# Patient Record
Sex: FEMALE | Race: WHITE | NOT HISPANIC OR LATINO | Employment: OTHER | URBAN - METROPOLITAN AREA
[De-identification: names, ages, dates, MRNs, and addresses within clinical notes are randomized per-mention and may not be internally consistent; named-entity substitution may affect disease eponyms.]

---

## 2018-01-29 LAB
INFLUENZA A ANTIGEN, POC: NEGATIVE
INFLUENZA B ANTIGEN, POC: NEGATIVE
RAPID GROUP A STREP (OHS): NEGATIVE

## 2020-12-02 ENCOUNTER — HISTORICAL (OUTPATIENT)
Dept: ADMINISTRATIVE | Facility: HOSPITAL | Age: 65
End: 2020-12-02

## 2020-12-02 LAB
ALBUMIN SERPL-MCNC: 4.2 GM/DL (ref 3.4–4.8)
ALBUMIN/GLOB SERPL: 1.2 RATIO (ref 1.1–2)
ALP SERPL-CCNC: 79 UNIT/L (ref 40–150)
ALT SERPL-CCNC: 16 UNIT/L (ref 0–55)
APPEARANCE, UA: CLEAR
AST SERPL-CCNC: 18 UNIT/L (ref 5–34)
BACTERIA SPEC CULT: ABNORMAL /HPF
BILIRUB SERPL-MCNC: 0.6 MG/DL
BILIRUB UR QL STRIP: NEGATIVE
BILIRUBIN DIRECT+TOT PNL SERPL-MCNC: 0.3 MG/DL (ref 0–0.5)
BILIRUBIN DIRECT+TOT PNL SERPL-MCNC: 0.3 MG/DL (ref 0–0.8)
BUN SERPL-MCNC: 12.5 MG/DL (ref 9.8–20.1)
CALCIUM SERPL-MCNC: 9.7 MG/DL (ref 8.4–10.2)
CHLORIDE SERPL-SCNC: 98 MMOL/L (ref 98–107)
CHOLEST SERPL-MCNC: 212 MG/DL
CHOLEST/HDLC SERPL: 4 {RATIO} (ref 0–5)
CO2 SERPL-SCNC: 28 MMOL/L (ref 23–31)
COLOR UR: YELLOW
CREAT SERPL-MCNC: 0.79 MG/DL (ref 0.55–1.02)
CREAT UR-MCNC: 111.9 MG/DL (ref 45–106)
EST. AVERAGE GLUCOSE BLD GHB EST-MCNC: >355.1 MG/DL
GLOBULIN SER-MCNC: 3.5 GM/DL (ref 2.4–3.5)
GLUCOSE (UA): NEGATIVE
GLUCOSE SERPL-MCNC: 104 MG/DL (ref 82–115)
HBA1C MFR BLD: >14 %
HDLC SERPL-MCNC: 56 MG/DL (ref 35–60)
HGB UR QL STRIP: NEGATIVE
KETONES UR QL STRIP: NEGATIVE
LDLC SERPL CALC-MCNC: 129 MG/DL (ref 50–140)
LEUKOCYTE ESTERASE UR QL STRIP: ABNORMAL
MICROALBUMIN UR-MCNC: 12 UG/ML
MICROALBUMIN/CREAT RATIO PNL UR: 10.7 MG/GM CR (ref 0–30)
NITRITE UR QL STRIP: NEGATIVE
PH UR STRIP: 7.5 [PH] (ref 5–9)
POTASSIUM SERPL-SCNC: 4.2 MMOL/L (ref 3.5–5.1)
PROT SERPL-MCNC: 7.7 GM/DL (ref 5.8–7.6)
PROT UR QL STRIP: NEGATIVE
RBC #/AREA URNS HPF: ABNORMAL /[HPF]
SODIUM SERPL-SCNC: 140 MMOL/L (ref 136–145)
SP GR UR STRIP: 1.02 (ref 1–1.03)
SQUAMOUS EPITHELIAL, UA: ABNORMAL
TRIGL SERPL-MCNC: 137 MG/DL (ref 37–140)
TSH SERPL-ACNC: 1.64 UIU/ML (ref 0.35–4.94)
UROBILINOGEN UR STRIP-ACNC: 0.2
VLDLC SERPL CALC-MCNC: 27 MG/DL
WBC #/AREA URNS HPF: ABNORMAL /[HPF]

## 2020-12-08 ENCOUNTER — HISTORICAL (OUTPATIENT)
Dept: ADMINISTRATIVE | Facility: HOSPITAL | Age: 65
End: 2020-12-08

## 2020-12-08 LAB
EST. AVERAGE GLUCOSE BLD GHB EST-MCNC: 137 MG/DL
HBA1C MFR BLD: 6.4 %

## 2021-01-04 ENCOUNTER — HISTORICAL (OUTPATIENT)
Dept: ADMINISTRATIVE | Facility: HOSPITAL | Age: 66
End: 2021-01-04

## 2021-01-14 ENCOUNTER — HISTORICAL (OUTPATIENT)
Dept: ADMINISTRATIVE | Facility: HOSPITAL | Age: 66
End: 2021-01-14

## 2021-03-03 ENCOUNTER — HISTORICAL (OUTPATIENT)
Dept: ADMINISTRATIVE | Facility: HOSPITAL | Age: 66
End: 2021-03-03

## 2021-04-29 LAB — CRC RECOMMENDATION EXT: NORMAL

## 2021-05-10 ENCOUNTER — HISTORICAL (OUTPATIENT)
Dept: ADMINISTRATIVE | Facility: HOSPITAL | Age: 66
End: 2021-05-10

## 2021-06-04 ENCOUNTER — HISTORICAL (OUTPATIENT)
Dept: ADMINISTRATIVE | Facility: HOSPITAL | Age: 66
End: 2021-06-04

## 2021-07-08 ENCOUNTER — HISTORICAL (OUTPATIENT)
Dept: ADMINISTRATIVE | Facility: HOSPITAL | Age: 66
End: 2021-07-08

## 2021-10-06 LAB
LEFT EYE DM RETINOPATHY: NEGATIVE
RIGHT EYE DM RETINOPATHY: NEGATIVE

## 2022-02-09 LAB
BCS RECOMMENDATION EXT: NORMAL
BMD RECOMMENDATION EXT: NORMAL

## 2022-05-02 NOTE — HISTORICAL [facility]ER
This is a historical note converted from [place]. Formatting and pictures may have been removed.  Please reference [place] for original formatting and attached multimedia. Chief Complaint  L total hip, global 2/2/21-5/3/21 pt states that her hip is perfectly fine. she is having trouble with her keft knee.  History of Present Illness  65-year-old female presents office today for 1 month follow-up on her left hip.? She is 1 month status post left total hip arthroplasty doing great. ?She is working with therapy and ambulating with a cane.  Review of Systems  Systemic: No fever, no chills, and no recent weight change.  Head: No headache - frequent.  Eyes: No vision problems.  Otolarnygeal: No hearing loss, no earache, no epistaxis, no hoarseness, and no tooth pain. Gums normal.  Cardiovascular: No chest pain or discomfort and no palpitations.  Pulmonary: No pulmonary symptoms - no dyspnea, no shortness of breath  Gastrointestinal: Appetite not decreased. No dysphagia and no constant eructation. No nausea, no vomiting, no abdominal pain, no hematochezia.  Genitourinary: No genitourinary symptoms - No urinary hesitancy. No urinary loss of control - no burning sensation during urination.  Musculoskeletal: No calf muscle cramps and no localized soft tissue swelling  Neurological: No fainting and no convulsions.  Psychological: no depression.  Skin: No rash.  Physical Exam  Vitals & Measurements  T:?97.7? ?F (Oral)? HR:?119(Peripheral)? BP:?135/79?  HT:?167.00?cm? WT:?107.100?kg? BMI:?38.4?  Musculoskeletal System:  left Hips:  General/bilateral: ? No swelling of the hips. ? No induration of the hips. ? No tenderness on palpation of the hips. ? No instability of the hips was noted.  left?Hip: ? Motion was normal.  Neurological:  Motor (Strength): ? Weakness of the?left hip was observed.  Skin:  ? No cellulitis.  Wound healing normal. Surgical incision C/D/I  Tests  Imaging:  X-Ray Of The Pelvis:  Pelvic x-ray was  performed.  X-Ray Hip:  Complete?left hip x-ray with two or more views of the AP and lateral aspects were performed.  Impressions Radiology Test  X-ray of hip was performed showed intact?left hip prosthesis.  Assessment/Plan  Status post total hip replacement, left?Z96.642  ?Full weightbearing left lower extremity continue use of a cane and physical therapy. ?Follow-up in 3 months for repeat evaluation.  Ordered:  Clinic Follow up, *Est. 06/03/21 3:00:00 CDT, Order for future visit, Status post total hip replacement, left, [facility]  Post-Op follow-up visit [id number] PC, Status post total hip replacement, left, [facility], 03/03/21 14:52:00 CST  PT/OT External Referral, 03/03/21 14:53:00 CST, Status post total hip replacement, left, Anit Grav Hip Abductor & Extensor Exc.  Aquatics for ROM & Strength  Isotonic hamstring & Closed Chain Quad  No Dry Needling  Therapeutic Excercise  Stretch and Strengthen, 3 X Week,...  XR Hip Left 2 Views, Routine, 03/03/21 14:41:00 CST, None, Stretcher, Patient Has IV?, Rad Type, Status post total hip replacement, left, Not Scheduled, 03/03/21 14:41:00 CST  ?  Referrals  Clinic Follow up, *Est. 06/03/21 3:00:00 CDT, Order for future visit, Status post total hip replacement, left, [facility]  PT/OT External Referral, 03/03/21 14:53:00 CST, Status post total hip replacement, left, Anit Grav Hip Abductor & Extensor Exc.  Aquatics for ROM & Strength  Isotonic hamstring & Closed Chain Quad  No Dry Needling  Therapeutic Excercise  Stretch and Strengthen, 3 X Week,...   Problem List/Past Medical History  Ongoing  Acetabular protrusion  Anxiety  Arthritis  BMI 36.0-36.9,adult  Diabetes mellitus  Hyperlipemia  Hypertension  Left hip pain  Need for influenza vaccination  Need for pneumococcal vaccine  Primary osteoarthritis of left hip  Primary osteoarthritis of left knee  Reflux  Well adult exam  Historical  Bruises easily  chronic sinus infections  left knee  pain  Uterine fibroids  Wears glasses  Procedure/Surgical History  [facility] Total Hip Arthroplasty (Left) (02/02/2021)  Replacement of Left Hip Joint with Synthetic Substitute, Uncemented, Open Approach (02/02/2021)  Arthroscopy, knee, surgical; with meniscectomy (medial AND lateral, including any meniscal shaving) including debridement/shaving of articular cartilage (chondroplasty), same or separate compartment(s), when performed. (11/15/2012)  Excision of semilunar cartilage of knee (11/15/2012)  Cholecystectomy  Hysterectomy  Tonsillectomy   Medications  ALPRAZolam 0.25 mg oral tablet, disintegrating, 0.25 mg= 1 tab(s), Oral, BID, PRN,? ?Not taking: PRN  aspirin 81 mg oral Delayed Release (EC) tablet, 81 mg= 1 tab(s), Oral, BID  escitalopram 20 mg oral tablet, See Instructions  Glucometer, See Instructions  hydrochlorothiazide-lisinopril 12.5 mg-20 mg oral tablet, 1 tab(s), Oral, Daily, 3 refills  melatonin 10 mg oral tablet, 10 mg= 1 tab(s), Oral, Once a day (at bedtime), PRN  metFORMIN 500 mg oral tablet, extended release, 1000 mg= 2 tab(s), Oral, Daily, 3 refills  MVI with Minerals (Adult Tab), 1 tab(s), Oral, Daily  potassium acid phosphate, 1 tab = 1000mg, Oral, Daily  Pravastatin 20 mg Oral Tab, 20 mg= 1 tab(s), Oral, Daily, 3 refills  Vitamin D3, 1 tab, Oral, Daily  Allergies  No Known Allergies  Social History  Abuse/Neglect  No, No, Yes, 03/03/2021  No, 02/02/2021  No, 02/02/2021  Alcohol  Current, Wine, Liquor, 1-2 times per month, 11/14/2012  Employment/School  Retired, Work/School description: Retired from [employer]. Highest education level: High school., 12/02/2020  Financial/Legal Situation  None, 01/20/2021  Home/Environment  Lives with Spouse. Living situation: Home/Independent. Glucose monitoring, 12/02/2020    Never in , 12/02/2020  Nutrition/Health  Diabetic, Good, 12/02/2020  Sexual  Sexually active: Yes., 12/02/2020  Spiritual/Cultural  Muslim, 12/02/2020  Substance  Use  Never, 12/02/2020  Tobacco - Denies Tobacco Use, 11/14/2012  Never (less than 100 in lifetime), N/A, Household tobacco concerns: No. Smokeless Tobacco Use: Never., 03/03/2021  Family History  Cancer: Mother and Father.  Diabetes mellitus type 1.: Father and Brother.  Stroke: Brother.  Immunizations  Vaccine Date Status   influenza virus vaccine, inactivated 01/20/2021 Given   pneumococcal 23-polyvalent vaccine 12/02/2020 Given   influenza virus vaccine, inactivated 10/06/2011 Recorded   Health Maintenance  Health Maintenance  ???Pending?(in the next year)  ??? ??Due?  ??? ? ? ?Bone Density Screening due??03/03/21??Variable frequency  ??? ? ? ?Breast Cancer Screening due??03/03/21??Unknown Frequency  ??? ? ? ?Colorectal Screening due??03/03/21??Unknown Frequency  ??? ? ? ?Diabetes Maintenance-Eye Exam due??03/03/21??Unknown Frequency  ??? ? ? ?Hypertension Maintenance-Medication Prescribed due??03/03/21??and every 1??year(s)  ??? ? ? ?Tetanus Vaccine due??03/03/21??and every 10??year(s)  ??? ? ? ?Zoster Vaccine due??03/03/21??Unknown Frequency  ??? ??Due In Future?  ??? ? ? ?Influenza Vaccine not due until??10/01/21??and every 1??day(s)  ??? ? ? ?Diabetes Maintenance-Foot Exam not due until??12/02/21??and every 1??year(s)  ??? ? ? ?Hypertension Management-Education not due until??12/02/21??and every 1??year(s)  ??? ? ? ?Diabetes Maintenance-Fasting Lipid Profile not due until??12/02/21??and every 1??year(s)  ??? ? ? ?ADL Screening not due until??12/02/21??and every 1??year(s)  ??? ? ? ?Obesity Screening not due until??01/01/22??and every 1??year(s)  ??? ? ? ?Advance Directive not due until??01/02/22??and every 1??year(s)  ??? ? ? ?Alcohol Misuse Screening not due until??01/02/22??and every 1??year(s)  ??? ? ? ?Cognitive Screening not due until??01/02/22??and every 1??year(s)  ??? ? ? ?Fall Risk Assessment not due until??01/02/22??and every 1??year(s)  ??? ? ? ?Functional Assessment not due until??01/02/22??and  every 1??year(s)  ??? ? ? ?Diabetes Maintenance-HgbA1c not due until??01/14/22??and every 1??year(s)  ??? ? ? ?Blood Pressure Screening not due until??01/20/22??and every 1??year(s)  ??? ? ? ?Body Mass Index Check not due until??01/20/22??and every 1??year(s)  ??? ? ? ?Depression Screening not due until??01/20/22??and every 1??year(s)  ??? ? ? ?Hypertension Management-Blood Pressure not due until??01/20/22??and every 1??year(s)  ??? ? ? ?Medicare Annual Wellness Exam not due until??01/20/22??and every 1??year(s)  ??? ? ? ?Hypertension Management-BMP not due until??02/04/22??and every 1??year(s)  ??? ? ? ?Diabetes Maintenance-Serum Creatinine not due until??02/04/22??and every 1??year(s)  ??? ? ? ?Aspirin Therapy for CVD Prevention not due until??02/04/22??and every 1??year(s)  ???Satisfied?(in the past 1 year)  ??? ??Satisfied?  ??? ? ? ?ADL Screening on??12/02/20.??Satisfied by Christelle Huggins LPN  ??? ? ? ?Advance Directive on??02/02/21.??Satisfied by Kourtney Simon RN.  ??? ? ? ?Alcohol Misuse Screening on??01/20/21.??Satisfied by Christelle Huggins LPN  ??? ? ? ?Aspirin Therapy for CVD Prevention on??02/04/21.??Satisfied by Anita Jean-Baptiste NP  ??? ? ? ?Blood Pressure Screening on??03/03/21.??Satisfied by Sailaja Antoine  ??? ? ? ?Body Mass Index Check on??03/03/21.??Satisfied by Sailaja Antoine  ??? ? ? ?Breast Cancer Screening on??01/27/21.??Satisfied by Nish Calhoun MD  ??? ? ? ?Cognitive Screening on??01/20/21.??Satisfied by Chirstelle Huggins LPN  ??? ? ? ?Depression Screening on??03/03/21.??Satisfied by Sailaja Antoine  ??? ? ? ?Diabetes Maintenance-Serum Creatinine on??02/04/21.??Satisfied by Myrtle Romero  ??? ? ? ?Diabetes Maintenance-HgbA1c on??01/14/21.??Satisfied by Andressa Turcios  ??? ? ? ?Diabetes Maintenance-Foot Exam on??12/02/20.??Satisfied by Nish Calhoun MD  ??? ? ? ?Diabetes Maintenance-Fasting Lipid Profile on??12/02/20.??Satisfied by Clementina Zapien  ??? ? ? ?Diabetes  Maintenance-Microalbumin on??12/02/20.??Satisfied by John Luna  ??? ? ? ?Diabetes Screening on??02/04/21.??Satisfied by Myrtle Romero  ??? ? ? ?Fall Risk Assessment on??03/03/21.??Satisfied by Sailaja Antoine  ??? ? ? ?Functional Assessment on??03/03/21.??Satisfied by Sailaja Antoine  ??? ? ? ?Hypertension Management-Blood Pressure on??03/03/21.??Satisfied by Sailaja Antoine  ??? ? ? ?Hypertension Management-BMP on??02/04/21.??Satisfied by Myrtle Romero  ??? ? ? ?Hypertension Management-Education on??12/02/20.??Satisfied by Christelle Huggins LPN  ??? ? ? ?Influenza Vaccine on??02/02/21.??Satisfied by Alfred KELLOGG, Kourtney JACQUES  ??? ? ? ?Lipid Screening on??12/02/20.??Satisfied by Clementina Zapien  ??? ? ? ?Medicare Annual Wellness Exam on??01/20/21.??Satisfied by Nish Calhoun MD  ??? ? ? ?Obesity Screening on??03/03/21.??Satisfied by Sailaja Antoine  ??? ? ? ?Pneumococcal Vaccine on??12/02/20.??Satisfied by Shanice Lopez  ?

## 2022-05-02 NOTE — HISTORICAL [facility]
This is a historical note converted from [place]. Formatting and pictures may have been removed.  Please reference [facility] for original formatting and attached multimedia. Chief Complaint  pt here for 2W POSTOP L TKA 6/24/21- GL 9/22/2021. she states the narcotic pain medication has been bothering her stomach, she cannot get much pain relief and has been in constant pain.  History of Present Illness  Patient here today for follow-up from left TKA  Having less pain  No wound drainage  Tolerating PT well  Still with some swelling and stiffness  Patient has not been using the prescription pain medicines due to upsetting her stomach.  Has been using over-the-counter Tylenol and she states that she is having quite a?bit of pain especially she goes to therapy  Review of Systems  Constitutional: No fever, No chills.  Respiratory: No shortness of breath, No cough.  Cardiovascular: No chest pain.  Gastrointestinal: No nausea, No vomiting, No diarrhea, No constipation, No heartburn.  Genitourinary: No dysuria, No hematuria.  Hematology/Lymphatics: No bleeding tendency.  Endocrine: No polyuria.  Neurologic: Alert and oriented X4, No numbness, No tingling.  Psychiatric: No anxiety, No depression.  Integumentary: ?negative except as documented in history of present illness  Physical Exam  Vitals & Measurements  T:?37? ?C (Oral)? HR:?93(Peripheral)? BP:?104/74?  HT:?167.00?cm? WT:?104.400?kg? BMI:?37.43?  Left knee exam  Wound healing well without s/s infection  ROM?2 to 90  Walking with altered gait  Mild swelling to the knee  CMS intact to the left lower extremity  ? ?  x-rays show intact prosthesis in good position  ? ?  staples removed in office today  Steri strips applied  wound care instructions discussed with patient  ? ?  Plan:  Continue with therapy and home exercises  Follow-up in 3 months with Dr. Olvera  ?  Assessment/Plan  1.?S/P total knee replacement?Z96.659  Ordered:  acetaminophen-HYDROcodone, 1 tab(s), Oral, q6hr,  PRN PRN as needed for pain, 0.5 tab q6 hours prn pain, # 10 tab(s), 0 Refill(s), Pharmacy: [facility] #[id number], 167, cm, Height/Length Dosing, 07/08/21 13:52:00 CDT, 104.4, kg, Weight Dosing, 07/08/21 13:52:00 CDT  XR Knee Left 3 Views, Routine, 07/08/21 13:42:00 CDT, None, Stretcher, Patient Has IV?, Rad Type, S/P total knee replacement, Not Scheduled, 07/08/21 13:42:00 CDT  ?   Problem List/Past Medical History  Ongoing  Acetabular protrusion  Anxiety  Arthritis  BMI 36.0-36.9,adult  Diabetes mellitus  History of total left hip arthroplasty  Hyperlipemia  Hypertension  Impaired gait and mobility  Left hip pain  Need for influenza vaccination  Need for pneumococcal vaccine  Obesity  Pre-op exam  Primary osteoarthritis of left hip  Primary osteoarthritis of left knee  Reflux  Well adult exam  Historical  Bruises easily  chronic sinus infections  left knee pain  Uterine fibroids  Wears glasses  Procedure/Surgical History  [facility] Total Knee Arthroplasty (Left) (06/24/2021)  Replacement of Left Knee Joint with Synthetic Substitute, Uncemented, Open Approach (06/24/2021)  Robotic Assisted Procedure of Lower Extremity, Open Approach (06/24/2021)  Colonoscopy (04/29/2021)  [facility] Total Hip Arthroplasty (Left) (02/02/2021)  Replacement of Left Hip Joint with Synthetic Substitute, Uncemented, Open Approach (02/02/2021)  Arthroscopy, knee, surgical; with meniscectomy (medial AND lateral, including any meniscal shaving) including debridement/shaving of articular cartilage (chondroplasty), same or separate compartment(s), when performed. (11/15/2012)  Excision of semilunar cartilage of knee (11/15/2012)  Cholecystectomy  Hysterectomy  Tonsillectomy   Medications  ALPRAZolam 0.25 mg oral tablet, disintegrating, 0.25 mg= 1 tab(s), Oral, BID, PRN  aspirin 81 mg oral Delayed Release (EC) tablet, 81 mg= 1 tab(s), Oral, BID  escitalopram 20 mg oral tablet, See Instructions  hydrochlorothiazide-lisinopril 12.5 mg-20 mg oral  tablet, 1 tab(s), Oral, Daily  melatonin 10 mg oral tablet, 10 mg= 1 tab(s), Oral, Once a day (at bedtime), PRN  MetFORMIN (Eqv-Glucophage XR) 500 mg oral tablet, extended release, See Instructions  MVI with Minerals (Adult Tab), 1 tab(s), Oral, Daily  Norco 7.5 mg-325 mg oral tablet, 1 tab(s), Oral, q6hr, PRN  polyethylene glycol 3350 oral powder for reconstitution, Oral, Daily  Pravastatin 20 mg Oral Tab, 20 mg= 1 tab(s), Oral, Daily, 3 refills  Robaxin 750 mg oral tablet, 750 mg= 1 tab(s), Oral, q8hr, PRN  Vitamin D3, 1 tab, Oral, Daily  Allergies  No Known Allergies  Social History  Abuse/Neglect  No, No, Yes, 07/08/2021  No, 06/04/2021  No, 02/02/2021  Alcohol  Current, Wine, Liquor, 1-2 times per month, 11/14/2012  Employment/School  Retired, Work/School description: Retired from [employer]. Highest education level: High school., 12/02/2020  Financial/Legal Situation  None, 01/20/2021  Home/Environment  Lives with Spouse. Living situation: Home/Independent. Glucose monitoring, 12/02/2020    Never in , 12/02/2020  Nutrition/Health  Diabetic, Good, 12/02/2020  Sexual  Sexually active: Yes., 12/02/2020  Spiritual/Cultural  Faith, 12/02/2020  Substance Use  Never, 12/02/2020  Tobacco - Denies Tobacco Use, 11/14/2012  Never (less than 100 in lifetime), N/A, Household tobacco concerns: No. Smokeless Tobacco Use: Never., 07/08/2021  Family History  Cancer: Mother and Father.  Diabetes mellitus type 1.: Father and Brother.  Stroke: Brother.  Immunizations  Vaccine Date Status   COVID-19 MRNA, LNP-S, PF- Pfizer 04/13/2021 Recorded   COVID-19 MRNA, LNP-S, PF- Pfizer 03/16/2021 Recorded   influenza virus vaccine, inactivated 01/20/2021 Given   pneumococcal 23-polyvalent vaccine 12/02/2020 Given   influenza virus vaccine, inactivated 10/06/2011 Recorded   Health Maintenance  Health Maintenance  ???Pending?(in the next year)  ??? ??Due?  ??? ? ? ?Bone Density Screening due??07/08/21??Variable frequency  ???  ? ? ?Diabetes Maintenance-Eye Exam due??07/08/21??Unknown Frequency  ??? ? ? ?Hypertension Maintenance-Medication Prescribed due??07/08/21??and every 1??year(s)  ??? ? ? ?Tetanus Vaccine due??07/08/21??and every 10??year(s)  ??? ? ? ?Zoster Vaccine due??07/08/21??Unknown Frequency  ??? ??Due In Future?  ??? ? ? ?Influenza Vaccine not due until??10/01/21??and every 1??day(s)  ??? ? ? ?Diabetes Maintenance-Foot Exam not due until??12/02/21??and every 1??year(s)  ??? ? ? ?Hypertension Management-Education not due until??12/02/21??and every 1??year(s)  ??? ? ? ?Diabetes Maintenance-Fasting Lipid Profile not due until??12/02/21??and every 1??year(s)  ??? ? ? ?ADL Screening not due until??12/02/21??and every 1??year(s)  ??? ? ? ?Obesity Screening not due until??01/01/22??and every 1??year(s)  ??? ? ? ?Advance Directive not due until??01/02/22??and every 1??year(s)  ??? ? ? ?Alcohol Misuse Screening not due until??01/02/22??and every 1??year(s)  ??? ? ? ?Cognitive Screening not due until??01/02/22??and every 1??year(s)  ??? ? ? ?Fall Risk Assessment not due until??01/02/22??and every 1??year(s)  ??? ? ? ?Functional Assessment not due until??01/02/22??and every 1??year(s)  ??? ? ? ?Diabetes Maintenance-HgbA1c not due until??01/14/22??and every 1??year(s)  ??? ? ? ?Medicare Annual Wellness Exam not due until??01/20/22??and every 1??year(s)  ??? ? ? ?Hypertension Management-BMP not due until??06/25/22??and every 1??year(s)  ??? ? ? ?Diabetes Maintenance-Serum Creatinine not due until??06/25/22??and every 1??year(s)  ??? ? ? ?Aspirin Therapy for CVD Prevention not due until??06/25/22??and every 1??year(s)  ???Satisfied?(in the past 1 year)  ??? ??Satisfied?  ??? ? ? ?ADL Screening on??12/02/20.??Satisfied by Christelle Huggins LPN  ??? ? ? ?Advance Directive on??06/24/21.??Satisfied by Lo KELLOGG, Magaly CHARLES.  ??? ? ? ?Alcohol Misuse Screening on??01/20/21.??Satisfied by Christelle Huggins LPN  ??? ? ? ?Aspirin Therapy for CVD  Prevention on??06/25/21.??Satisfied by Anita Jean-Baptiste NP  ??? ? ? ?Blood Pressure Screening on??07/08/21.??Satisfied by Lily Vicente  ??? ? ? ?Body Mass Index Check on??07/08/21.??Satisfied by Lily Vicente  ??? ? ? ?Breast Cancer Screening on??01/27/21.??Satisfied by Nish Calhoun MD  ??? ? ? ?Cognitive Screening on??01/20/21.??Satisfied by Christelle Huggins LPN  ??? ? ? ?Colorectal Screening on??04/29/21.??Satisfied by Nish Calhoun MD  ??? ? ? ?Depression Screening on??07/08/21.??Satisfied by Lily Vicente  ??? ? ? ?Diabetes Maintenance-Serum Creatinine on??06/25/21.??Satisfied by Faustino Traore  ??? ? ? ?Diabetes Maintenance-HgbA1c on??06/04/21.??Satisfied by Benson Gar  ??? ? ? ?Diabetes Maintenance-Foot Exam on??12/02/20.??Satisfied by Nish Calhoun MD  ??? ? ? ?Diabetes Maintenance-Fasting Lipid Profile on??12/02/20.??Satisfied by Clementina Zapien  ??? ? ? ?Diabetes Maintenance-Microalbumin on??12/02/20.??Satisfied by John Luna  ??? ? ? ?Diabetes Screening on??06/25/21.??Satisfied by Faustino Traore G.  ??? ? ? ?Fall Risk Assessment on??07/08/21.??Satisfied by Lily Vicente  ??? ? ? ?Functional Assessment on??06/25/21.??Satisfied by Magaly Gonzalez RN  ??? ? ? ?Hypertension Management-Blood Pressure on??07/08/21.??Satisfied by Lily Vicente  ??? ? ? ?Hypertension Management-BMP on??06/25/21.??Satisfied by Faustino Traore  ??? ? ? ?Hypertension Management-Education on??12/02/20.??Satisfied by Christelle Huggins LPN  ??? ? ? ?Influenza Vaccine on??02/02/21.??Satisfied by Alfred KELLOGG, Kourtney JACQUES  ??? ? ? ?Lipid Screening on??12/02/20.??Satisfied by Clementina Zapien  ??? ? ? ?Medicare Annual Wellness Exam on??01/20/21.??Satisfied by Nish Calhoun MD  ??? ? ? ?Obesity Screening on??07/08/21.??Satisfied by Lily Vicente  ??? ? ? ?Pneumococcal Vaccine on??12/02/20.??Satisfied by Shanice Lopez  ?

## 2022-05-02 NOTE — HISTORICAL [facility]
This is a historical note converted from [place]. Formatting and pictures may have been removed.  Please reference [place] for original formatting and attached multimedia. Chief Complaint  LEFT KNEE PAIN. ? NO INJURY OR PRIOR SURGERY. ?SHE BOUGHT A KNEE SLEEVE AND THINKS IT HURTS MORE THAN HELPS. ?SHE HAD A KNEE INJECTION ABOUT A YEAR AGO THAT DID NOT HELP.  History of Present Illness  65-year-old female presents office today for evaluation of her left knee pain. ?This is lateral sided and been present for many years. ?No associated injury.? She does have a history of?knee arthroscopy in 2012 for?torn meniscus.? She has trouble with weightbearing and activity. ?She has tried prior treatments consisting of?cortisone injections with no relief.? She denies any locking, catching, giving way episodes.? Denies groin pain, numbness or tingling down the leg.  Review of Systems  Systemic: No fever, no chills, and no recent weight change.  Head: No headache - frequent.  Eyes: No vision problems.  Otolarnygeal: No hearing loss, no earache, no epistaxis, no hoarseness, and no tooth pain. Gums normal.  Cardiovascular: No chest pain or discomfort and no palpitations.  Pulmonary: No pulmonary symptoms - no dyspnea, no shortness of breath  Gastrointestinal: Appetite not decreased. No dysphagia and no constant eructation. No nausea, no vomiting, no abdominal pain, no hematochezia.  Genitourinary: No genitourinary symptoms - No urinary hesitancy. No urinary loss of control - no burning sensation during urination.  Musculoskeletal: No calf muscle cramps and no localized soft tissue swelling  Neurological: No fainting and no convulsions.  Psychological: no depression.  Skin: No rash.  Physical Exam  Vitals & Measurements  T:?99.3? ?F (Oral)? BP:?138/82? WT:?106.500?kg? BMI:?36.85?  General exam:  Well-groomed,?well-nourished, no acute distress  Alert and oriented ?3  HEENT: PERRLA,?normocephalic, atraumatic  Cardiovascular: S1 and S2  heard,?no murmurs  Pulmonary: Lungs clear to auscultation?bilaterally  Gastrointestinal: Positive bowel sounds,?soft, nontender  ?   PHYSICAL FINDINGS  Cardiovascular:  Arterial Pulses: Posterior tibialis pulses were normal. Dorsalis pedis pulses were normal left.  Musculoskeletal System:  Thigh:  ?Thigh showed quadriceps atrophy.  Left?knee:  Grade 1 effusion  ?Patella demonstrated crepitus. Anterolateral aspect was tender on palpation. Lateral aspect was tender on palpation. Iliotibial Band was tender on palpation. Active motion.  Knee Motion: Value  Active flexion?120 degrees  Active extension?5 degrees  Pain was elicited by flexion. No erythema. No warmth. No medial instability. No lateral instability. No one plane medial (straight) instability. No one plane lateral (straight) instability. A Lachman test did not demonstrate one plane anterior instability.  Neurological:  Gait And Stance: A left-sided antalgic gait was observed.  ???  ???  TESTS  Imaging:  X-Ray Knee:  A complete knee x-ray with standing views was performed -of?left knee.  AP and lateral view x-rays of the?left knee with sunrise view of the patella were performed.  ???  ???  IMPRESSIONS RADIOLOGY TEST  Narrowing of the joint space bone on bone?in the lateral and patellofemoral compartments, and osteophytes arising from the?left knee.  Kellgren Jose?grade 4 changes  ?  ?   Left hip exam  No swelling?or tenderness over the trochanteric bursa  Mild discomfort with internal rotation but no discomfort with external rotation  Passive hip abduction?20 degrees  Passive hip flexion 90 degrees  Passive internal rotation 5 degrees  Passive external rotation 25degrees  Neurological:  Motor (Motor Strength): No weakness of the left hip was observed.  Gait And Stance: Abnormal. An antalgic gait was observed.  ?   Tests  Imaging:  Left hip x-ray with two or more views of the AP and lateral aspects were performed.  Impressions  Advanced?joint space narrowing  with osteophytes arising from the hip joint and subchondral cysts seen. acetabular protrusio seen  Kellgren Jose?grade 4 changes  Assessment/Plan  1.?Primary osteoarthritis of left knee?M17.12  ?Discussed robotic assisted left total knee arthroplasty. ?Recommend doing?total hip arthroplasty first. ?We will get her hip addressed first and then?discuss future knee replacement surgery.  Ordered:  CBC w/ Auto Diff, Routine collect, 01/04/21 13:47:00 CST, Blood, Order for future visit, Stop date 01/04/21 13:47:00 CST, Lab Collect, ORTHO-If Hg<11, refer to hematology, Primary osteoarthritis of left knee  Primary osteoarthritis of left hip  Acetabular protrusion...  Clinic Follow-up PRN, 01/04/21 13:38:00 CST, Future Order, [facility]  Comprehensive Metabolic Panel, Routine collect, 01/04/21 13:47:00 CST, Blood, Order for future visit, Stop date 01/04/21 13:47:00 CST, Lab Collect, ORTHO-If albumin <3.5, refer to internal medicine, Primary osteoarthritis of left knee  Primary osteoarthritis of left hip  Acetabul...  Electrocardiogram Adult 12 Lead, *Est. 01/11/21 3:00:00 CST, Other, *Est. 01/11/21 3:00:00 CST, Stretcher, Patient Has IV, Orders for Future Visit, -1, -1, 01/04/21 13:47:00 CST, Primary osteoarthritis of left knee  Primary osteoarthritis of left hip  Acetabular protrusion  Diabet...  MRSA Pcr, Routine collect, Nasopharyngeal Swab, Order for future visit, 01/04/21 13:48:00 CST, Stop date 01/04/21 13:48:00 CST, Nurse collect, Primary osteoarthritis of left knee  Primary osteoarthritis of left hip  Acetabular protrusion  Diabetes mellitus ...  Urinalysis with Microscopic if Indicated, Routine collect, Urine, Order for future visit, 01/04/21 13:48:00 CST, Stop date 01/04/21 13:48:00 CST, Nurse collect, Primary osteoarthritis of left knee  Primary osteoarthritis of left hip  Acetabular protrusion  Diabetes mellitus  Hypertension  XR Chest 2 Views, Routine, 01/04/21 13:48:00 CST, Other (please  specify), None, Stretcher, Patient Has IV?, Rad Type, Order for future visit, Primary osteoarthritis of left knee  Primary osteoarthritis of left hip  Acetabular protrusion  Diabetes mellitus  Hyperten...  ?  2.?Primary osteoarthritis of left hip?M16.12  ?Robotic assisted left total hip arthroplasty  We will use Ecotrin aspirin postoperatively for DVT prophylaxis  The risks, benefits, and alternatives to surgery were discussed with the patient and family and they wish to proceed with the operation.  Dr. Fontaine has examined the patient, x-rays and agrees with the plan  Ordered:  CBC w/ Auto Diff, Routine collect, 01/04/21 13:47:00 CST, Blood, Order for future visit, Stop date 01/04/21 13:47:00 CST, Lab Collect, ORTHO-If Hg<11, refer to hematology, Primary osteoarthritis of left knee  Primary osteoarthritis of left hip  Acetabular protrusion...  Clinic Follow-up PRN, 01/04/21 13:38:00 CST, Future Order, [facility]  Comprehensive Metabolic Panel, Routine collect, 01/04/21 13:47:00 CST, Blood, Order for future visit, Stop date 01/04/21 13:47:00 CST, Lab Collect, ORTHO-If albumin <3.5, refer to internal medicine, Primary osteoarthritis of left knee  Primary osteoarthritis of left hip  Acetabul...  CT Ext Lower Robotic Left, Routine, *Est. 01/04/21 3:00:00 CST, Other (please specify), None, Stretcher, Patient Has IV?, left ESTRELLA, Rad Type, Order for future visit, Primary osteoarthritis of left hip, Schedule this test, [facility], *Est. 01/04/21 3...  Electrocardiogram Adult 12 Lead, *Est. 01/11/21 3:00:00 CST, Other, *Est. 01/11/21 3:00:00 CST, Stretcher, Patient Has IV, Orders for Future Visit, -1, -1, 01/04/21 13:47:00 CST, Primary osteoarthritis of left knee  Primary osteoarthritis of left hip  Acetabular protrusion  Diabet...  MRSA Pcr, Routine collect, Nasopharyngeal Swab, Order for future visit, 01/04/21 13:48:00 CST, Stop date 01/04/21 13:48:00 CST, Nurse collect, Primary  osteoarthritis of left knee  Primary osteoarthritis of left hip  Acetabular protrusion  Diabetes mellitus ...  Urinalysis with Microscopic if Indicated, Routine collect, Urine, Order for future visit, 01/04/21 13:48:00 CST, Stop date 01/04/21 13:48:00 CST, Nurse collect, Primary osteoarthritis of left knee  Primary osteoarthritis of left hip  Acetabular protrusion  Diabetes mellitus  Hypertension  XR Chest 2 Views, Routine, 01/04/21 13:48:00 CST, Other (please specify), None, Stretcher, Patient Has IV?, Rad Type, Order for future visit, Primary osteoarthritis of left knee  Primary osteoarthritis of left hip  Acetabular protrusion  Diabetes mellitus  Hyperten...  ?  3.?Acetabular protrusion?M24.7  Ordered:  CBC w/ Auto Diff, Routine collect, 01/04/21 13:47:00 CST, Blood, Order for future visit, Stop date 01/04/21 13:47:00 CST, Lab Collect, ORTHO-If Hg<11, refer to hematology, Primary osteoarthritis of left knee  Primary osteoarthritis of left hip  Acetabular protrusion...  Clinic Follow-up PRN, 01/04/21 13:38:00 CST, Future Order, [facility]  Comprehensive Metabolic Panel, Routine collect, 01/04/21 13:47:00 CST, Blood, Order for future visit, Stop date 01/04/21 13:47:00 CST, Lab Collect, ORTHO-If albumin <3.5, refer to internal medicine, Primary osteoarthritis of left knee  Primary osteoarthritis of left hip  Acetabul...  Electrocardiogram Adult 12 Lead, *Est. 01/11/21 3:00:00 CST, Other, *Est. 01/11/21 3:00:00 CST, Stretcher, Patient Has IV, Orders for Future Visit, -1, -1, 01/04/21 13:47:00 CST, Primary osteoarthritis of left knee  Primary osteoarthritis of left hip  Acetabular protrusion  Diabet...  MRSA Pcr, Routine collect, Nasopharyngeal Swab, Order for future visit, 01/04/21 13:48:00 CST, Stop date 01/04/21 13:48:00 CST, Nurse collect, Primary osteoarthritis of left knee  Primary osteoarthritis of left hip  Acetabular protrusion  Diabetes mellitus ...  Urinalysis with Microscopic if  Indicated, Routine collect, Urine, Order for future visit, 01/04/21 13:48:00 CST, Stop date 01/04/21 13:48:00 CST, Nurse collect, Primary osteoarthritis of left knee  Primary osteoarthritis of left hip  Acetabular protrusion  Diabetes mellitus  Hypertension  XR Chest 2 Views, Routine, 01/04/21 13:48:00 CST, Other (please specify), None, Stretcher, Patient Has IV?, Rad Type, Order for future visit, Primary osteoarthritis of left knee  Primary osteoarthritis of left hip  Acetabular protrusion  Diabetes mellitus  Hyperten...  ?  4.?Diabetes mellitus?E11.9  Ordered:  CBC w/ Auto Diff, Routine collect, 01/04/21 13:47:00 CST, Blood, Order for future visit, Stop date 01/04/21 13:47:00 CST, Lab Collect, ORTHO-If Hg<11, refer to hematology, Primary osteoarthritis of left knee  Primary osteoarthritis of left hip  Acetabular protrusion...  Comprehensive Metabolic Panel, Routine collect, 01/04/21 13:47:00 CST, Blood, Order for future visit, Stop date 01/04/21 13:47:00 CST, Lab Collect, ORTHO-If albumin <3.5, refer to internal medicine, Primary osteoarthritis of left knee  Primary osteoarthritis of left hip  Acetabul...  Electrocardiogram Adult 12 Lead, *Est. 01/11/21 3:00:00 CST, Other, *Est. 01/11/21 3:00:00 CST, Stretcher, Patient Has IV, Orders for Future Visit, -1, -1, 01/04/21 13:47:00 CST, Primary osteoarthritis of left knee  Primary osteoarthritis of left hip  Acetabular protrusion  Diabet...  MRSA Pcr, Routine collect, Nasopharyngeal Swab, Order for future visit, 01/04/21 13:48:00 CST, Stop date 01/04/21 13:48:00 CST, Nurse collect, Primary osteoarthritis of left knee  Primary osteoarthritis of left hip  Acetabular protrusion  Diabetes mellitus ...  Urinalysis with Microscopic if Indicated, Routine collect, Urine, Order for future visit, 01/04/21 13:48:00 CST, Stop date 01/04/21 13:48:00 CST, Nurse collect, Primary osteoarthritis of left knee  Primary osteoarthritis of left hip  Acetabular  protrusion  Diabetes mellitus  Hypertension  XR Chest 2 Views, Routine, 01/04/21 13:48:00 CST, Other (please specify), None, Stretcher, Patient Has IV?, Rad Type, Order for future visit, Primary osteoarthritis of left knee  Primary osteoarthritis of left hip  Acetabular protrusion  Diabetes mellitus  Hyperten...  ?  5.?Hypertension?I10  Ordered:  CBC w/ Auto Diff, Routine collect, 01/04/21 13:47:00 CST, Blood, Order for future visit, Stop date 01/04/21 13:47:00 CST, Lab Collect, ORTHO-If Hg<11, refer to hematology, Primary osteoarthritis of left knee  Primary osteoarthritis of left hip  Acetabular protrusion...  Comprehensive Metabolic Panel, Routine collect, 01/04/21 13:47:00 CST, Blood, Order for future visit, Stop date 01/04/21 13:47:00 CST, Lab Collect, ORTHO-If albumin <3.5, refer to internal medicine, Primary osteoarthritis of left knee  Primary osteoarthritis of left hip  Acetabul...  Electrocardiogram Adult 12 Lead, *Est. 01/11/21 3:00:00 CST, Other, *Est. 01/11/21 3:00:00 CST, Stretcher, Patient Has IV, Orders for Future Visit, -1, -1, 01/04/21 13:47:00 CST, Primary osteoarthritis of left knee  Primary osteoarthritis of left hip  Acetabular protrusion  Diabet...  MRSA Pcr, Routine collect, Nasopharyngeal Swab, Order for future visit, 01/04/21 13:48:00 CST, Stop date 01/04/21 13:48:00 CST, Nurse collect, Primary osteoarthritis of left knee  Primary osteoarthritis of left hip  Acetabular protrusion  Diabetes mellitus ...  Urinalysis with Microscopic if Indicated, Routine collect, Urine, Order for future visit, 01/04/21 13:48:00 CST, Stop date 01/04/21 13:48:00 CST, Nurse collect, Primary osteoarthritis of left knee  Primary osteoarthritis of left hip  Acetabular protrusion  Diabetes mellitus  Hypertension  XR Chest 2 Views, Routine, 01/04/21 13:48:00 CST, Other (please specify), None, Stretcher, Patient Has IV?, Rad Type, Order for future visit, Primary osteoarthritis of left knee   Primary osteoarthritis of left hip  Acetabular protrusion  Diabetes mellitus  Hyperten...  ?  Orders:  XR Hip Left 2 Views, Routine, 01/04/21 13:13:00 CST, None, Stretcher, Patient Has IV?, Rad Type, Left hip pain, Not Scheduled, 01/04/21 13:13:00 CST   Problem List/Past Medical History  Ongoing  Acetabular protrusion  Anxiety  Arthritis  BMI 36.0-36.9,adult  Diabetes mellitus  Hyperlipemia  Hypertension  Left hip pain  Need for pneumococcal vaccine  Primary osteoarthritis of left hip  Primary osteoarthritis of left knee  Reflux  Historical  Bruises easily  chronic sinus infections  left knee pain  Uterine fibroids  Wears glasses  Procedure/Surgical History  Arthroscopy, knee, surgical; with meniscectomy (medial AND lateral, including any meniscal shaving) including debridement/shaving of articular cartilage (chondroplasty), same or separate compartment(s), when performed. (11/15/2012)  Excision of semilunar cartilage of knee (11/15/2012)  Cholecystectomy  Cholecystectomy  Hysterectomy  Hysterectomy  Tonsillectomy  Tonsillectomy   Medications  Inpatient  No active inpatient medications  Home  ALPRAZolam 0.25 mg oral tablet, disintegrating, 0.25 mg= 1 tab(s), Oral, BID, PRN  gabapentin 300 mg oral capsule, 300 mg= 1 cap(s), Oral, TID,? ?Not Taking, Completed Rx  Glucometer, See Instructions  hydrochlorothiazide-lisinopril 12.5 mg-20 mg oral tablet, Oral, Daily  melatonin 10 mg oral tablet, 10 mg= 1 tab(s), Oral, Once a day (at bedtime), PRN  metformin 500 mg oral tablet, Oral, BID  MVI with Minerals (Adult Tab), 1 tab(s), Oral, Daily  Pravastatin 20 mg Oral Tab, 20 mg= 1 tab(s), Oral, Daily  sertraline 100 mg oral tablet, 150 mg= 1.5 tab(s), Oral, Daily, 1 refills  Allergies  No Known Allergies  Social History  Abuse/Neglect  No, 01/04/2021  Alcohol  Never, 08/04/2016  Current, Wine, Liquor, 1-2 times per month, 11/14/2012  Employment/School  Retired, Work/School description: Retired from [employer]. [employer]  education level: High school., 12/02/2020  Home/Environment  Lives with Spouse. Living situation: Home/Independent. Glucose monitoring, 12/02/2020    Never in , 12/02/2020  Nutrition/Health  Diabetic, Good, 12/02/2020  Sexual  Sexually active: Yes., 12/02/2020  Spiritual/Cultural  Church, 12/02/2020  Substance Use  Never, 12/02/2020  Tobacco - Denies Tobacco Use, 11/14/2012  Never (less than 100 in lifetime), N/A, Household tobacco concerns: No. Smokeless Tobacco Use: Never., 01/04/2021  Family History  Cancer: Mother and Father.  Diabetes mellitus type 1.: Father and Brother.  Stroke: Brother.  Immunizations  Vaccine Date Status   pneumococcal 23-polyvalent vaccine 12/02/2020 Given   influenza virus vaccine, inactivated 10/06/2011 Recorded

## 2022-05-02 NOTE — HISTORICAL [facility]
This is a historical note converted from [employer]. Formatting and pictures may have been removed.  Please reference Duane for original formatting and attached multimedia. Chief Complaint  3 MONTH F/U LEFT ESTRELLA, HERE TO DISCUSS LEFT TKA.  History of Present Illness  _Left KNEE  ?  Knee joint pain, Worse with weightbearing  Slowly worsens with extended activity  Pain is increased by bending it and by twisting?  complains of knee joint swelling and stiffness?  Catching and grating during movement  4 months postop from left total hip arthroplasty. ?No pain in the left hip.? No groin pain.  Review of Systems  Systemic: No fever, no chills, and no recent weight change.  Head: No headache - frequent.  Eyes: No vision problems.  Otolarnygeal: No hearing loss, no earache, no epistaxis, no hoarseness, and no tooth pain. Gums normal.  Cardiovascular: No chest pain or discomfort and no palpitations.  Pulmonary: No pulmonary symptoms - difficulty sleeping, no dyspnea, and cough not worse in the morning.  Gastrointestinal: Appetite not decreased. No dysphagia and no constant eructation. No nausea, no vomiting, no abdominal pain, no hematochezia, and no loose/mushy stools - frequent. No constipation - frequent.  Genitourinary: No genitourinary symptoms - Getting up every night to urinate and no increase in urinary frequency. No urinary hesitancy. No urinary loss of control - difficulty stopping urination and no burning sensation during urination.  Musculoskeletal: No calf muscle cramps and no localized soft tissue swelling of the ankle.  Neurological: No fainting and no convulsions.  Psychological: Not feeling nervous tension, not feeling nervous from exhaustion, and no depression.  Skin: No rash. Previous history of no ulcers.  ?  Physical Exam  Vitals & Measurements  T:?36.9? ?C (Oral)? HR:?96(Peripheral)? RR:?20? BP:?133/75?  HT:?167.00?cm? WT:?107.100?kg? BMI:?38.4?  Left hip exam:  Musculoskeletal System:  Left  Hip:  General/bilateral: ? No swelling of the hips. ? No induration of the hips. ? No tenderness on palpation of the hips. ? No instability of the hips was noted.  Left Hip: ? Motion was normal.  Neurological:  Motor (Strength): ? No weakness of the left hip was observed.  Skin:  ? No cellulitis. Surgical incision well healed  ?  Left knee exam:  PHYSICAL FINDINGS  Cardiovascular:  Arterial Pulses: Posterior tibialis pulses were normal left. Dorsalis pedis pulses were normal left.  Musculoskeletal System:  Thigh:  Left Thigh: Thigh showed quadriceps atrophy.  Knee:  Left Knee: Examined.  Knee  Grade 1 effusion  Genu valgum. Patella demonstrated crepitus. Anterolateral aspect was tender on palpation. Lateral aspect was tender on palpation. Iliotibial Band was tender on palpation. Active motion.  Left Knee:  Left Knee Motion: Value?  Active flexion 120_ degrees  Active extension 0_ degrees  Pain was elicited by flexion. No erythema. No warmth. No medial instability. No lateral instability. No one plane medial (straight) instability. No one plane lateral (straight) instability. A Lachman test did not demonstrate one plane anterior instability.  Neurological:  Gait And Stance: A left-sided antalgic gait was observed.  ?  ?  TESTS  Imaging:  X-Ray Knee:  A complete knee x-ray with standing views was performed -of left knee.  AP and lateral view x-rays of the left knee with sunrise view of the patella were performed -of left knee.  ?  ?  IMPRESSIONS RADIOLOGY TEST  Narrowing of the left knee joint space bone on bone, of the left knee joint space, and osteophytes arising from the left knee.? Grade 4 changes of sclerosis in the lateral compartment with tricompartmental osteophytes. ?Valgus deformity. ?Bone-on-bone in the lateral and patellofemoral compartments.  ?  ?  ?  Assessment/Plan  1.?Primary osteoarthritis of left knee?M17.12  ?Robotic assisted left total knee arthroplasty  ?  Risk,benefits, alternatives, and  complications of operative and nonoperative treatments were discussed with patient and family. They understand, agree, and want to proceed with operation/procedure.  ?  Ordered:  Clinic Follow-up PRN, 05/10/21 14:06:00 CDT[facility]  [facility] Ext Lower Robotic Left, Routine, 05/10/21 14:08:00 CDT, Other (please specify), None, Stretcher, Patient Has IV?, Pre op 6/4/21 Sx 6/24/21, Rad Type, Order for future visit, Primary osteoarthritis of left knee, Schedule this test, [facility], 0...  Office/Outpatient Visit Level 3 Established 98162 PC, Primary osteoarthritis of left knee  Anxiety  Hyperlipemia  Hypertension  Impaired gait and mobility  [facility], 05/10/21 14:06:00 CDT  ?  2.?Anxiety?F41.1  Ordered:  Clinic Follow-up PRN, 05/10/21 14:06:00 CDT, Future Order, Orthopaedics  Office/Outpatient Visit Level 3 Established 48412 PC, Primary osteoarthritis of left knee  Anxiety  Hyperlipemia  Hypertension  Impaired gait and mobility  History of total left hip arthroplasty, [facility], 05/10/21 14:06:00 CDT  ?  3.?Hyperlipemia?E78.2  Ordered:  Clinic Follow-up PRN, 05/10/21 14:06:00 CDT, Future Order, Orthopaedics  Office/Outpatient Visit Level 3 Established 63263 PC, Primary osteoarthritis of left knee  Anxiety  Hyperlipemia  Hypertension  Impaired gait and mobility  [facility] 05/[facility]21 14:06:00 CDT  ?  4.?Hypertension?I10  Ordered:  Clinic Follow-up PRN, 05/10/21 14:06:00 CDT, Future Order, Orthopaedics  Office/Outpatient Visit Level 3 Established 80949 PC, Primary osteoarthritis of left knee  Anxiety  Hyperlipemia  Hypertension  Impaired gait and mobility  History of total left hip arthroplasty, [facility], 05/10/21 14:06:00 CDT  ?  5.?Impaired gait and mobility?R26.89  Ordered:  Clinic Follow-up PRN, 05/10/21 14:06:00 CDT,  Future Order, Orthopaedics  Office/Outpatient Visit Level 3 Established 97915 PC, Primary osteoarthritis of left knee  Anxiety  Hyperlipemia  Hypertension  Impaired gait and mobility  [facility] 14:06:00 CDT  ?  6.?History of total left hip arthroplasty?Z96.642  Ordered:  Clinic Follow-up PRN, 05/10/21 14:06:00 CDT, [facility]  Office/Outpatient Visit Level 3 Established 92557 PC, Primary osteoarthritis of left knee  Anxiety  Hyperlipemia  Hypertension  Impaired gait and mobility  [facility] 05/10[facility]21 14:06:00 CDT  ?  7.?Diabetes mellitus?E11.9  ?  8.?Obesity?E66.9  ?  Orders:  XR Knee Left 4 or More Views, Routine, 05/10/21 13:45:00 CDT, None, Stretcher, Patient Has IV?, Rad Type, Knee pain, Not Scheduled, 05/10/21 13:45:00 CDT  Referrals  Clinic Follow-up PRN, 05/10/21 14:06:00 CDT, Future Order, [facility]   Problem List/Past Medical History  Ongoing  Acetabular protrusion  Anxiety  Arthritis  BMI 36.0-36.9,adult  Diabetes mellitus  History of total left hip arthroplasty  Hyperlipemia  Hypertension  Impaired gait and mobility  Left hip pain  Need for influenza vaccination  Need for pneumococcal vaccine  Obesity  Primary osteoarthritis of left hip  Primary osteoarthritis of left knee  Reflux  Well adult exam  Historical  Bruises easily  chronic sinus infections  left knee pain  Uterine fibroids  Wears glasses  Procedure/Surgical History  Colonoscopy (04/29/2021)  TALIB EDGE Total Hip Arthroplasty (Left) (02/02/2021)  Replacement of Left Hip Joint with Synthetic Substitute, Uncemented, Open Approach (02/02/2021)  Arthroscopy, knee, surgical; with meniscectomy (medial AND lateral, including any meniscal shaving) including debridement/shaving of articular cartilage (chondroplasty), same or separate compartment(s), when performed. (11/15/2012)  Excision of semilunar cartilage  of knee (11/15/2012)  Cholecystectomy  Hysterectomy  Tonsillectomy   Medications  acetaminophen-oxycodone 325 mg-5 mg oral tablet, 1 tab(s), Oral, q4hr,? ?Not Taking, Completed Rx  ALPRAZolam 0.25 mg oral tablet, disintegrating, 0.25 mg= 1 tab(s), Oral, BID, PRN,? ?Not taking: PRN  aspirin 81 mg oral Delayed Release (EC) tablet, 81 mg= 1 tab(s), Oral, BID  escitalopram 20 mg oral tablet, See Instructions  Glucometer, See Instructions  hydrochlorothiazide-lisinopril 12.5 mg-20 mg oral tablet, 1 tab(s), Oral, Daily, 3 refills  melatonin 10 mg oral tablet, 10 mg= 1 tab(s), Oral, Once a day (at bedtime), PRN  metFORMIN 500 mg oral tablet, extended release, 1000 mg= 2 tab(s), Oral, Daily, 3 refills  methocarbamol 750 mg oral tablet, 750 mg= 1 tab(s), Oral, q8hr  mupirocin 2% topical ointment, Both Nostrils, BID  MVI with Minerals (Adult Tab), 1 tab(s), Oral, Daily  potassium acid phosphate, 1 tab = 1000mg, Oral, Daily  Pravastatin 20 mg Oral Tab, 20 mg= 1 tab(s), Oral, Daily, 3 refills  prednisONE 20 mg oral tablet, 40 mg= 2 tab(s), Oral, Daily,? ?Not Taking, Completed Rx  Suprep Bowel Prep Kit oral liquid  Vitamin D3, 1 tab, Oral, Daily  Allergies  No Known Allergies  Social History  Abuse/Neglect  No, 05/10/2021  No, No, Yes, 03/03/2021  No, 02/02/2021  Alcohol  Current, Wine, Liquor, 1-2 times per month, 11/14/2012  Employment/School  Retired, Work/School description: Retired from [employer]. Highest education level: High school., 12/02/2020  Financial/Legal Situation  None, 01/20/2021  Home/Environment  Lives with Spouse. Living situation: Home/Independent. Glucose monitoring, 12/02/2020    Never in , 12/02/2020  Nutrition/Health  Diabetic, Good, 12/02/2020  Sexual  Sexually active: Yes., 12/02/2020  Spiritual/Cultural  Jew, 12/02/2020  Substance Use  Never, 12/02/2020  Tobacco - Denies Tobacco Use, 11/14/2012  Never (less than 100 in lifetime), N/A, Household tobacco concerns: No. Smokeless Tobacco  Use: Never., 05/10/2021  Family History  Cancer: Mother and Father.  Diabetes mellitus type 1.: Father and Brother.  Stroke: Brother.  Immunizations  Vaccine Date Status   influenza virus vaccine, inactivated 01/20/2021 Given   pneumococcal 23-polyvalent vaccine 12/02/2020 Given   influenza virus vaccine, inactivated 10/06/2011 Recorded   Health Maintenance  Health Maintenance  ???Pending?(in the next year)  ??? ??Due?  ??? ? ? ?Bone Density Screening due??05/10/21??Variable frequency  ??? ? ? ?Diabetes Maintenance-Eye Exam due??05/10/21??Unknown Frequency  ??? ? ? ?Hypertension Maintenance-Medication Prescribed due??05/10/21??and every 1??year(s)  ??? ? ? ?Tetanus Vaccine due??05/10/21??and every 10??year(s)  ??? ? ? ?Zoster Vaccine due??05/10/21??Unknown Frequency  ??? ??Due In Future?  ??? ? ? ?Influenza Vaccine not due until??10/01/21??and every 1??day(s)  ??? ? ? ?Diabetes Maintenance-Foot Exam not due until??12/02/21??and every 1??year(s)  ??? ? ? ?Hypertension Management-Education not due until??12/02/21??and every 1??year(s)  ??? ? ? ?Diabetes Maintenance-Fasting Lipid Profile not due until??12/02/21??and every 1??year(s)  ??? ? ? ?ADL Screening not due until??12/02/21??and every 1??year(s)  ??? ? ? ?Obesity Screening not due until??01/01/22??and every 1??year(s)  ??? ? ? ?Advance Directive not due until??01/02/22??and every 1??year(s)  ??? ? ? ?Alcohol Misuse Screening not due until??01/02/22??and every 1??year(s)  ??? ? ? ?Cognitive Screening not due until??01/02/22??and every 1??year(s)  ??? ? ? ?Fall Risk Assessment not due until??01/02/22??and every 1??year(s)  ??? ? ? ?Functional Assessment not due until??01/02/22??and every 1??year(s)  ??? ? ? ?Diabetes Maintenance-HgbA1c not due until??01/14/22??and every 1??year(s)  ??? ? ? ?Medicare Annual Wellness Exam not due until??01/20/22??and every 1??year(s)  ??? ? ? ?Hypertension Management-BMP not due until??02/04/22??and every 1??year(s)  ??? ? ?  ?Diabetes Maintenance-Serum Creatinine not due until??02/04/22??and every 1??year(s)  ??? ? ? ?Aspirin Therapy for CVD Prevention not due until??02/04/22??and every 1??year(s)  ???Satisfied?(in the past 1 year)  ??? ??Satisfied?  ??? ? ? ?ADL Screening on??12/02/20.??Satisfied by Christelle Huggins LPN  ??? ? ? ?Advance Directive on??02/02/21.??Satisfied by Kourtney Simon RN.  ??? ? ? ?Alcohol Misuse Screening on??01/20/21.??Satisfied by Christelle Huggins LPN  ??? ? ? ?Aspirin Therapy for CVD Prevention on??02/04/21.??Satisfied by Anita Jean-Baptiste NP  ??? ? ? ?Blood Pressure Screening on??05/10/21.??Satisfied by Denise Reich.  ??? ? ? ?Body Mass Index Check on??05/10/21.??Satisfied by Denise Reich.  ??? ? ? ?Breast Cancer Screening on??01/27/21.??Satisfied by Nish Calhoun MD  ??? ? ? ?Cognitive Screening on??01/20/21.??Satisfied by Christelle Huggins LPN  ??? ? ? ?Colorectal Screening on??04/29/21.??Satisfied by Nish Calhoun MD  ??? ? ? ?Depression Screening on??05/10/21.??Satisfied by Denise Reich  ??? ? ? ?Diabetes Maintenance-Serum Creatinine on??02/04/21.??Satisfied by Myrtle Romero  ??? ? ? ?Diabetes Maintenance-HgbA1c on??01/14/21.??Satisfied by Andressa Turcios  ??? ? ? ?Diabetes Maintenance-Foot Exam on??12/02/20.??Satisfied by Nish Calhoun MD  ??? ? ? ?Diabetes Maintenance-Fasting Lipid Profile on??12/02/20.??Satisfied by Clementina Zapien  ??? ? ? ?Diabetes Maintenance-Microalbumin on??12/02/20.??Satisfied by John Luna  ??? ? ? ?Diabetes Screening on??02/04/21.??Satisfied by Myrtle Romero  ??? ? ? ?Fall Risk Assessment on??05/10/21.??Satisfied by Denise Reich.  ??? ? ? ?Functional Assessment on??03/03/21.??Satisfied by Sailaja Antoine  ??? ? ? ?Hypertension Management-Blood Pressure on??05/10/21.??Satisfied by Denise Reich.  ??? ? ? ?Hypertension Management-BMP on??02/04/21.??Satisfied by Myrtle Romero  ??? ? ? ?Hypertension Management-Education  on??12/02/20.??Satisfied by Christelle Huggins LPN  ??? ? ? ?Influenza Vaccine on??02/02/21.??Satisfied by Alfred KELLOGG, Kourtney JACQUES  ??? ? ? ?Lipid Screening on??12/02/20.??Satisfied by Clementina Zapien  ??? ? ? ?Medicare Annual Wellness Exam on??01/20/21.??Satisfied by Nish Calhoun MD  ??? ? ? ?Obesity Screening on??05/10/21.??Satisfied by Denise Reich  ??? ? ? ?Pneumococcal Vaccine on??12/02/20.??Satisfied by Shanice Lopez  ?

## 2022-06-02 DIAGNOSIS — F41.9 ANXIETY: Primary | ICD-10-CM

## 2022-06-02 RX ORDER — BUSPIRONE HYDROCHLORIDE 5 MG/1
5 TABLET ORAL 3 TIMES DAILY
COMMUNITY
Start: 2022-02-22 | End: 2022-06-02 | Stop reason: SDUPTHER

## 2022-06-03 RX ORDER — BUSPIRONE HYDROCHLORIDE 5 MG/1
5 TABLET ORAL 3 TIMES DAILY
Qty: 90 TABLET | Refills: 5 | Status: SHIPPED | OUTPATIENT
Start: 2022-06-03 | End: 2022-06-06 | Stop reason: SDUPTHER

## 2022-06-06 ENCOUNTER — TELEPHONE (OUTPATIENT)
Dept: FAMILY MEDICINE | Facility: CLINIC | Age: 67
End: 2022-06-06
Payer: MEDICARE

## 2022-06-06 DIAGNOSIS — F41.9 ANXIETY: ICD-10-CM

## 2022-06-06 RX ORDER — BUSPIRONE HYDROCHLORIDE 5 MG/1
5 TABLET ORAL 3 TIMES DAILY
Qty: 90 TABLET | Refills: 5 | Status: SHIPPED | OUTPATIENT
Start: 2022-06-06 | End: 2022-08-15 | Stop reason: SDUPTHER

## 2022-06-06 NOTE — TELEPHONE ENCOUNTER
----- Message from Skyler Turcios sent at 6/3/2022 10:19 AM CDT -----  .Type:  Needs Medical Advice    Who Called: Rosetta  Symptoms (please be specific):    How long has patient had these symptoms:    Pharmacy name and phone #:  [facility] has not received anything from the doctor.   Would the patient rather a call back or a response via [facility]?   Best Call Back Number: [phone] she told me but she never calls back her words.   Additional Information:

## 2022-06-06 NOTE — TELEPHONE ENCOUNTER
Type:  Patient Returning Call    Who Called:self  Who Left Message for Patient:lolita  Does the patient know what this is regarding?:no  Would the patient rather a call back or a response via [facility]? Call back  Best Call Back Number:[phone]  Additional Information: pt request that a detail message be left please advise

## 2022-08-15 DIAGNOSIS — F41.9 ANXIETY: ICD-10-CM

## 2022-08-15 RX ORDER — BUSPIRONE HYDROCHLORIDE 5 MG/1
5 TABLET ORAL 3 TIMES DAILY
Qty: 90 TABLET | Refills: 5 | Status: SHIPPED | OUTPATIENT
Start: 2022-08-15 | End: 2022-11-16 | Stop reason: SDUPTHER

## 2022-09-16 ENCOUNTER — HOSPITAL ENCOUNTER (OUTPATIENT)
Dept: RADIOLOGY | Facility: CLINIC | Age: 67
Discharge: HOME | End: 2022-09-16
Attending: PHYSICIAN ASSISTANT
Payer: MEDICARE

## 2022-09-16 ENCOUNTER — OFFICE VISIT (OUTPATIENT)
Dept: ORTHOPEDICS | Facility: CLINIC | Age: 67
End: 2022-09-16
Payer: MEDICARE

## 2022-09-16 VITALS — HEIGHT: 66 IN | BODY MASS INDEX: 36.96 KG/M2 | WEIGHT: 230 LBS

## 2022-09-16 DIAGNOSIS — M48.02 FORAMINAL STENOSIS OF CERVICAL REGION: ICD-10-CM

## 2022-09-16 DIAGNOSIS — G89.29 CHRONIC PAIN OF BOTH KNEES: ICD-10-CM

## 2022-09-16 DIAGNOSIS — M25.561 CHRONIC PAIN OF BOTH KNEES: ICD-10-CM

## 2022-09-16 DIAGNOSIS — M25.561 CHRONIC PAIN OF BOTH KNEES: Primary | ICD-10-CM

## 2022-09-16 DIAGNOSIS — Z96.652 HISTORY OF TOTAL KNEE REPLACEMENT, LEFT: ICD-10-CM

## 2022-09-16 DIAGNOSIS — M25.562 CHRONIC PAIN OF BOTH KNEES: ICD-10-CM

## 2022-09-16 DIAGNOSIS — M25.562 CHRONIC PAIN OF BOTH KNEES: Primary | ICD-10-CM

## 2022-09-16 DIAGNOSIS — G89.29 CHRONIC PAIN OF BOTH KNEES: Primary | ICD-10-CM

## 2022-09-16 PROCEDURE — 73562 XR KNEE 3 VIEW LEFT: ICD-10-PCS | Mod: LT,,, | Performed by: PHYSICIAN ASSISTANT

## 2022-09-16 PROCEDURE — 73562 X-RAY EXAM OF KNEE 3: CPT | Mod: LT,,, | Performed by: PHYSICIAN ASSISTANT

## 2022-09-16 PROCEDURE — 99213 OFFICE O/P EST LOW 20 MIN: CPT | Mod: ,,, | Performed by: PHYSICIAN ASSISTANT

## 2022-09-16 PROCEDURE — 99213 PR OFFICE/OUTPT VISIT, EST, LEVL III, 20-29 MIN: ICD-10-PCS | Mod: ,,, | Performed by: PHYSICIAN ASSISTANT

## 2022-09-16 NOTE — LETTER
[facility]  [address] [place] [address]  Phone: [phone]  Fax: [phone]        Name:Rosetta Loving  :1955   Date:2022       The above mentioned patient was seen by me on 22 and is unable to return to work until 22.     If you should have any questions, please contact my office at [phone]    KELIN Campuzano  bg

## 2022-09-16 NOTE — PROGRESS NOTES
Chief Complaint:   Chief Complaint   Patient presents with    Right Hip - Pain, Injury     Pt fell on right hip on 9/9/22. Pain is going up her right side and into her neck, and down her right leg. Taking tylenol for pain. Icing hip and using heating pads off and on. Had surgery on left hip and knee in February and June 2021.        History of present illness:    66-year-old female presents office today for evaluation for left knee.  She fell last week landing on her right side.  She bruised her right hip ribs and right knee.  She was seen at our 's emergency room and was told nothing was fractured.  She is also complaining of neck pain that she was told nothing was fractured as well.  She is here today to evaluate her left knee.  She has a history of left total knee arthroplasty performed 1 year ago.  Currently she has no knee pain but just wanted to have it evaluated    Past Medical History:   Diagnosis Date    Diabetes mellitus, type 2     Hypertension        Past Surgical History:   Procedure Laterality Date    GALLBLADDER SURGERY      HIP SURGERY      HYSTERECTOMY      KNEE SURGERY      TONSILLECTOMY         Current Outpatient Medications   Medication Sig    busPIRone (BUSPAR) 5 MG Tab Take 1 tablet (5 mg total) by mouth 3 (three) times daily.     No current facility-administered medications for this visit.       Review of patient's allergies indicates:  No Known Allergies    History reviewed. No pertinent family history.    Social History     Socioeconomic History    Marital status: Unknown   Tobacco Use    Smoking status: Never    Smokeless tobacco: Never   Substance and Sexual Activity    Alcohol use: Yes         Review of Systems:    Constitution:   Denies chills, fever, and sweats.  HENT:   Denies headaches or blurry vision.  Cardiovascular:  Denies chest pain or irregular heart beat.  Respiratory:   Denies cough or shortness of breath.  Gastrointestinal:  Denies abdominal pain, nausea, or  "vomiting.  Musculoskeletal:   Denies muscle cramps.  Neurological:   Denies dizziness or focal weakness.  Psychiatric/Behavior: Normal mental status.  Hematology/Lymph:  Denies bleeding problem or easy bruising/bleeding.  Skin:    Denies rash or suspicious lesions.    Examination:    Vital Signs:    Vitals:    09/16/22 0938   Weight: 104.3 kg (230 lb)   Height: 5' 6" (1.676 m)   PainSc:   5       Body mass index is 37.12 kg/m².    Constitution:   Well-developed, well nourished patient in no acute distress.  Neurological:   Alert and oriented x 3 and cooperative to examination.     Psychiatric/Behavior: Normal mental status.  Respiratory:   No shortness of breath.  Eyes:    Extraoccular muscles intact  Skin:    No scars, rash or suspicious lesions.    Physical Exam:     Left Knee     No swelling, warmth or tenderness.    Well healed scar    No instability of the knee in mid or deep flexion     Active flexion 120 degrees   Active extension 0 degrees   No weakness was observed.    Sensation intact distally    Intact pedal pulses   X-Ray Knee:   A complete knee x-ray with standing 3 views was performed -of left knee.   Impressions Radiology Test   X-ray of knee was performed intact  knee implant without signs of loosening or subsidence.        Assessment:     Chronic pain of both knees  -     X-Ray Knee 3 View Left; Future; Expected date: 09/16/2022    History of total knee replacement, left    Foraminal stenosis of cervical region      Plan:      I have discussed exam and x-ray findings with the patient today.  She is asymptomatic and wanted to have her left knee x-rayed.  There is no fracture, loosening or subsidence.  We will hold her out of work for 1 week so she can recover from her right knee hip and rib contusions as well as her neck pain.  Her CT of the cervical spine showed multilevel cervical foraminal stenosis.  If she has continued symptoms with her neck then we will set her up with some physical " therapy.  She will start taking Naprosyn in the meantime for pain relief  .  She will follow-up with us as needed    No follow-ups on file.    DISCLAIMER: This note may have been dictated using voice recognition software and may contain grammatical errors.

## 2022-09-17 ENCOUNTER — HISTORICAL (OUTPATIENT)
Dept: ADMINISTRATIVE | Facility: HOSPITAL | Age: 67
End: 2022-09-17
Payer: MEDICARE

## 2022-11-10 ENCOUNTER — TELEPHONE (OUTPATIENT)
Dept: ADMINISTRATIVE | Facility: HOSPITAL | Age: 67
End: 2022-11-10
Payer: MEDICARE

## 2022-11-10 NOTE — TELEPHONE ENCOUNTER
----- Message from Farida Nye MA sent at 11/10/2022  9:26 AM CST -----  Regarding: FW: refill  Pt needs to be scheduled for med refill on metformin    ----- Message -----  From: Ashlie Smith  Sent: 11/10/2022   9:14 AM CST  To: [facility] Staff  Subject: refill                                           Type:  RX Refill Request    Who Called: Rosetta  Refill or New Rx:refill  RX Name and Strength:Metformin  How is the patient currently taking it? (ex. 1XDay):  Is this a 30 day or 90 day RX:  Preferred Pharmacy with phone number:[facility]  Local or Mail Order:Mail  Ordering Provider:  Would the patient rather a call back or a response via [facility]? Yes   Best Call Back Number:[phone]  Additional Information: patient is a patient of Milind Farah...states she needs a refill of metformin.  She requested a call back or message left on .

## 2022-11-11 ENCOUNTER — TELEPHONE (OUTPATIENT)
Dept: FAMILY MEDICINE | Facility: CLINIC | Age: 67
End: 2022-11-11
Payer: MEDICARE

## 2022-11-11 DIAGNOSIS — Z00.00 WELLNESS EXAMINATION: ICD-10-CM

## 2022-11-11 DIAGNOSIS — I10 HYPERTENSION, UNSPECIFIED TYPE: Primary | ICD-10-CM

## 2022-11-11 DIAGNOSIS — E11.9 TYPE 2 DIABETES MELLITUS WITHOUT COMPLICATION, WITHOUT LONG-TERM CURRENT USE OF INSULIN: ICD-10-CM

## 2022-11-11 NOTE — TELEPHONE ENCOUNTER
----- Message from Farida Nye MA sent at 11/10/2022  2:21 PM CST -----  Needs blood work orders    ----- Message -----  From: All Downing  Sent: 11/10/2022   1:59 PM CST  To: [facility] Hassan Staff    .Caller is requesting to schedule their Lab appointment prior to annual appointment.  Order is not listed in EPIC.  Please enter order and contact patient to schedule.    Name of Caller:pt    Preferred Date and Time of Labs:    Date of [facility] Appointment:11/16    Where would they like the lab performed?[facility]    Would the patient rather a call back or a response via [facility]? Call back    Best Call Back Number:[phone]    Additional Information:pt is calling to see if they have to do labs before their next appt

## 2022-11-11 NOTE — TELEPHONE ENCOUNTER
Orders placed.     Orders Placed This Encounter    Lipid Panel    Comprehensive Metabolic Panel    Hemoglobin A1C    Microalbumin/Creatinine Ratio, Urine

## 2022-11-16 ENCOUNTER — OFFICE VISIT (OUTPATIENT)
Dept: FAMILY MEDICINE | Facility: CLINIC | Age: 67
End: 2022-11-16
Payer: MEDICARE

## 2022-11-16 VITALS
OXYGEN SATURATION: 97 % | SYSTOLIC BLOOD PRESSURE: 146 MMHG | BODY MASS INDEX: 38.38 KG/M2 | WEIGHT: 238.81 LBS | DIASTOLIC BLOOD PRESSURE: 90 MMHG | HEIGHT: 66 IN | RESPIRATION RATE: 17 BRPM | TEMPERATURE: 98 F | HEART RATE: 73 BPM

## 2022-11-16 DIAGNOSIS — F32.A ANXIETY AND DEPRESSION: Primary | ICD-10-CM

## 2022-11-16 DIAGNOSIS — Z13.31 POSITIVE DEPRESSION SCREENING: ICD-10-CM

## 2022-11-16 DIAGNOSIS — F41.9 ANXIETY: ICD-10-CM

## 2022-11-16 DIAGNOSIS — E11.9 TYPE 2 DIABETES MELLITUS WITHOUT COMPLICATION, WITHOUT LONG-TERM CURRENT USE OF INSULIN: ICD-10-CM

## 2022-11-16 DIAGNOSIS — F41.9 ANXIETY AND DEPRESSION: Primary | ICD-10-CM

## 2022-11-16 DIAGNOSIS — I10 HYPERTENSION, UNSPECIFIED TYPE: ICD-10-CM

## 2022-11-16 DIAGNOSIS — E78.00 HYPERCHOLESTEREMIA: ICD-10-CM

## 2022-11-16 PROCEDURE — G0008 FLU VACCINE - QUADRIVALENT - HIGH DOSE (65+) PRESERVATIVE FREE IM: ICD-10-PCS | Mod: ,,, | Performed by: PHYSICIAN ASSISTANT

## 2022-11-16 PROCEDURE — 99214 OFFICE O/P EST MOD 30 MIN: CPT | Mod: ,,, | Performed by: PHYSICIAN ASSISTANT

## 2022-11-16 PROCEDURE — 90662 FLU VACCINE - QUADRIVALENT - HIGH DOSE (65+) PRESERVATIVE FREE IM: ICD-10-PCS | Mod: ,,, | Performed by: PHYSICIAN ASSISTANT

## 2022-11-16 PROCEDURE — 90662 IIV NO PRSV INCREASED AG IM: CPT | Mod: ,,, | Performed by: PHYSICIAN ASSISTANT

## 2022-11-16 PROCEDURE — 99214 PR OFFICE/OUTPT VISIT, EST, LEVL IV, 30-39 MIN: ICD-10-PCS | Mod: ,,, | Performed by: PHYSICIAN ASSISTANT

## 2022-11-16 PROCEDURE — G0008 ADMIN INFLUENZA VIRUS VAC: HCPCS | Mod: ,,, | Performed by: PHYSICIAN ASSISTANT

## 2022-11-16 RX ORDER — BUSPIRONE HYDROCHLORIDE 5 MG/1
10 TABLET ORAL 3 TIMES DAILY
Qty: 540 TABLET | Refills: 0 | Status: SHIPPED | OUTPATIENT
Start: 2022-11-16 | End: 2022-11-16 | Stop reason: SDUPTHER

## 2022-11-16 RX ORDER — METFORMIN HYDROCHLORIDE 500 MG/1
500 TABLET, EXTENDED RELEASE ORAL 2 TIMES DAILY
Qty: 180 TABLET | Refills: 1 | Status: SHIPPED | OUTPATIENT
Start: 2022-11-16 | End: 2023-08-29 | Stop reason: SDUPTHER

## 2022-11-16 RX ORDER — PRAVASTATIN SODIUM 40 MG/1
20 TABLET ORAL DAILY
Qty: 90 TABLET | Refills: 1 | Status: SHIPPED | OUTPATIENT
Start: 2022-11-16 | End: 2023-08-29 | Stop reason: SDUPTHER

## 2022-11-16 RX ORDER — LISINOPRIL AND HYDROCHLOROTHIAZIDE 12.5; 2 MG/1; MG/1
1 TABLET ORAL DAILY
Qty: 90 TABLET | Refills: 1 | Status: SHIPPED | OUTPATIENT
Start: 2022-11-16 | End: 2023-08-29 | Stop reason: SDUPTHER

## 2022-11-16 RX ORDER — METFORMIN HYDROCHLORIDE 500 MG/1
500 TABLET, EXTENDED RELEASE ORAL 2 TIMES DAILY
COMMUNITY
Start: 2022-08-25 | End: 2022-11-16 | Stop reason: SDUPTHER

## 2022-11-16 RX ORDER — ESCITALOPRAM OXALATE 20 MG/1
20 TABLET ORAL DAILY
COMMUNITY
Start: 2022-08-25 | End: 2022-11-16 | Stop reason: SDUPTHER

## 2022-11-16 RX ORDER — BUSPIRONE HYDROCHLORIDE 10 MG/1
10 TABLET ORAL 3 TIMES DAILY
Qty: 270 TABLET | Refills: 1 | Status: SHIPPED | OUTPATIENT
Start: 2022-11-16 | End: 2023-08-29 | Stop reason: SDUPTHER

## 2022-11-16 RX ORDER — ESCITALOPRAM OXALATE 20 MG/1
20 TABLET ORAL DAILY
Qty: 90 TABLET | Refills: 1 | Status: SHIPPED | OUTPATIENT
Start: 2022-11-16 | End: 2023-08-29 | Stop reason: SDUPTHER

## 2022-11-16 RX ORDER — LISINOPRIL AND HYDROCHLOROTHIAZIDE 12.5; 2 MG/1; MG/1
1 TABLET ORAL DAILY
COMMUNITY
Start: 2022-08-25 | End: 2022-11-16 | Stop reason: SDUPTHER

## 2022-11-16 RX ORDER — PRAVASTATIN SODIUM 40 MG/1
20 TABLET ORAL DAILY
COMMUNITY
Start: 2022-10-29 | End: 2022-11-16 | Stop reason: SDUPTHER

## 2022-11-16 NOTE — PROGRESS NOTES
SUBJECTIVE:     History of Present Illness      Chief Complaint: Medication Refill    HPI:  Patient is a 66 y.o. year old female who presents to clinic for medication refill.  Patient has past medical history of type 2 diabetes, hypertension, hyperlipidemia, GERD, and osteoarthritis.    Only complaint today is worsening anxiety and depressive symptoms. Patients  is currently on hospice and she is the main caregiver for him which can be overwhelming for her. Patient reports she will sometimes take Lexapro two times per day because of this. She is also on Buspar 5 mg TID. Denies suicidal thoughts or ideation. Denies thoughts of hurting herself or others. Denies hallucinations.     Does not take home blood sugar readings. A1c performed 11/14/2022 6.6 which is up from 6.4 in January. Not following a diabetic diet. Compliant with medication. Denies increased appetite, denies peripheral neuropathy, denies visual disturbance, denies urinary frequency, denies edema, denies nausea and denies vomiting.     BP in clinic today slightly elevated at 146/90. Patient reports she takes home blood pressures and they always run around 120/80 or less. Patient states any time she is at a doctor's office BP is slightly elevated. She is compliant with BP medication. Currently on lisinopril hydrochlorothiazide 20-12.5 mg daily. Denies headaches, changes in vision, dizziness, chest pain, SOB, palpitations, and  peripheral edema. Compliant with statin.     History of hypercholesteremia. Currently on pravastatin 20 mg daily. Compliant with medication. Denies side effects including myalgias or changes to urine.     Review of Systems:  A comprehensive review of systems was performed and was negative except as noted in HPI     Previous History      Review of patient's allergies indicates:  No Known Allergies    Past Medical History:   Diagnosis Date    Diabetes mellitus, type 2     Hypertension      Current Outpatient Medications  "  Medication Instructions    busPIRone (BUSPAR) 10 mg, Oral, 3 times daily    EScitalopram oxalate (LEXAPRO) 20 mg, Oral, Daily    lisinopriL-hydrochlorothiazide (PRINZIDE,ZESTORETIC) 20-12.5 mg per tablet 1 tablet, Oral, Daily    metFORMIN (GLUCOPHAGE-XR) 500 mg, Oral, 2 times daily    pravastatin (PRAVACHOL) 20 mg, Oral, Daily     Past Surgical History:   Procedure Laterality Date    CHOLECYSTECTOMY  1994    Yes    GALLBLADDER SURGERY      HIP SURGERY      HYSTERECTOMY      KNEE SURGERY      TONSILLECTOMY       Family History   Problem Relation Age of Onset    Arthritis Mother         Yes    Cancer Mother         Breast    Depression Mother         Yes    Hypertension Mother         Yes    Mental illness Mother         Yes    Diabetes Father         Yes       Social History     Tobacco Use    Smoking status: Never     Passive exposure: Never    Smokeless tobacco: Never    Tobacco comments:     Never   Substance Use Topics    Alcohol use: Yes     Alcohol/week: 1.0 standard drink     Types: 1 Glasses of wine per week     Comment: Social    Drug use: Never        Health Maintenance      Health Maintenance   Topic Date Due    TETANUS VACCINE  Never done    DEXA Scan  Never done    Hepatitis C Screening  11/16/2023 (Originally 1955)    Mammogram  02/09/2023    Lipid Panel  01/10/2027       OBJECTIVE:     Physical Exam      Vital Signs Reviewed   BP (!) 146/90   Pulse 73   Temp 98.1 °F (36.7 °C) (Oral)   Resp 17   Ht 5' 6" (1.676 m)   Wt 108.3 kg (238 lb 12.8 oz)   SpO2 97%   BMI 38.54 kg/m²     Physical Exam:  General: Alert, well nourished, no acute distress, non-toxic appearing.   Eyes: Anicteric sclera, without conjunctival injection, normal lids, no purulent drainage, EOMs grossly intact.   Ears: No tragal tenderness. Tympanic membranes intact, pearly grey, without effusion or erythema and with a positive light reflex.   Mouth: Posterior pharynx without erythema. No exudate, ulcerations, or lesion. No " tonsillar swelling.   Neck: Supple, full ROM, no rigidity, no cervical adenopathy.   Cardio: Normal rate and rhythm without murmurs, gallops, or rubs.   Resp: Respirations even and unlabored, clear to auscultation bilaterally.   Skin: No rashes or open lesions noted.   MSK: No swelling. No abrasions or signs of trauma. Ambulating without assistance.   Neuro: CN1-12 intact grossly. No focal deficits noted. Facial expressions even.      Assessment/Plan      1. Anxiety and depression   Reports worsening anxiety and depression symptoms.  Patient is the primary caregiver for her  who is currently on hospice.  Denies suicidal thoughts and ideation.  Patient reports sometimes she will take double the amount of Lexapro. She is also on BuSpar 5 mg TID.   Discussed she is on the max dose of Lexapro and should not take medication more than prescribed. She expressed understanding.   Will increase Buspar from 5 mg TID to 10 mg TID.   Follow-up in six weeks to discuss response to increasing BuSpar and further plan of care.     2. Positive depression screening   See # 1     3. Anxiety   See #1     4. Hypertension, unspecified type   Denies headaches, blurred vision, dizziness, chest pain, SOB, headache, vision changes, palpitations, and  peripheral edema.    BP slightly elevated in clinic at 146/90.  Patient reports she takes home BP and readings are always less than 120/80.  Discussed with patient she should continue taking home blood pressures.  Discussed if they are consistently over 140/90 she should call the clinic for further instruction.  She expressed understanding.    Controlled with medication. Compliant with medication. Continue medication daily. Low-salt/DASH diet. Discussed lifestyle modifications. Encouraged aerobic exercise at least 30 minutes a day. Monitor BP with daily blood pressure goal less than 140/90.      5. Hypercholesteremia   Lipid panel performed 11/14/2022 showed total cholesterol of 195, TG of  76, HDL of 61,and LDL of 120. Readings have improved since January.     Compliant with statin. Denies side effects including myalgias or changes to urine. Trying to follow a low cholesterol diet.     Stable on medication Continue current management without change. Discussed and encouraged daily exercise at least 30 minutes 5 times per week. Encouraged low fat, low cholesterol diet. Reccomended increasing dietary fiber.      6. Type 2 diabetes mellitus without complication, without long-term current use of insulin   A1c performed 11/14/2022 6.6 which is up from 6.4 in January.   Not following diabetic diet. Will continue to monitor.  Current A1c is good, shows good/adequate diabetes control, continue current diabetic medical management. Discussed patient should follow healthy meal plan and limit simple carbohydrates. Physical activity, 30 minutes up to 5 times per week.        Follow up in 6 weeks to discuss response to increasing Buspar.   Patient will need DM follow up in February labs placed today.     Orders Placed This Encounter    Influenza - Quadrivalent - High Dose (65+) (PF) (IM)    Hemoglobin A1C    Lipid Panel w/ Direct LDL    Comprehensive Metabolic Panel    Microalbumin/Creatinine Ratio, Urine    lisinopriL-hydrochlorothiazide (PRINZIDE,ZESTORETIC) 20-12.5 mg per tablet    EScitalopram oxalate (LEXAPRO) 20 MG tablet    pravastatin (PRAVACHOL) 40 MG tablet    metFORMIN (GLUCOPHAGE-XR) 500 MG ER 24hr tablet    busPIRone (BUSPAR) 10 MG tablet      Medication List with Changes/Refills   New Medications    BUSPIRONE (BUSPAR) 10 MG TABLET    Take 1 tablet (10 mg total) by mouth 3 (three) times daily.   Changed and/or Refilled Medications    Modified Medication Previous Medication    ESCITALOPRAM OXALATE (LEXAPRO) 20 MG TABLET EScitalopram oxalate (LEXAPRO) 20 MG tablet       Take 1 tablet (20 mg total) by mouth once daily.    Take 20 mg by mouth once daily.    LISINOPRIL-HYDROCHLOROTHIAZIDE  (PRINZIDE,ZESTORETIC) 20-12.5 MG PER TABLET lisinopriL-hydrochlorothiazide (PRINZIDE,ZESTORETIC) 20-12.5 mg per tablet       Take 1 tablet by mouth once daily.    Take 1 tablet by mouth once daily.    METFORMIN (GLUCOPHAGE-XR) 500 MG ER 24HR TABLET metFORMIN (GLUCOPHAGE-XR) 500 MG ER 24hr tablet       Take 1 tablet (500 mg total) by mouth 2 (two) times daily.    Take 500 mg by mouth 2 (two) times daily.    PRAVASTATIN (PRAVACHOL) 40 MG TABLET pravastatin (PRAVACHOL) 40 MG tablet       Take 0.5 tablets (20 mg total) by mouth once daily.    Take 20 mg by mouth once daily.   Discontinued Medications    BUSPIRONE (BUSPAR) 5 MG TAB    Take 1 tablet (5 mg total) by mouth 3 (three) times daily.         Follow up in about 6 weeks (around 12/28/2022), or Discuss response to increasing Buspar, for Virtual Visit.    BETTY OliverosC    Future Appointments   Date Time Provider Department Center   12/28/2022  3:00 PM KELIN Gee [facility]             I have reviewed the positive depression score which warrants active treatment with psychotherapy and/or medications.

## 2023-01-04 ENCOUNTER — DOCUMENTATION ONLY (OUTPATIENT)
Dept: ADMINISTRATIVE | Facility: HOSPITAL | Age: 68
End: 2023-01-04
Payer: MEDICARE

## 2023-08-29 ENCOUNTER — OFFICE VISIT (OUTPATIENT)
Dept: FAMILY MEDICINE | Facility: CLINIC | Age: 68
End: 2023-08-29
Payer: MEDICARE

## 2023-08-29 ENCOUNTER — CLINICAL SUPPORT (OUTPATIENT)
Dept: FAMILY MEDICINE | Facility: CLINIC | Age: 68
End: 2023-08-29
Attending: STUDENT IN AN ORGANIZED HEALTH CARE EDUCATION/TRAINING PROGRAM
Payer: MEDICARE

## 2023-08-29 VITALS
WEIGHT: 240.38 LBS | OXYGEN SATURATION: 95 % | RESPIRATION RATE: 17 BRPM | DIASTOLIC BLOOD PRESSURE: 64 MMHG | HEIGHT: 66 IN | TEMPERATURE: 98 F | BODY MASS INDEX: 38.63 KG/M2 | SYSTOLIC BLOOD PRESSURE: 132 MMHG

## 2023-08-29 DIAGNOSIS — Z23 NEED FOR VACCINATION FOR STREP PNEUMONIAE: ICD-10-CM

## 2023-08-29 DIAGNOSIS — Z12.31 BREAST CANCER SCREENING BY MAMMOGRAM: ICD-10-CM

## 2023-08-29 DIAGNOSIS — E11.9 TYPE 2 DIABETES MELLITUS WITHOUT COMPLICATION, WITHOUT LONG-TERM CURRENT USE OF INSULIN: ICD-10-CM

## 2023-08-29 DIAGNOSIS — F41.8 DEPRESSION WITH ANXIETY: ICD-10-CM

## 2023-08-29 DIAGNOSIS — I10 HYPERTENSION, UNSPECIFIED TYPE: ICD-10-CM

## 2023-08-29 DIAGNOSIS — E66.01 SEVERE OBESITY (BMI 35.0-39.9) WITH COMORBIDITY: ICD-10-CM

## 2023-08-29 DIAGNOSIS — E78.2 MIXED HYPERLIPIDEMIA: ICD-10-CM

## 2023-08-29 PROBLEM — F33.9 DEPRESSION, RECURRENT: Status: ACTIVE | Noted: 2023-08-29

## 2023-08-29 PROCEDURE — 3288F FALL RISK ASSESSMENT DOCD: CPT | Mod: CPTII,,, | Performed by: STUDENT IN AN ORGANIZED HEALTH CARE EDUCATION/TRAINING PROGRAM

## 2023-08-29 PROCEDURE — 1126F PR PAIN SEVERITY QUANTIFIED, NO PAIN PRESENT: ICD-10-PCS | Mod: CPTII,,, | Performed by: STUDENT IN AN ORGANIZED HEALTH CARE EDUCATION/TRAINING PROGRAM

## 2023-08-29 PROCEDURE — 1101F PR PT FALLS ASSESS DOC 0-1 FALLS W/OUT INJ PAST YR: ICD-10-PCS | Mod: CPTII,,, | Performed by: STUDENT IN AN ORGANIZED HEALTH CARE EDUCATION/TRAINING PROGRAM

## 2023-08-29 PROCEDURE — 1159F MED LIST DOCD IN RCRD: CPT | Mod: CPTII,,, | Performed by: STUDENT IN AN ORGANIZED HEALTH CARE EDUCATION/TRAINING PROGRAM

## 2023-08-29 PROCEDURE — 3078F PR MOST RECENT DIASTOLIC BLOOD PRESSURE < 80 MM HG: ICD-10-PCS | Mod: CPTII,,, | Performed by: STUDENT IN AN ORGANIZED HEALTH CARE EDUCATION/TRAINING PROGRAM

## 2023-08-29 PROCEDURE — 1159F PR MEDICATION LIST DOCUMENTED IN MEDICAL RECORD: ICD-10-PCS | Mod: CPTII,,, | Performed by: STUDENT IN AN ORGANIZED HEALTH CARE EDUCATION/TRAINING PROGRAM

## 2023-08-29 PROCEDURE — 3288F PR FALLS RISK ASSESSMENT DOCUMENTED: ICD-10-PCS | Mod: CPTII,,, | Performed by: STUDENT IN AN ORGANIZED HEALTH CARE EDUCATION/TRAINING PROGRAM

## 2023-08-29 PROCEDURE — 3008F PR BODY MASS INDEX (BMI) DOCUMENTED: ICD-10-PCS | Mod: CPTII,,, | Performed by: STUDENT IN AN ORGANIZED HEALTH CARE EDUCATION/TRAINING PROGRAM

## 2023-08-29 PROCEDURE — 4010F PR ACE/ARB THEARPY RXD/TAKEN: ICD-10-PCS | Mod: CPTII,,, | Performed by: STUDENT IN AN ORGANIZED HEALTH CARE EDUCATION/TRAINING PROGRAM

## 2023-08-29 PROCEDURE — 3075F SYST BP GE 130 - 139MM HG: CPT | Mod: CPTII,,, | Performed by: STUDENT IN AN ORGANIZED HEALTH CARE EDUCATION/TRAINING PROGRAM

## 2023-08-29 PROCEDURE — G0009 ADMIN PNEUMOCOCCAL VACCINE: HCPCS | Mod: ,,, | Performed by: STUDENT IN AN ORGANIZED HEALTH CARE EDUCATION/TRAINING PROGRAM

## 2023-08-29 PROCEDURE — 4010F ACE/ARB THERAPY RXD/TAKEN: CPT | Mod: CPTII,,, | Performed by: STUDENT IN AN ORGANIZED HEALTH CARE EDUCATION/TRAINING PROGRAM

## 2023-08-29 PROCEDURE — 3075F PR MOST RECENT SYSTOLIC BLOOD PRESS GE 130-139MM HG: ICD-10-PCS | Mod: CPTII,,, | Performed by: STUDENT IN AN ORGANIZED HEALTH CARE EDUCATION/TRAINING PROGRAM

## 2023-08-29 PROCEDURE — 99214 PR OFFICE/OUTPT VISIT, EST, LEVL IV, 30-39 MIN: ICD-10-PCS | Mod: 25,,, | Performed by: STUDENT IN AN ORGANIZED HEALTH CARE EDUCATION/TRAINING PROGRAM

## 2023-08-29 PROCEDURE — 3078F DIAST BP <80 MM HG: CPT | Mod: CPTII,,, | Performed by: STUDENT IN AN ORGANIZED HEALTH CARE EDUCATION/TRAINING PROGRAM

## 2023-08-29 PROCEDURE — 99214 OFFICE O/P EST MOD 30 MIN: CPT | Mod: 25,,, | Performed by: STUDENT IN AN ORGANIZED HEALTH CARE EDUCATION/TRAINING PROGRAM

## 2023-08-29 PROCEDURE — 90732 PNEUMOCOCCAL POLYSACCHARIDE VACCINE 23-VALENT =>2YO SQ IM: ICD-10-PCS | Mod: ,,, | Performed by: STUDENT IN AN ORGANIZED HEALTH CARE EDUCATION/TRAINING PROGRAM

## 2023-08-29 PROCEDURE — 90732 PPSV23 VACC 2 YRS+ SUBQ/IM: CPT | Mod: ,,, | Performed by: STUDENT IN AN ORGANIZED HEALTH CARE EDUCATION/TRAINING PROGRAM

## 2023-08-29 PROCEDURE — 3008F BODY MASS INDEX DOCD: CPT | Mod: CPTII,,, | Performed by: STUDENT IN AN ORGANIZED HEALTH CARE EDUCATION/TRAINING PROGRAM

## 2023-08-29 PROCEDURE — 1101F PT FALLS ASSESS-DOCD LE1/YR: CPT | Mod: CPTII,,, | Performed by: STUDENT IN AN ORGANIZED HEALTH CARE EDUCATION/TRAINING PROGRAM

## 2023-08-29 PROCEDURE — 1126F AMNT PAIN NOTED NONE PRSNT: CPT | Mod: CPTII,,, | Performed by: STUDENT IN AN ORGANIZED HEALTH CARE EDUCATION/TRAINING PROGRAM

## 2023-08-29 PROCEDURE — G0009 PNEUMOCOCCAL POLYSACCHARIDE VACCINE 23-VALENT =>2YO SQ IM: ICD-10-PCS | Mod: ,,, | Performed by: STUDENT IN AN ORGANIZED HEALTH CARE EDUCATION/TRAINING PROGRAM

## 2023-08-29 RX ORDER — SEMAGLUTIDE 0.68 MG/ML
0.25 INJECTION, SOLUTION SUBCUTANEOUS
Qty: 3 ML | Refills: 0 | Status: SHIPPED | OUTPATIENT
Start: 2023-08-29 | End: 2023-08-30 | Stop reason: SDUPTHER

## 2023-08-29 RX ORDER — IBUPROFEN AND FAMOTIDINE 26.6; 8 MG/1; MG/1
800 TABLET ORAL 2 TIMES DAILY
COMMUNITY
Start: 2023-07-17 | End: 2023-08-29 | Stop reason: SINTOL

## 2023-08-29 RX ORDER — ESCITALOPRAM OXALATE 20 MG/1
20 TABLET ORAL DAILY
Qty: 30 TABLET | Refills: 2 | Status: SHIPPED | OUTPATIENT
Start: 2023-08-29 | End: 2023-10-10 | Stop reason: SDUPTHER

## 2023-08-29 RX ORDER — METFORMIN HYDROCHLORIDE 500 MG/1
500 TABLET, EXTENDED RELEASE ORAL 2 TIMES DAILY
Qty: 180 TABLET | Refills: 0 | Status: SHIPPED | OUTPATIENT
Start: 2023-08-29 | End: 2023-10-10 | Stop reason: SDUPTHER

## 2023-08-29 RX ORDER — PRAVASTATIN SODIUM 40 MG/1
20 TABLET ORAL DAILY
Qty: 45 TABLET | Refills: 0 | Status: SHIPPED | OUTPATIENT
Start: 2023-08-29 | End: 2023-10-10 | Stop reason: SDUPTHER

## 2023-08-29 RX ORDER — DICLOFENAC SODIUM 30 MG/G
1 GEL TOPICAL 2 TIMES DAILY
COMMUNITY
Start: 2023-07-17

## 2023-08-29 RX ORDER — BUSPIRONE HYDROCHLORIDE 10 MG/1
10 TABLET ORAL 3 TIMES DAILY
Qty: 90 TABLET | Refills: 2 | Status: SHIPPED | OUTPATIENT
Start: 2023-08-29 | End: 2023-11-06 | Stop reason: SDUPTHER

## 2023-08-29 RX ORDER — CYCLOBENZAPRINE HCL 10 MG
10 TABLET ORAL NIGHTLY
COMMUNITY
Start: 2023-07-17 | End: 2023-10-10

## 2023-08-29 RX ORDER — LISINOPRIL AND HYDROCHLOROTHIAZIDE 12.5; 2 MG/1; MG/1
1 TABLET ORAL DAILY
Qty: 90 TABLET | Refills: 0 | Status: SHIPPED | OUTPATIENT
Start: 2023-08-29 | End: 2023-10-10 | Stop reason: SDUPTHER

## 2023-08-29 NOTE — PROGRESS NOTES
Patient ID: [id number]     Chief Complaint: Establish Care        HPI:      Disclaimer:  This note is prepared using voice recognition software and as such is likely to have errors despite attempts at proofreading. Please contact me for questions.     Rosetta Loving is a 67 y.o. female here today for the following    Acute Issues-    Chronic Issues-  Diabetes mellitus type 2  Hemoglobin A1c   Date Value Ref Range Status   06/04/2021 6.2 <<=7.0 % Final   01/14/2021 6.1 <<=7.0 % Final   12/08/2020 6.4 <<=7.0 % Final   Currently on metformin   Protective Sensation (w/ 10 gram monofilament):  Right: Intact  Left: Intact    Visual Inspection:  Normal -  Bilateral    Pedal Pulses:   Right: Present  Left: Present    Posterior Tibialis Pulses:   Right:Present  Left: Present      Hypertension   At goal   Currently asymptomatic   Currently on lisinopril/hydrochlorothiazide    Obesity   Body mass index is 38.8 kg/m².    Hyperlipidemia  Currently on pravastatin  Denies of any leg cramps     Depression/anxiety   Currently on BuSpar and Lexapro   PHQ-9 score today is for   Denies suicidal homicidal symptoms  Past Surgical History:   Procedure Laterality Date    CHOLECYSTECTOMY  1994    Yes    COLONOSCOPY  1954    Dr. Elie Pickett    GALLBLADDER SURGERY      HIP SURGERY      HYSTERECTOMY      KNEE SURGERY      TONSILLECTOMY         Review of patient's allergies indicates:  No Known Allergies    Current Outpatient Medications   Medication Instructions    busPIRone (BUSPAR) 10 mg, Oral, 3 times daily    EScitalopram oxalate (LEXAPRO) 20 mg, Oral, Daily    lisinopriL-hydrochlorothiazide (PRINZIDE,ZESTORETIC) 20-12.5 mg per tablet 1 tablet, Oral, Daily    metFORMIN (GLUCOPHAGE-XR) 500 mg, Oral, 2 times daily    pravastatin (PRAVACHOL) 20 mg, Oral, Daily       Social History     Socioeconomic History    Marital status: Unknown   Tobacco Use    Smoking status: Never     Passive exposure: Never    Smokeless tobacco:  "Never    Tobacco comments:     Never   Substance and Sexual Activity    Alcohol use: Yes     Alcohol/week: 1.0 standard drink of alcohol     Types: 1 Glasses of wine per week     Comment: Social    Drug use: Never    Sexual activity: Not Currently     Partners: Male     Birth control/protection: Abstinence     Comment: Abstinence        Family History   Problem Relation Age of Onset    Arthritis Mother         Yes    Cancer Mother         Breast    Depression Mother         Yes    Hypertension Mother         Yes    Mental illness Mother         Yes    Diabetes Father         Yes        Patient Care Team:  No, Primary Doctor as PCP - General     Subjective:     ROS    See HPI for details    Constitutional: Denies Change in appetite. Denies Chills. Denies Fever. Denies Night sweats.  Respiratory: Denies Cough. Denies Shortness of breath. Denies Shortness of breath with exertion. Denies Wheezing.  Cardiovascular: DeniesChest pain at rest. Denies Chest pain with exertion. Denies Irregular heartbeat. Denies Palpitations. Denies Edema.  Gastrointestinal: Denies Abdominal pain. DeniesDiarrhea. Denies Nausea. Denies Vomiting. Denies Hematemesis or Hematochezia.  Genitourinary: Denies Dysuria. Denies Urinary frequency. Denies Urinary urgency. Denies Blood in urine.  Endocrine: Denies Cold intolerance. Denies Excessive thirst. Denies Heat intolerance. Denies Weight loss. Denies Weight gain.  Musculoskeletal: Denies Painful joints. Denies Weakness.  Integumentary: Denies Rash. Denies Itching. Denies Dry skin.  Neurologic: Denies Dizziness. Denies Fainting. Denies Headache.  Psychiatric: Denies Depression. Denies Anxiety. Denies Suicidal/Homicidal ideations.    All Other ROS: Negative except as stated in HPI.  Objective:     Visit Vitals  /64 (BP Location: Right arm, Patient Position: Sitting, BP Method: Large (Manual))   Temp 98.3 °F (36.8 °C) (Oral)   Resp 17   Ht 5' 6" (1.676 m)   Wt 109 kg (240 lb 6.4 oz)   SpO2 95% "   BMI 38.80 kg/m²       Physical Exam    General: Alert and oriented, No acute distress.  Respiratory: Clear to auscultation bilaterally; No wheezes, rales or rhonchi,Non-labored respirations, Symmetrical chest wall expansion.  Cardiovascular: Regular rate and rhythm, S1/S2 normal, No murmurs, rubs or gallops.  Gastrointestinal: Soft, Non-tender, Non-distended, Normal bowel sounds, No palpable organomegaly.  Musculoskeletal: Normal range of motion.  Extremities: No clubbing, cyanosis or edema  Neurologic: No focal deficits  Psychiatric: Normal interaction, Coherent speech, Euthymic mood, Appropriate affect   Assessment:       ICD-10-CM ICD-9-CM   1. Type 2 diabetes mellitus without complication, without long-term current use of insulin  E11.9 250.00   2. Severe obesity (BMI 35.0-39.9) with comorbidity  E66.01 278.01   3. Depression, recurrent  F33.9 296.30   4. Breast cancer screening by mammogram  Z12.31 V76.12   5. Mixed hyperlipidemia  E78.2 272.2   6. Hypercholesteremia  E78.00 272.0   7. Hypertension, unspecified type  I10 401.9   8. Anxiety and depression  F41.9 300.00    F32.A 311        Plan:     1. Type 2 diabetes mellitus without complication, without long-term current use of insulin  -     CBC Auto Differential; Future; Expected date: 08/29/2023  -     Comprehensive Metabolic Panel; Future; Expected date: 08/29/2023  -     TSH; Future; Expected date: 08/29/2023  -     Hemoglobin A1C; Future; Expected date: 08/29/2023  -     Urinalysis; Future; Expected date: 08/29/2023  -     Microalbumin/Creatinine Ratio, Urine; Future; Expected date: 08/29/2023  -     semaglutide (OZEMPIC) 0.25 mg or 0.5 mg (2 mg/3 mL) pen injector; Inject 0.25 mg into the skin every 7 days.  Dispense: 3 mL; Refill: 0  -     metFORMIN (GLUCOPHAGE-XR) 500 MG ER 24hr tablet; Take 1 tablet (500 mg total) by mouth 2 (two) times daily.  Dispense: 180 tablet; Refill: 0  Recommend to continue metformin   Start Ozempic as prescribed   Recommend  to start 35 minutes of brisk walking with diabetic diet    2. Severe obesity (BMI 35.0-39.9) with comorbidity  Body mass index is 38.8 kg/m².  Goal BMI <30.  Exercise 5 times a week for 30 minutes per day.  Avoid soda, simple sugars, excessive rice, potatoes or bread. Limit fast foods and fried foods.  Choose complex carbs in moderation (example: green vegetables, beans, oatmeal). Eat plenty of fresh fruits and vegetables with lean meats daily.  Do not skip meals. Eat a balanced portion size.  Avoid fad diets. Consider permanent healthy life style changes.     3. Depression, recurrent with anxiety   PHQ-9 score today is 4  Denies of any suicidal/homicidal symptoms   Continue BuSpar and Lexapro as prescribed  -     EScitalopram oxalate (LEXAPRO) 20 MG tablet; Take 1 tablet (20 mg total) by mouth once daily.  Dispense: 30 tablet; Refill: 2  -     busPIRone (BUSPAR) 10 MG tablet; Take 1 tablet (10 mg total) by mouth 3 (three) times daily.  Dispense: 90 tablet; Refill: 2    4. Breast cancer screening by mammogram  -     Mammo Digital Screening Bilat w/ Janusz; Future; Expected date: 08/29/2023    5. Mixed hyperlipidemia  Continue statins as prescribed with Mediterranean diet  -     Lipid Panel; Future; Expected date: 08/29/2023  -     pravastatin (PRAVACHOL) 40 MG tablet; Take 0.5 tablets (20 mg total) by mouth once daily.  Dispense: 45 tablet; Refill: 0    6. Hypertension, unspecified type  Blood pressure at goal  Continue 35 minutes of brisk walking with dash diet along with Rx as prescribed  -     lisinopriL-hydrochlorothiazide (PRINZIDE,ZESTORETIC) 20-12.5 mg per tablet; Take 1 tablet by mouth once daily.  Dispense: 90 tablet; Refill: 0    7. Need for Strep Vaccine   Given in clinic           Medication List with Changes/Refills   Current Medications    BUSPIRONE (BUSPAR) 10 MG TABLET    Take 1 tablet (10 mg total) by mouth 3 (three) times daily.       Start Date: 11/16/2022End Date: 2/14/2023    ESCITALOPRAM OXALATE  (LEXAPRO) 20 MG TABLET    Take 1 tablet (20 mg total) by mouth once daily.       Start Date: 11/16/2022End Date: 2/14/2023    LISINOPRIL-HYDROCHLOROTHIAZIDE (PRINZIDE,ZESTORETIC) 20-12.5 MG PER TABLET    Take 1 tablet by mouth once daily.       Start Date: 11/16/2022End Date: 2/14/2023    METFORMIN (GLUCOPHAGE-XR) 500 MG ER 24HR TABLET    Take 1 tablet (500 mg total) by mouth 2 (two) times daily.       Start Date: 11/16/2022End Date: 2/14/2023    PRAVASTATIN (PRAVACHOL) 40 MG TABLET    Take 0.5 tablets (20 mg total) by mouth once daily.       Start Date: 11/16/2022End Date: 2/14/2023          Follow up in about 4 weeks (around 9/26/2023) for Annual Wellness and Ozempic folow up .   In addition to their scheduled follow up, the patient has also been instructed to follow up on as needed basis.

## 2023-08-30 RX ORDER — SEMAGLUTIDE 0.68 MG/ML
0.25 INJECTION, SOLUTION SUBCUTANEOUS
Qty: 3 ML | Refills: 0 | Status: SHIPPED | OUTPATIENT
Start: 2023-08-30 | End: 2023-11-20 | Stop reason: SDUPTHER

## 2023-08-30 RX ORDER — SEMAGLUTIDE 0.68 MG/ML
0.25 INJECTION, SOLUTION SUBCUTANEOUS
Qty: 3 ML | Refills: 0 | OUTPATIENT
Start: 2023-08-30

## 2023-08-30 NOTE — TELEPHONE ENCOUNTER
----- Message from Lacey Flor sent at 8/30/2023 10:03 AM CDT -----  .Type:  Needs Medical Advice    Who Called: pt     Pharmacy name and phone #:  [facility] - [place], [place] - [address]  Would the patient rather a call back or a response via [facility]? Call back   Best Call Back Number: [phone]  Additional Information: pt needs her semaglutide (OZEMPIC) 0.25 mg or 0.5 mg (2 mg/3 mL) pen injector sent to [facility] - [facility] [address], [facility] is to expensive

## 2023-08-30 NOTE — PROGRESS NOTES
Rosetta Loving is a 67 y.o. female here for a diabetic eye screening with non-dilated fundus photos per PCP.    Patient cooperative?: Yes  Small pupils?: No  Last eye exam: N/A    For exam results, see Encounter Report.

## 2023-09-08 LAB
LEFT EYE DM RETINOPATHY: NEGATIVE
RIGHT EYE DM RETINOPATHY: NEGATIVE

## 2023-09-11 ENCOUNTER — PATIENT MESSAGE (OUTPATIENT)
Dept: FAMILY MEDICINE | Facility: CLINIC | Age: 68
End: 2023-09-11
Payer: MEDICARE

## 2023-09-25 ENCOUNTER — LAB VISIT (OUTPATIENT)
Dept: LAB | Facility: HOSPITAL | Age: 68
End: 2023-09-25
Attending: STUDENT IN AN ORGANIZED HEALTH CARE EDUCATION/TRAINING PROGRAM
Payer: MEDICARE

## 2023-09-25 DIAGNOSIS — E11.9 TYPE 2 DIABETES MELLITUS WITHOUT COMPLICATION, WITHOUT LONG-TERM CURRENT USE OF INSULIN: ICD-10-CM

## 2023-09-25 DIAGNOSIS — E78.2 MIXED HYPERLIPIDEMIA: ICD-10-CM

## 2023-09-25 LAB
ALBUMIN SERPL-MCNC: 3.8 G/DL (ref 3.4–4.8)
ALBUMIN/GLOB SERPL: 1.3 RATIO (ref 1.1–2)
ALP SERPL-CCNC: 61 UNIT/L (ref 40–150)
ALT SERPL-CCNC: 22 UNIT/L (ref 0–55)
APPEARANCE UR: CLEAR
AST SERPL-CCNC: 25 UNIT/L (ref 5–34)
BACTERIA #/AREA URNS AUTO: NORMAL /HPF
BASOPHILS # BLD AUTO: 0.08 X10(3)/MCL
BASOPHILS NFR BLD AUTO: 1.1 %
BILIRUB SERPL-MCNC: 0.8 MG/DL
BILIRUB UR QL STRIP.AUTO: NEGATIVE
BUN SERPL-MCNC: 15.6 MG/DL (ref 9.8–20.1)
CALCIUM SERPL-MCNC: 9.5 MG/DL (ref 8.4–10.2)
CHLORIDE SERPL-SCNC: 103 MMOL/L (ref 98–107)
CHOLEST SERPL-MCNC: 157 MG/DL
CHOLEST/HDLC SERPL: 3 {RATIO} (ref 0–5)
CO2 SERPL-SCNC: 27 MMOL/L (ref 23–31)
COLOR UR AUTO: YELLOW
CREAT SERPL-MCNC: 1 MG/DL (ref 0.55–1.02)
CREAT UR-MCNC: 133.1 MG/DL (ref 45–106)
EOSINOPHIL # BLD AUTO: 0.22 X10(3)/MCL (ref 0–0.9)
EOSINOPHIL NFR BLD AUTO: 3.1 %
ERYTHROCYTE [DISTWIDTH] IN BLOOD BY AUTOMATED COUNT: 12.4 % (ref 11.5–17)
EST. AVERAGE GLUCOSE BLD GHB EST-MCNC: 145.6 MG/DL
GFR SERPLBLD CREATININE-BSD FMLA CKD-EPI: >60 MLS/MIN/1.73/M2
GLOBULIN SER-MCNC: 3 GM/DL (ref 2.4–3.5)
GLUCOSE SERPL-MCNC: 124 MG/DL (ref 82–115)
GLUCOSE UR QL STRIP.AUTO: NEGATIVE
HBA1C MFR BLD: 6.7 %
HCT VFR BLD AUTO: 40.4 % (ref 37–47)
HDLC SERPL-MCNC: 48 MG/DL (ref 35–60)
HGB BLD-MCNC: 13.5 G/DL (ref 12–16)
IMM GRANULOCYTES # BLD AUTO: 0.02 X10(3)/MCL (ref 0–0.04)
IMM GRANULOCYTES NFR BLD AUTO: 0.3 %
KETONES UR QL STRIP.AUTO: NEGATIVE
LDLC SERPL CALC-MCNC: 86 MG/DL (ref 50–140)
LEUKOCYTE ESTERASE UR QL STRIP.AUTO: ABNORMAL
LYMPHOCYTES # BLD AUTO: 2.19 X10(3)/MCL (ref 0.6–4.6)
LYMPHOCYTES NFR BLD AUTO: 30.8 %
MCH RBC QN AUTO: 31 PG (ref 27–31)
MCHC RBC AUTO-ENTMCNC: 33.4 G/DL (ref 33–36)
MCV RBC AUTO: 92.7 FL (ref 80–94)
MICROALBUMIN UR-MCNC: 5.8 UG/ML
MICROALBUMIN/CREAT RATIO PNL UR: 4.4 MG/GM CR (ref 0–30)
MONOCYTES # BLD AUTO: 0.62 X10(3)/MCL (ref 0.1–1.3)
MONOCYTES NFR BLD AUTO: 8.7 %
NEUTROPHILS # BLD AUTO: 3.97 X10(3)/MCL (ref 2.1–9.2)
NEUTROPHILS NFR BLD AUTO: 56 %
NITRITE UR QL STRIP.AUTO: NEGATIVE
NRBC BLD AUTO-RTO: 0 %
PH UR STRIP.AUTO: 7 [PH]
PLATELET # BLD AUTO: 248 X10(3)/MCL (ref 130–400)
PMV BLD AUTO: 10 FL (ref 7.4–10.4)
POTASSIUM SERPL-SCNC: 4.8 MMOL/L (ref 3.5–5.1)
PROT SERPL-MCNC: 6.8 GM/DL (ref 5.8–7.6)
PROT UR QL STRIP.AUTO: NEGATIVE
RBC # BLD AUTO: 4.36 X10(6)/MCL (ref 4.2–5.4)
RBC #/AREA URNS AUTO: <5 /HPF
RBC UR QL AUTO: NEGATIVE
SODIUM SERPL-SCNC: 141 MMOL/L (ref 136–145)
SP GR UR STRIP.AUTO: 1.01 (ref 1–1.03)
SQUAMOUS #/AREA URNS AUTO: <5 /HPF
TRIGL SERPL-MCNC: 113 MG/DL (ref 37–140)
TSH SERPL-ACNC: 1.72 UIU/ML (ref 0.35–4.94)
UROBILINOGEN UR STRIP-ACNC: 1
VLDLC SERPL CALC-MCNC: 23 MG/DL
WBC # SPEC AUTO: 7.1 X10(3)/MCL (ref 4.5–11.5)
WBC #/AREA URNS AUTO: 5 /HPF

## 2023-09-25 PROCEDURE — 81001 URINALYSIS AUTO W/SCOPE: CPT

## 2023-09-25 PROCEDURE — 84443 ASSAY THYROID STIM HORMONE: CPT

## 2023-09-25 PROCEDURE — 36415 COLL VENOUS BLD VENIPUNCTURE: CPT

## 2023-09-25 PROCEDURE — 83036 HEMOGLOBIN GLYCOSYLATED A1C: CPT

## 2023-09-25 PROCEDURE — 85025 COMPLETE CBC W/AUTO DIFF WBC: CPT

## 2023-09-25 PROCEDURE — 82043 UR ALBUMIN QUANTITATIVE: CPT

## 2023-09-25 PROCEDURE — 80053 COMPREHEN METABOLIC PANEL: CPT

## 2023-09-25 PROCEDURE — 80061 LIPID PANEL: CPT

## 2023-10-10 ENCOUNTER — TELEPHONE (OUTPATIENT)
Dept: FAMILY MEDICINE | Facility: CLINIC | Age: 68
End: 2023-10-10

## 2023-10-10 ENCOUNTER — LAB VISIT (OUTPATIENT)
Dept: LAB | Facility: HOSPITAL | Age: 68
End: 2023-10-10
Attending: STUDENT IN AN ORGANIZED HEALTH CARE EDUCATION/TRAINING PROGRAM
Payer: MEDICARE

## 2023-10-10 ENCOUNTER — OFFICE VISIT (OUTPATIENT)
Dept: FAMILY MEDICINE | Facility: CLINIC | Age: 68
End: 2023-10-10
Payer: MEDICARE

## 2023-10-10 VITALS
DIASTOLIC BLOOD PRESSURE: 90 MMHG | HEART RATE: 93 BPM | BODY MASS INDEX: 37.33 KG/M2 | TEMPERATURE: 98 F | SYSTOLIC BLOOD PRESSURE: 142 MMHG | RESPIRATION RATE: 17 BRPM | HEIGHT: 66 IN | OXYGEN SATURATION: 98 % | WEIGHT: 232.31 LBS

## 2023-10-10 DIAGNOSIS — Z87.81 S/P ORIF (OPEN REDUCTION INTERNAL FIXATION) FRACTURE: ICD-10-CM

## 2023-10-10 DIAGNOSIS — I10 BENIGN ESSENTIAL HTN: ICD-10-CM

## 2023-10-10 DIAGNOSIS — Z23 NEEDS FLU SHOT: ICD-10-CM

## 2023-10-10 DIAGNOSIS — F33.9 DEPRESSION, RECURRENT: ICD-10-CM

## 2023-10-10 DIAGNOSIS — D50.0 BLOOD LOSS ANEMIA: ICD-10-CM

## 2023-10-10 DIAGNOSIS — Z98.890 S/P ORIF (OPEN REDUCTION INTERNAL FIXATION) FRACTURE: ICD-10-CM

## 2023-10-10 DIAGNOSIS — E11.9 TYPE 2 DIABETES MELLITUS WITHOUT COMPLICATION, WITHOUT LONG-TERM CURRENT USE OF INSULIN: ICD-10-CM

## 2023-10-10 DIAGNOSIS — E66.01 CLASS 2 SEVERE OBESITY DUE TO EXCESS CALORIES WITH SERIOUS COMORBIDITY AND BODY MASS INDEX (BMI) OF 37.0 TO 37.9 IN ADULT: ICD-10-CM

## 2023-10-10 DIAGNOSIS — E78.2 MIXED HYPERLIPIDEMIA: ICD-10-CM

## 2023-10-10 DIAGNOSIS — Z76.89 ENCOUNTER FOR SUPPORT AND COORDINATION OF TRANSITION OF CARE: ICD-10-CM

## 2023-10-10 LAB
BASOPHILS # BLD AUTO: 0.08 X10(3)/MCL
BASOPHILS NFR BLD AUTO: 0.9 %
EOSINOPHIL # BLD AUTO: 0.4 X10(3)/MCL (ref 0–0.9)
EOSINOPHIL NFR BLD AUTO: 4.3 %
ERYTHROCYTE [DISTWIDTH] IN BLOOD BY AUTOMATED COUNT: 14.3 % (ref 11.5–17)
HCT VFR BLD AUTO: 30 % (ref 37–47)
HGB BLD-MCNC: 9.5 G/DL (ref 12–16)
IMM GRANULOCYTES # BLD AUTO: 0.06 X10(3)/MCL (ref 0–0.04)
IMM GRANULOCYTES NFR BLD AUTO: 0.6 %
LYMPHOCYTES # BLD AUTO: 1.88 X10(3)/MCL (ref 0.6–4.6)
LYMPHOCYTES NFR BLD AUTO: 20.1 %
MCH RBC QN AUTO: 30.4 PG (ref 27–31)
MCHC RBC AUTO-ENTMCNC: 31.7 G/DL (ref 33–36)
MCV RBC AUTO: 96.2 FL (ref 80–94)
MONOCYTES # BLD AUTO: 0.7 X10(3)/MCL (ref 0.1–1.3)
MONOCYTES NFR BLD AUTO: 7.5 %
NEUTROPHILS # BLD AUTO: 6.23 X10(3)/MCL (ref 2.1–9.2)
NEUTROPHILS NFR BLD AUTO: 66.6 %
NRBC BLD AUTO-RTO: 0 %
PLATELET # BLD AUTO: 497 X10(3)/MCL (ref 130–400)
PMV BLD AUTO: 9.2 FL (ref 7.4–10.4)
RBC # BLD AUTO: 3.12 X10(6)/MCL (ref 4.2–5.4)
WBC # SPEC AUTO: 9.35 X10(3)/MCL (ref 4.5–11.5)

## 2023-10-10 PROCEDURE — 3288F PR FALLS RISK ASSESSMENT DOCUMENTED: ICD-10-PCS | Mod: CPTII,,, | Performed by: STUDENT IN AN ORGANIZED HEALTH CARE EDUCATION/TRAINING PROGRAM

## 2023-10-10 PROCEDURE — 3288F FALL RISK ASSESSMENT DOCD: CPT | Mod: CPTII,,, | Performed by: STUDENT IN AN ORGANIZED HEALTH CARE EDUCATION/TRAINING PROGRAM

## 2023-10-10 PROCEDURE — 1159F MED LIST DOCD IN RCRD: CPT | Mod: CPTII,,, | Performed by: STUDENT IN AN ORGANIZED HEALTH CARE EDUCATION/TRAINING PROGRAM

## 2023-10-10 PROCEDURE — 90694 VACC AIIV4 NO PRSRV 0.5ML IM: CPT | Mod: ,,, | Performed by: STUDENT IN AN ORGANIZED HEALTH CARE EDUCATION/TRAINING PROGRAM

## 2023-10-10 PROCEDURE — 85025 COMPLETE CBC W/AUTO DIFF WBC: CPT

## 2023-10-10 PROCEDURE — 1125F PR PAIN SEVERITY QUANTIFIED, PAIN PRESENT: ICD-10-PCS | Mod: CPTII,,, | Performed by: STUDENT IN AN ORGANIZED HEALTH CARE EDUCATION/TRAINING PROGRAM

## 2023-10-10 PROCEDURE — 36415 COLL VENOUS BLD VENIPUNCTURE: CPT

## 2023-10-10 PROCEDURE — 4010F PR ACE/ARB THEARPY RXD/TAKEN: ICD-10-PCS | Mod: CPTII,,, | Performed by: STUDENT IN AN ORGANIZED HEALTH CARE EDUCATION/TRAINING PROGRAM

## 2023-10-10 PROCEDURE — 3077F SYST BP >= 140 MM HG: CPT | Mod: CPTII,,, | Performed by: STUDENT IN AN ORGANIZED HEALTH CARE EDUCATION/TRAINING PROGRAM

## 2023-10-10 PROCEDURE — 3044F PR MOST RECENT HEMOGLOBIN A1C LEVEL <7.0%: ICD-10-PCS | Mod: CPTII,,, | Performed by: STUDENT IN AN ORGANIZED HEALTH CARE EDUCATION/TRAINING PROGRAM

## 2023-10-10 PROCEDURE — 1125F AMNT PAIN NOTED PAIN PRSNT: CPT | Mod: CPTII,,, | Performed by: STUDENT IN AN ORGANIZED HEALTH CARE EDUCATION/TRAINING PROGRAM

## 2023-10-10 PROCEDURE — G0008 ADMIN INFLUENZA VIRUS VAC: HCPCS | Mod: ,,, | Performed by: STUDENT IN AN ORGANIZED HEALTH CARE EDUCATION/TRAINING PROGRAM

## 2023-10-10 PROCEDURE — 3080F DIAST BP >= 90 MM HG: CPT | Mod: CPTII,,, | Performed by: STUDENT IN AN ORGANIZED HEALTH CARE EDUCATION/TRAINING PROGRAM

## 2023-10-10 PROCEDURE — 3066F NEPHROPATHY DOC TX: CPT | Mod: CPTII,,, | Performed by: STUDENT IN AN ORGANIZED HEALTH CARE EDUCATION/TRAINING PROGRAM

## 2023-10-10 PROCEDURE — 3044F HG A1C LEVEL LT 7.0%: CPT | Mod: CPTII,,, | Performed by: STUDENT IN AN ORGANIZED HEALTH CARE EDUCATION/TRAINING PROGRAM

## 2023-10-10 PROCEDURE — 1101F PR PT FALLS ASSESS DOC 0-1 FALLS W/OUT INJ PAST YR: ICD-10-PCS | Mod: CPTII,,, | Performed by: STUDENT IN AN ORGANIZED HEALTH CARE EDUCATION/TRAINING PROGRAM

## 2023-10-10 PROCEDURE — 4010F ACE/ARB THERAPY RXD/TAKEN: CPT | Mod: CPTII,,, | Performed by: STUDENT IN AN ORGANIZED HEALTH CARE EDUCATION/TRAINING PROGRAM

## 2023-10-10 PROCEDURE — 3061F PR NEG MICROALBUMINURIA RESULT DOCUMENTED/REVIEW: ICD-10-PCS | Mod: CPTII,,, | Performed by: STUDENT IN AN ORGANIZED HEALTH CARE EDUCATION/TRAINING PROGRAM

## 2023-10-10 PROCEDURE — 1159F PR MEDICATION LIST DOCUMENTED IN MEDICAL RECORD: ICD-10-PCS | Mod: CPTII,,, | Performed by: STUDENT IN AN ORGANIZED HEALTH CARE EDUCATION/TRAINING PROGRAM

## 2023-10-10 PROCEDURE — 90694 FLU VACCINE - QUADRIVALENT - ADJUVANTED: ICD-10-PCS | Mod: ,,, | Performed by: STUDENT IN AN ORGANIZED HEALTH CARE EDUCATION/TRAINING PROGRAM

## 2023-10-10 PROCEDURE — 1101F PT FALLS ASSESS-DOCD LE1/YR: CPT | Mod: CPTII,,, | Performed by: STUDENT IN AN ORGANIZED HEALTH CARE EDUCATION/TRAINING PROGRAM

## 2023-10-10 PROCEDURE — 3080F PR MOST RECENT DIASTOLIC BLOOD PRESSURE >= 90 MM HG: ICD-10-PCS | Mod: CPTII,,, | Performed by: STUDENT IN AN ORGANIZED HEALTH CARE EDUCATION/TRAINING PROGRAM

## 2023-10-10 PROCEDURE — 99214 PR OFFICE/OUTPT VISIT, EST, LEVL IV, 30-39 MIN: ICD-10-PCS | Mod: ,,, | Performed by: STUDENT IN AN ORGANIZED HEALTH CARE EDUCATION/TRAINING PROGRAM

## 2023-10-10 PROCEDURE — 3077F PR MOST RECENT SYSTOLIC BLOOD PRESSURE >= 140 MM HG: ICD-10-PCS | Mod: CPTII,,, | Performed by: STUDENT IN AN ORGANIZED HEALTH CARE EDUCATION/TRAINING PROGRAM

## 2023-10-10 PROCEDURE — 1160F PR REVIEW ALL MEDS BY PRESCRIBER/CLIN PHARMACIST DOCUMENTED: ICD-10-PCS | Mod: CPTII,,, | Performed by: STUDENT IN AN ORGANIZED HEALTH CARE EDUCATION/TRAINING PROGRAM

## 2023-10-10 PROCEDURE — G0008 FLU VACCINE - QUADRIVALENT - ADJUVANTED: ICD-10-PCS | Mod: ,,, | Performed by: STUDENT IN AN ORGANIZED HEALTH CARE EDUCATION/TRAINING PROGRAM

## 2023-10-10 PROCEDURE — 1160F RVW MEDS BY RX/DR IN RCRD: CPT | Mod: CPTII,,, | Performed by: STUDENT IN AN ORGANIZED HEALTH CARE EDUCATION/TRAINING PROGRAM

## 2023-10-10 PROCEDURE — 3061F NEG MICROALBUMINURIA REV: CPT | Mod: CPTII,,, | Performed by: STUDENT IN AN ORGANIZED HEALTH CARE EDUCATION/TRAINING PROGRAM

## 2023-10-10 PROCEDURE — 99214 OFFICE O/P EST MOD 30 MIN: CPT | Mod: ,,, | Performed by: STUDENT IN AN ORGANIZED HEALTH CARE EDUCATION/TRAINING PROGRAM

## 2023-10-10 PROCEDURE — 3066F PR DOCUMENTATION OF TREATMENT FOR NEPHROPATHY: ICD-10-PCS | Mod: CPTII,,, | Performed by: STUDENT IN AN ORGANIZED HEALTH CARE EDUCATION/TRAINING PROGRAM

## 2023-10-10 RX ORDER — ESCITALOPRAM OXALATE 20 MG/1
20 TABLET ORAL DAILY
Qty: 30 TABLET | Refills: 2 | Status: SHIPPED | OUTPATIENT
Start: 2023-10-10 | End: 2023-11-01

## 2023-10-10 RX ORDER — LISINOPRIL AND HYDROCHLOROTHIAZIDE 12.5; 2 MG/1; MG/1
1 TABLET ORAL DAILY
Qty: 90 TABLET | Refills: 0 | Status: SHIPPED | OUTPATIENT
Start: 2023-10-10 | End: 2023-12-04 | Stop reason: SDUPTHER

## 2023-10-10 RX ORDER — PRAVASTATIN SODIUM 20 MG/1
20 TABLET ORAL DAILY
Qty: 90 TABLET | Refills: 0 | Status: SHIPPED | OUTPATIENT
Start: 2023-10-10 | End: 2023-11-20

## 2023-10-10 RX ORDER — METFORMIN HYDROCHLORIDE 500 MG/1
500 TABLET, EXTENDED RELEASE ORAL 2 TIMES DAILY
Qty: 180 TABLET | Refills: 0 | Status: SHIPPED | OUTPATIENT
Start: 2023-10-10 | End: 2023-11-21 | Stop reason: SDUPTHER

## 2023-10-10 NOTE — PROGRESS NOTES
Patient ID: [id number]     Chief Complaint: Follow-up (Hospital)    HPI:      Disclaimer:  This note is prepared using voice recognition software and as such is likely to have errors despite attempts at proofreading. Please contact me for questions.     Rosetta Loving is a 67 y.o. female here today for the following      Acute Issues-  Hospital follow-up for open reduction internal fixation of left humerus.   Patient is status post ORIF on 10/02/2023 by Dr. Velazquez after tripping on concrete.  Patient reports as of today she is feeling completely fine and denies of any new concerns.  Her pain level today is 1/10.  Takes Tylenol p.r.n. for pain.  Denies of any shortness a breath, chest pain, tingling and numbness sensation in the left arm, fever, fatigue, dizziness nausea or vomiting.  Her last H&H was 9.4/28.4.  (Her baseline 13.5/40.4).  Currently asymptomatic     Hypertension  Patient reports of being inconsistent in taking her medication.  Currently out of her medication.  Currently asymptomatic       Depression  Inconsistent with the medication   Currently on Lexapro 20 mg   PHQ-9 score today is 10   Denies suicidal/homicidal symptoms     Obesity  Body mass index is 37.49 kg/m².  Currently on Ozempic  Not taking consistently       Chronic Issues-    ----------------------------  Diabetes mellitus, type 2  Hypertension     Past Surgical History:   Procedure Laterality Date    CHOLECYSTECTOMY  1994    Yes    COLONOSCOPY  1954    Dr. Elie Pickett    GALLBLADDER SURGERY      HIP SURGERY      HYSTERECTOMY      KNEE SURGERY      TONSILLECTOMY         Review of patient's allergies indicates:  No Known Allergies    Current Outpatient Medications   Medication Instructions    busPIRone (BUSPAR) 10 mg, Oral, 3 times daily    cyclobenzaprine (FLEXERIL) 10 mg, Oral, Nightly, Takes half the tablet.    diclofenac sodium (SOLARAZE) 3 % gel 1 application , Topical (Top), 2 times daily    EScitalopram oxalate  (LEXAPRO) 20 mg, Oral, Daily    lisinopriL-hydrochlorothiazide (PRINZIDE,ZESTORETIC) 20-12.5 mg per tablet 1 tablet, Oral, Daily    metFORMIN (GLUCOPHAGE-XR) 500 mg, Oral, 2 times daily    OZEMPIC 0.25 mg, Subcutaneous, Every 7 days    pravastatin (PRAVACHOL) 20 mg, Oral, Daily       Social History     Socioeconomic History    Marital status:    Tobacco Use    Smoking status: Never     Passive exposure: Never    Smokeless tobacco: Never    Tobacco comments:     Never   Substance and Sexual Activity    Alcohol use: Yes     Alcohol/week: 1.0 standard drink of alcohol     Types: 1 Glasses of wine per week     Comment: Social    Drug use: Never    Sexual activity: Not Currently     Partners: Male     Birth control/protection: Abstinence     Comment: Abstinence        Family History   Problem Relation Age of Onset    Arthritis Mother         Yes    Cancer Mother         Breast    Depression Mother         Yes    Hypertension Mother         Yes    Mental illness Mother         Yes    Diabetes Father         Yes        Patient Care Team:  Sonya Vela MD as PCP - General (Family Medicine)     Subjective:     ROS    See HPI for details    Constitutional: Denies Change in appetite. Denies Chills. Denies Fever. Denies Night sweats.  Respiratory: Denies Cough. Denies Shortness of breath. Denies Shortness of breath with exertion. Denies Wheezing.  Cardiovascular: DeniesChest pain at rest. Denies Chest pain with exertion. Denies Irregular heartbeat. Denies Palpitations. Denies Edema.  Gastrointestinal: Denies Abdominal pain. DeniesDiarrhea. Denies Nausea. Denies Vomiting. Denies Hematemesis or Hematochezia.  Genitourinary: Denies Dysuria. Denies Urinary frequency. Denies Urinary urgency. Denies Blood in urine.  Endocrine: Denies Cold intolerance. Denies Excessive thirst. Denies Heat intolerance. Denies Weight loss. Denies Weight gain.  Musculoskeletal: Denies Painful joints. Denies Weakness.  Integumentary: Denies  "Rash. Denies Itching. Denies Dry skin.  Neurologic: Denies Dizziness. Denies Fainting. Denies Headache.  Psychiatric: Denies Depression. Denies Anxiety. Denies Suicidal/Homicidal ideations.    All Other ROS: Negative except as stated in HPI.  Objective:     Visit Vitals  BP (!) 142/90 (BP Location: Right arm, Patient Position: Sitting, BP Method: Large (Manual))   Pulse 93   Temp 98.2 °F (36.8 °C) (Oral)   Resp 17   Ht 5' 6" (1.676 m)   Wt 105.4 kg (232 lb 4.8 oz)   SpO2 98%   BMI 37.49 kg/m²       Physical Exam    General: Alert and oriented, obese, No acute distress.  Respiratory: Clear to auscultation bilaterally; No wheezes, rales or rhonchi,Non-labored respirations, Symmetrical chest wall expansion.  Cardiovascular: Regular rate and rhythm, S1/S2 normal, No murmurs, rubs or gallops.  Gastrointestinal: Soft, Non-tender, Non-distended, Normal bowel sounds, No palpable organomegaly.  Musculoskeletal:   Left upper extremity-  Inspection-has a sling, mild swelling, discoloration as compared to the right arm  Palpation-mildly tender  Range of motion restricted   Extremities: No clubbing, cyanosis or edema  Neurologic: No focal deficits  Psychiatric: Normal interaction, Coherent speech, Euthymic mood, Appropriate affect   Assessment:       ICD-10-CM ICD-9-CM   1. Benign essential HTN  I10 401.1   2. Depression, unspecified depression type  F32.A 311   3. Needs flu shot  Z23 V04.81   4. Mixed hyperlipidemia  E78.2 272.2   5. Type 2 diabetes mellitus without complication, without long-term current use of insulin  E11.9 250.00   6. Hypertension, unspecified type  I10 401.9   7. Depression with anxiety  F41.8 300.4   8. Blood loss anemia  D50.0 280.0   9. S/P ORIF (open reduction internal fixation) fracture  Z98.890 V45.89    Z87.81 V15.51   10. Encounter for support and coordination of transition of care  Z76.89 V65.8        Plan:     1. S/P ORIF (open reduction internal fixation) fracture  2. Blood loss anemia  3. " Encounter for support and coordination of transition of care  -     CBC Auto Differential; Future; Expected date: 10/10/2023        - ER precautions provided   Keep scheduled follow up with Dr. Velazquez    4. Benign essential HTN  -     lisinopriL-hydrochlorothiazide (PRINZIDE,ZESTORETIC) 20-12.5 mg per tablet; Take 1 tablet by mouth once daily.  Dispense: 90 tablet; Refill: 0       - blood pressure not at goal   Resume medications as prescribed   Start 35 minutes of brisk walking along with dash diet  Log given to the patient    5. Depression, recurrent  -     EScitalopram oxalate (LEXAPRO) 20 MG tablet; Take 1 tablet (20 mg total) by mouth once daily.  Dispense: 30 tablet; Refill: 2  Resume Lexapro   PHQ-9 score today is 10   Recommend to start mental health exercises like yoga    6. Needs flu shot  -     Influenza (FLUAD) - Quadrivalent (Adjuvanted) *Preferred* (65+) (PF)    7. Mixed hyperlipidemia  -     pravastatin (PRAVACHOL) 20 MG tablet; Take 1 tablet (20 mg total) by mouth once daily.  Dispense: 90 tablet; Refill: 0  Continue pravastatin as prescribed     8. Type 2 diabetes mellitus without complication, without long-term current use of insulin  9. Obesity   -     metFORMIN (GLUCOPHAGE-XR) 500 MG ER 24hr tablet; Take 1 tablet (500 mg total) by mouth 2 (two) times daily.  Dispense: 180 tablet; Refill: 0  Continue metformin as prescribed   We will resume Ozempic and discuss about the same in the next clinic visit   Body mass index is 37.49 kg/m².  Goal BMI <30.  Exercise 5 times a week for 30 minutes per day.  Avoid soda, simple sugars, excessive rice, potatoes or bread. Limit fast foods and fried foods.  Choose complex carbs in moderation (example: green vegetables, beans, oatmeal). Eat plenty of fresh fruits and vegetables with lean meats daily.  Do not skip meals. Eat a balanced portion size.  Avoid fad diets. Consider permanent healthy life style changes.            Medication List with Changes/Refills    Current Medications    BUSPIRONE (BUSPAR) 10 MG TABLET    Take 1 tablet (10 mg total) by mouth 3 (three) times daily.       Start Date: 8/29/2023 End Date: 11/27/2023    CYCLOBENZAPRINE (FLEXERIL) 10 MG TABLET    Take 10 mg by mouth every evening. Takes half the tablet.       Start Date: 7/17/2023 End Date: --    DICLOFENAC SODIUM (SOLARAZE) 3 % GEL    Apply 1 application  topically 2 (two) times daily.       Start Date: 7/17/2023 End Date: --    ESCITALOPRAM OXALATE (LEXAPRO) 20 MG TABLET    Take 1 tablet (20 mg total) by mouth once daily.       Start Date: 8/29/2023 End Date: 11/27/2023    LISINOPRIL-HYDROCHLOROTHIAZIDE (PRINZIDE,ZESTORETIC) 20-12.5 MG PER TABLET    Take 1 tablet by mouth once daily.       Start Date: 8/29/2023 End Date: 11/27/2023    METFORMIN (GLUCOPHAGE-XR) 500 MG ER 24HR TABLET    Take 1 tablet (500 mg total) by mouth 2 (two) times daily.       Start Date: 8/29/2023 End Date: 11/27/2023    PRAVASTATIN (PRAVACHOL) 40 MG TABLET    Take 0.5 tablets (20 mg total) by mouth once daily.       Start Date: 8/29/2023 End Date: 11/27/2023    SEMAGLUTIDE (OZEMPIC) 0.25 MG OR 0.5 MG (2 MG/3 ML) PEN INJECTOR    Inject 0.25 mg into the skin every 7 days.       Start Date: 8/30/2023 End Date: --          Follow up for Follow Up HTN and Ozempic .   In addition to their scheduled follow up, the patient has also been instructed to follow up on as needed basis.

## 2023-10-10 NOTE — TELEPHONE ENCOUNTER
----- Message from Sonya Vela MD sent at 10/10/2023  8:57 AM CDT -----  Please fax the Mammogram orders to [facility]. Orders are in chart. TIA

## 2023-10-11 ENCOUNTER — TELEPHONE (OUTPATIENT)
Dept: FAMILY MEDICINE | Facility: CLINIC | Age: 68
End: 2023-10-11
Payer: MEDICARE

## 2023-10-11 NOTE — TELEPHONE ENCOUNTER
Patient notified of results, verbalized.  Patient also wanted to inform that the [facility] called to schedule her mammogram but due to the recent arm surgery they will schedule once she is healed.

## 2023-10-11 NOTE — PROGRESS NOTES
Please inform patient of lab results.    1. Hemoglobin improved    2. Continue iron supplementations/food    3. Increase platelets   Likely reactive  We will repeat in few weeks    Thanks,    Dr. Vela

## 2023-10-11 NOTE — TELEPHONE ENCOUNTER
----- Message from Sonya Vela MD sent at 10/11/2023 10:26 AM CDT -----  Please inform patient of lab results.    1. Hemoglobin improved    2. Continue iron supplementations/food    3. Increase platelets   Likely reactive  We will repeat in few weeks    Thanks,    Dr. Vela

## 2023-11-01 ENCOUNTER — OFFICE VISIT (OUTPATIENT)
Dept: FAMILY MEDICINE | Facility: CLINIC | Age: 68
End: 2023-11-01
Payer: MEDICARE

## 2023-11-01 VITALS
BODY MASS INDEX: 36.77 KG/M2 | DIASTOLIC BLOOD PRESSURE: 80 MMHG | OXYGEN SATURATION: 98 % | HEIGHT: 66 IN | RESPIRATION RATE: 17 BRPM | SYSTOLIC BLOOD PRESSURE: 100 MMHG | WEIGHT: 228.81 LBS | TEMPERATURE: 98 F | HEART RATE: 100 BPM

## 2023-11-01 DIAGNOSIS — E11.65 TYPE 2 DIABETES MELLITUS WITH HYPERGLYCEMIA, WITHOUT LONG-TERM CURRENT USE OF INSULIN: ICD-10-CM

## 2023-11-01 DIAGNOSIS — F32.A DEPRESSION, UNSPECIFIED DEPRESSION TYPE: ICD-10-CM

## 2023-11-01 DIAGNOSIS — E66.01 CLASS 2 SEVERE OBESITY DUE TO EXCESS CALORIES WITH SERIOUS COMORBIDITY AND BODY MASS INDEX (BMI) OF 36.0 TO 36.9 IN ADULT: ICD-10-CM

## 2023-11-01 PROCEDURE — 3079F PR MOST RECENT DIASTOLIC BLOOD PRESSURE 80-89 MM HG: ICD-10-PCS | Mod: CPTII,,, | Performed by: STUDENT IN AN ORGANIZED HEALTH CARE EDUCATION/TRAINING PROGRAM

## 2023-11-01 PROCEDURE — 3008F BODY MASS INDEX DOCD: CPT | Mod: CPTII,,, | Performed by: STUDENT IN AN ORGANIZED HEALTH CARE EDUCATION/TRAINING PROGRAM

## 2023-11-01 PROCEDURE — 99214 OFFICE O/P EST MOD 30 MIN: CPT | Mod: ,,, | Performed by: STUDENT IN AN ORGANIZED HEALTH CARE EDUCATION/TRAINING PROGRAM

## 2023-11-01 PROCEDURE — 3044F PR MOST RECENT HEMOGLOBIN A1C LEVEL <7.0%: ICD-10-PCS | Mod: CPTII,,, | Performed by: STUDENT IN AN ORGANIZED HEALTH CARE EDUCATION/TRAINING PROGRAM

## 2023-11-01 PROCEDURE — 4010F ACE/ARB THERAPY RXD/TAKEN: CPT | Mod: CPTII,,, | Performed by: STUDENT IN AN ORGANIZED HEALTH CARE EDUCATION/TRAINING PROGRAM

## 2023-11-01 PROCEDURE — 3066F NEPHROPATHY DOC TX: CPT | Mod: CPTII,,, | Performed by: STUDENT IN AN ORGANIZED HEALTH CARE EDUCATION/TRAINING PROGRAM

## 2023-11-01 PROCEDURE — 1160F RVW MEDS BY RX/DR IN RCRD: CPT | Mod: CPTII,,, | Performed by: STUDENT IN AN ORGANIZED HEALTH CARE EDUCATION/TRAINING PROGRAM

## 2023-11-01 PROCEDURE — 3079F DIAST BP 80-89 MM HG: CPT | Mod: CPTII,,, | Performed by: STUDENT IN AN ORGANIZED HEALTH CARE EDUCATION/TRAINING PROGRAM

## 2023-11-01 PROCEDURE — 1125F PR PAIN SEVERITY QUANTIFIED, PAIN PRESENT: ICD-10-PCS | Mod: CPTII,,, | Performed by: STUDENT IN AN ORGANIZED HEALTH CARE EDUCATION/TRAINING PROGRAM

## 2023-11-01 PROCEDURE — 3008F PR BODY MASS INDEX (BMI) DOCUMENTED: ICD-10-PCS | Mod: CPTII,,, | Performed by: STUDENT IN AN ORGANIZED HEALTH CARE EDUCATION/TRAINING PROGRAM

## 2023-11-01 PROCEDURE — 1125F AMNT PAIN NOTED PAIN PRSNT: CPT | Mod: CPTII,,, | Performed by: STUDENT IN AN ORGANIZED HEALTH CARE EDUCATION/TRAINING PROGRAM

## 2023-11-01 PROCEDURE — 1101F PR PT FALLS ASSESS DOC 0-1 FALLS W/OUT INJ PAST YR: ICD-10-PCS | Mod: CPTII,,, | Performed by: STUDENT IN AN ORGANIZED HEALTH CARE EDUCATION/TRAINING PROGRAM

## 2023-11-01 PROCEDURE — 3288F FALL RISK ASSESSMENT DOCD: CPT | Mod: CPTII,,, | Performed by: STUDENT IN AN ORGANIZED HEALTH CARE EDUCATION/TRAINING PROGRAM

## 2023-11-01 PROCEDURE — 4010F PR ACE/ARB THEARPY RXD/TAKEN: ICD-10-PCS | Mod: CPTII,,, | Performed by: STUDENT IN AN ORGANIZED HEALTH CARE EDUCATION/TRAINING PROGRAM

## 2023-11-01 PROCEDURE — 3074F PR MOST RECENT SYSTOLIC BLOOD PRESSURE < 130 MM HG: ICD-10-PCS | Mod: CPTII,,, | Performed by: STUDENT IN AN ORGANIZED HEALTH CARE EDUCATION/TRAINING PROGRAM

## 2023-11-01 PROCEDURE — 3061F PR NEG MICROALBUMINURIA RESULT DOCUMENTED/REVIEW: ICD-10-PCS | Mod: CPTII,,, | Performed by: STUDENT IN AN ORGANIZED HEALTH CARE EDUCATION/TRAINING PROGRAM

## 2023-11-01 PROCEDURE — 3066F PR DOCUMENTATION OF TREATMENT FOR NEPHROPATHY: ICD-10-PCS | Mod: CPTII,,, | Performed by: STUDENT IN AN ORGANIZED HEALTH CARE EDUCATION/TRAINING PROGRAM

## 2023-11-01 PROCEDURE — 1101F PT FALLS ASSESS-DOCD LE1/YR: CPT | Mod: CPTII,,, | Performed by: STUDENT IN AN ORGANIZED HEALTH CARE EDUCATION/TRAINING PROGRAM

## 2023-11-01 PROCEDURE — 1159F MED LIST DOCD IN RCRD: CPT | Mod: CPTII,,, | Performed by: STUDENT IN AN ORGANIZED HEALTH CARE EDUCATION/TRAINING PROGRAM

## 2023-11-01 PROCEDURE — 1159F PR MEDICATION LIST DOCUMENTED IN MEDICAL RECORD: ICD-10-PCS | Mod: CPTII,,, | Performed by: STUDENT IN AN ORGANIZED HEALTH CARE EDUCATION/TRAINING PROGRAM

## 2023-11-01 PROCEDURE — 1160F PR REVIEW ALL MEDS BY PRESCRIBER/CLIN PHARMACIST DOCUMENTED: ICD-10-PCS | Mod: CPTII,,, | Performed by: STUDENT IN AN ORGANIZED HEALTH CARE EDUCATION/TRAINING PROGRAM

## 2023-11-01 PROCEDURE — 3074F SYST BP LT 130 MM HG: CPT | Mod: CPTII,,, | Performed by: STUDENT IN AN ORGANIZED HEALTH CARE EDUCATION/TRAINING PROGRAM

## 2023-11-01 PROCEDURE — 99214 PR OFFICE/OUTPT VISIT, EST, LEVL IV, 30-39 MIN: ICD-10-PCS | Mod: ,,, | Performed by: STUDENT IN AN ORGANIZED HEALTH CARE EDUCATION/TRAINING PROGRAM

## 2023-11-01 PROCEDURE — 3044F HG A1C LEVEL LT 7.0%: CPT | Mod: CPTII,,, | Performed by: STUDENT IN AN ORGANIZED HEALTH CARE EDUCATION/TRAINING PROGRAM

## 2023-11-01 PROCEDURE — 3061F NEG MICROALBUMINURIA REV: CPT | Mod: CPTII,,, | Performed by: STUDENT IN AN ORGANIZED HEALTH CARE EDUCATION/TRAINING PROGRAM

## 2023-11-01 PROCEDURE — 3288F PR FALLS RISK ASSESSMENT DOCUMENTED: ICD-10-PCS | Mod: CPTII,,, | Performed by: STUDENT IN AN ORGANIZED HEALTH CARE EDUCATION/TRAINING PROGRAM

## 2023-11-01 RX ORDER — VENLAFAXINE HYDROCHLORIDE 37.5 MG/1
37.5 CAPSULE, EXTENDED RELEASE ORAL DAILY
Qty: 30 CAPSULE | Refills: 1 | Status: SHIPPED | OUTPATIENT
Start: 2023-11-01 | End: 2023-11-08 | Stop reason: SDUPTHER

## 2023-11-01 NOTE — PROGRESS NOTES
Patient ID: [id number]     Chief Complaint:   HPI:      Disclaimer:  This note is prepared using voice recognition software and as such is likely to have errors despite attempts at proofreading. Please contact me for questions.     Rosetta Loving is a 67 y.o. female here today for the following      Acute Issues-  Open reduction internal fixation of left humerus.   Dr. Velazquez did the surgery   Stable     Hypertension  Lisinopril/hydrochlorothiazide  Currently asymptomatic     Depression  Consistent with the medication   Lexapro not working   PHQ-9 score today is 11  Denies suicidal/homicidal symptoms     Obesity  DM   Hemoglobin A1c   Date Value Ref Range Status   09/25/2023 6.7 <=7.0 % Final   06/04/2021 6.2 <<=7.0 % Final   01/14/2021 6.1 <<=7.0 % Final     Body mass index is 36.93 kg/m².  Currently on Ozempic 0.25   Chronic Issues-    ----------------------------  Diabetes mellitus, type 2  Hypertension     Past Surgical History:   Procedure Laterality Date    Arm repair Left 10/09/2023    CHOLECYSTECTOMY  1994    Yes    COLONOSCOPY  1954    Dr. Elie Pickett    GALLBLADDER SURGERY      HIP SURGERY      HYSTERECTOMY      KNEE SURGERY      TONSILLECTOMY         Review of patient's allergies indicates:  No Known Allergies    Current Outpatient Medications   Medication Instructions    busPIRone (BUSPAR) 10 mg, Oral, 3 times daily    diclofenac sodium (SOLARAZE) 3 % gel 1 application , Topical (Top), 2 times daily    EScitalopram oxalate (LEXAPRO) 20 mg, Oral, Daily    lisinopriL-hydrochlorothiazide (PRINZIDE,ZESTORETIC) 20-12.5 mg per tablet 1 tablet, Oral, Daily    metFORMIN (GLUCOPHAGE-XR) 500 mg, Oral, 2 times daily    OZEMPIC 0.25 mg, Subcutaneous, Every 7 days    pravastatin (PRAVACHOL) 20 mg, Oral, Daily       Social History     Socioeconomic History    Marital status:    Tobacco Use    Smoking status: Never     Passive exposure: Never    Smokeless tobacco: Never    Tobacco comments:  "    Never   Substance and Sexual Activity    Alcohol use: Yes     Alcohol/week: 1.0 standard drink of alcohol     Types: 1 Glasses of wine per week     Comment: Social    Drug use: Never    Sexual activity: Not Currently     Partners: Male     Birth control/protection: Abstinence     Comment: Abstinence        Family History   Problem Relation Age of Onset    Arthritis Mother         Yes    Cancer Mother         Breast    Depression Mother         Yes    Hypertension Mother         Yes    Mental illness Mother         Yes    Diabetes Father         Yes        Patient Care Team:  Sonya Vela MD as PCP - General (Family Medicine)     Subjective:     ROS    See HPI for details    Constitutional: Denies Change in appetite. Denies Chills. Denies Fever. Denies Night sweats.  Respiratory: Denies Cough. Denies Shortness of breath. Denies Shortness of breath with exertion. Denies Wheezing.  Cardiovascular: DeniesChest pain at rest. Denies Chest pain with exertion. Denies Irregular heartbeat. Denies Palpitations. Denies Edema.  Gastrointestinal: Denies Abdominal pain. DeniesDiarrhea. Denies Nausea. Denies Vomiting. Denies Hematemesis or Hematochezia.  Genitourinary: Denies Dysuria. Denies Urinary frequency. Denies Urinary urgency. Denies Blood in urine.  Endocrine: Denies Cold intolerance. Denies Excessive thirst. Denies Heat intolerance. Denies Weight loss. Denies Weight gain.  Musculoskeletal: Denies Painful joints. Denies Weakness.  Integumentary: Denies Rash. Denies Itching. Denies Dry skin.  Neurologic: Denies Dizziness. Denies Fainting. Denies Headache.  Psychiatric: Denies Depression. Denies Anxiety. Denies Suicidal/Homicidal ideations.    All Other ROS: Negative except as stated in HPI.  Objective:     Visit Vitals  /80 (BP Location: Right arm, Patient Position: Sitting, BP Method: Large (Manual))   Pulse 100   Temp 98.1 °F (36.7 °C) (Oral)   Resp 17   Ht 5' 6" (1.676 m)   Wt 103.8 kg (228 lb 12.8 oz) "   SpO2 98%   BMI 36.93 kg/m²       Physical Exam    General: Alert and oriented, obese, No acute distress.  Respiratory: Clear to auscultation bilaterally; No wheezes, rales or rhonchi,Non-labored respirations, Symmetrical chest wall expansion.  Cardiovascular: Regular rate and rhythm, S1/S2 normal, No murmurs, rubs or gallops.  Gastrointestinal: Soft, Non-tender, Non-distended, Normal bowel sounds, No palpable organomegaly.  Musculoskeletal:   Left upper extremity-  Inspection-has a sling, mild swelling, discoloration as compared to the right arm  Palpation-mildly tender  Range of motion restricted   Extremities: No clubbing, cyanosis or edema  Neurologic: No focal deficits  Psychiatric: Normal interaction, Coherent speech, Euthymic mood, Appropriate affect   Assessment:       ICD-10-CM ICD-9-CM   1. Depression, unspecified depression type  F32.A 311   2. Type 2 diabetes mellitus with hyperglycemia, without long-term current use of insulin  E11.65 250.00     790.29   3. Class 2 severe obesity due to excess calories with serious comorbidity and body mass index (BMI) of 36.0 to 36.9 in adult  E66.01 278.01    Z68.36 V85.36      1. Depression, recurrent  PHQ-9 score today is 11  Start Venlafaxine   Discontinue Lexapro  Continue BuSpar p.r.n.  Recommend to start mental health exercises like yoga    2. Type 2 diabetes mellitus without complication, without long-term current use of insulin  3. Obesity   -     metFORMIN (GLUCOPHAGE-XR) 500 MG ER 24hr tablet; Take 1 tablet (500 mg total) by mouth 2 (two) times daily.  Dispense: 180 tablet; Refill: 0  Continue metformin as prescribed  Resume Ozempic  0.5 mg Q weekly     Body mass index is 36.93 kg/m².  Goal BMI <30.  Exercise 5 times a week for 30 minutes per day.  Avoid soda, simple sugars, excessive rice, potatoes or bread. Limit fast foods and fried foods.  Choose complex carbs in moderation (example: green vegetables, beans, oatmeal). Eat plenty of fresh fruits and  vegetables with lean meats daily.  Do not skip meals. Eat a balanced portion size.  Avoid fad diets. Consider permanent healthy life style changes.            Medication List with Changes/Refills   Current Medications    BUSPIRONE (BUSPAR) 10 MG TABLET    Take 1 tablet (10 mg total) by mouth 3 (three) times daily.       Start Date: 8/29/2023 End Date: 11/27/2023    DICLOFENAC SODIUM (SOLARAZE) 3 % GEL    Apply 1 application  topically 2 (two) times daily.       Start Date: 7/17/2023 End Date: --    ESCITALOPRAM OXALATE (LEXAPRO) 20 MG TABLET    Take 1 tablet (20 mg total) by mouth once daily.       Start Date: 10/10/2023End Date: 1/8/2024    LISINOPRIL-HYDROCHLOROTHIAZIDE (PRINZIDE,ZESTORETIC) 20-12.5 MG PER TABLET    Take 1 tablet by mouth once daily.       Start Date: 10/10/2023End Date: 1/8/2024    METFORMIN (GLUCOPHAGE-XR) 500 MG ER 24HR TABLET    Take 1 tablet (500 mg total) by mouth 2 (two) times daily.       Start Date: 10/10/2023End Date: 1/8/2024    PRAVASTATIN (PRAVACHOL) 20 MG TABLET    Take 1 tablet (20 mg total) by mouth once daily.       Start Date: 10/10/2023End Date: 1/8/2024    SEMAGLUTIDE (OZEMPIC) 0.25 MG OR 0.5 MG (2 MG/3 ML) PEN INJECTOR    Inject 0.25 mg into the skin every 7 days.       Start Date: 8/30/2023 End Date: --          Follow up in about 5 weeks (around 12/6/2023) for Virtual Visit depression and pick titration..   In addition to their scheduled follow up, the patient has also been instructed to follow up on as needed basis.

## 2023-11-06 DIAGNOSIS — F41.8 DEPRESSION WITH ANXIETY: ICD-10-CM

## 2023-11-06 RX ORDER — BUSPIRONE HYDROCHLORIDE 10 MG/1
10 TABLET ORAL 3 TIMES DAILY
Qty: 90 TABLET | Refills: 2 | Status: SHIPPED | OUTPATIENT
Start: 2023-11-06 | End: 2023-12-06 | Stop reason: SDUPTHER

## 2023-11-06 RX ORDER — BUSPIRONE HYDROCHLORIDE 10 MG/1
10 TABLET ORAL 3 TIMES DAILY
Qty: 90 TABLET | Refills: 2 | Status: SHIPPED | OUTPATIENT
Start: 2023-11-06 | End: 2023-11-06 | Stop reason: SDUPTHER

## 2023-11-06 NOTE — TELEPHONE ENCOUNTER
----- Message from Daxa Calvillo sent at 11/6/2023  8:26 AM CST -----  Regarding: Med refill  [facility] pharmacy is requesting a refill on Buspirone 10mg tab. Qty.90. Take 1 tablet 3 times daily.

## 2023-11-08 ENCOUNTER — TELEPHONE (OUTPATIENT)
Dept: FAMILY MEDICINE | Facility: CLINIC | Age: 68
End: 2023-11-08
Payer: MEDICARE

## 2023-11-08 DIAGNOSIS — F41.8 DEPRESSION WITH ANXIETY: ICD-10-CM

## 2023-11-08 DIAGNOSIS — F32.A DEPRESSION, UNSPECIFIED DEPRESSION TYPE: ICD-10-CM

## 2023-11-08 RX ORDER — VENLAFAXINE HYDROCHLORIDE 37.5 MG/1
37.5 CAPSULE, EXTENDED RELEASE ORAL DAILY
Qty: 30 CAPSULE | Refills: 1 | Status: SHIPPED | OUTPATIENT
Start: 2023-11-08 | End: 2023-12-06 | Stop reason: SDUPTHER

## 2023-11-08 NOTE — TELEPHONE ENCOUNTER
----- Message from All Downing sent at 11/8/2023 12:01 PM CST -----  .Type:  Needs Medical Advice    Who Called: pt  Symptoms (please be specific):    How long has patient had these symptoms:    Pharmacy name and phone #:    Would the patient rather a call back or a response via [facility]? Call back  Best Call Back Number: [phone]   Additional Information: calling to discuss current medications

## 2023-11-20 DIAGNOSIS — E11.9 TYPE 2 DIABETES MELLITUS WITHOUT COMPLICATION, WITHOUT LONG-TERM CURRENT USE OF INSULIN: ICD-10-CM

## 2023-11-20 DIAGNOSIS — E78.2 MIXED HYPERLIPIDEMIA: ICD-10-CM

## 2023-11-20 RX ORDER — PRAVASTATIN SODIUM 20 MG/1
20 TABLET ORAL DAILY
Qty: 90 TABLET | Refills: 0 | Status: SHIPPED | OUTPATIENT
Start: 2023-11-20 | End: 2023-12-06 | Stop reason: SDUPTHER

## 2023-11-20 RX ORDER — SEMAGLUTIDE 0.68 MG/ML
0.5 INJECTION, SOLUTION SUBCUTANEOUS
Qty: 3 ML | Refills: 0 | Status: SHIPPED | OUTPATIENT
Start: 2023-11-20 | End: 2023-12-06

## 2023-11-21 DIAGNOSIS — E11.9 TYPE 2 DIABETES MELLITUS WITHOUT COMPLICATION, WITHOUT LONG-TERM CURRENT USE OF INSULIN: ICD-10-CM

## 2023-11-21 RX ORDER — METFORMIN HYDROCHLORIDE 500 MG/1
500 TABLET, EXTENDED RELEASE ORAL 2 TIMES DAILY
Qty: 180 TABLET | Refills: 0 | Status: SHIPPED | OUTPATIENT
Start: 2023-11-21 | End: 2023-12-06 | Stop reason: SDUPTHER

## 2023-12-04 DIAGNOSIS — I10 BENIGN ESSENTIAL HTN: ICD-10-CM

## 2023-12-04 RX ORDER — LISINOPRIL AND HYDROCHLOROTHIAZIDE 12.5; 2 MG/1; MG/1
1 TABLET ORAL DAILY
Qty: 90 TABLET | Refills: 0 | Status: SHIPPED | OUTPATIENT
Start: 2023-12-04 | End: 2023-12-06 | Stop reason: SDUPTHER

## 2023-12-04 NOTE — TELEPHONE ENCOUNTER
----- Message from Ashlie Smith sent at 12/4/2023  2:13 PM CST -----  Regarding: refill  Type:  RX Refill Request    Who Called: Rosetta    Refill or New Rx:refill    RX Name and Strength:isinopriL-hydrochlorothiazide (PRINZIDE,ZESTORETIC) 20-12.5 mg per tablet    How is the patient currently taking it? (ex. 1XDay):    Is this a 30 day or 90 day RX:    Preferred Pharmacy with phone number:[facility]     Local or Mail Order:Mail    Ordering Provider:    Would the patient rather a call back or a response via [facility]? Call back     Best Call Back Number:[phone]    Additional Information:

## 2023-12-06 ENCOUNTER — OFFICE VISIT (OUTPATIENT)
Dept: FAMILY MEDICINE | Facility: CLINIC | Age: 68
End: 2023-12-06
Payer: MEDICARE

## 2023-12-06 VITALS — WEIGHT: 220 LBS | BODY MASS INDEX: 35.36 KG/M2 | HEIGHT: 66 IN

## 2023-12-06 DIAGNOSIS — E78.2 MIXED HYPERLIPIDEMIA: ICD-10-CM

## 2023-12-06 DIAGNOSIS — F41.8 DEPRESSION WITH ANXIETY: ICD-10-CM

## 2023-12-06 DIAGNOSIS — Z12.31 BREAST CANCER SCREENING BY MAMMOGRAM: ICD-10-CM

## 2023-12-06 DIAGNOSIS — I10 BENIGN ESSENTIAL HTN: ICD-10-CM

## 2023-12-06 DIAGNOSIS — E11.9 TYPE 2 DIABETES MELLITUS WITHOUT COMPLICATION, WITHOUT LONG-TERM CURRENT USE OF INSULIN: ICD-10-CM

## 2023-12-06 PROCEDURE — 3044F HG A1C LEVEL LT 7.0%: CPT | Mod: CPTII,95,, | Performed by: STUDENT IN AN ORGANIZED HEALTH CARE EDUCATION/TRAINING PROGRAM

## 2023-12-06 PROCEDURE — 3066F PR DOCUMENTATION OF TREATMENT FOR NEPHROPATHY: ICD-10-PCS | Mod: CPTII,95,, | Performed by: STUDENT IN AN ORGANIZED HEALTH CARE EDUCATION/TRAINING PROGRAM

## 2023-12-06 PROCEDURE — 3008F BODY MASS INDEX DOCD: CPT | Mod: CPTII,95,, | Performed by: STUDENT IN AN ORGANIZED HEALTH CARE EDUCATION/TRAINING PROGRAM

## 2023-12-06 PROCEDURE — 4010F PR ACE/ARB THEARPY RXD/TAKEN: ICD-10-PCS | Mod: CPTII,95,, | Performed by: STUDENT IN AN ORGANIZED HEALTH CARE EDUCATION/TRAINING PROGRAM

## 2023-12-06 PROCEDURE — 3044F PR MOST RECENT HEMOGLOBIN A1C LEVEL <7.0%: ICD-10-PCS | Mod: CPTII,95,, | Performed by: STUDENT IN AN ORGANIZED HEALTH CARE EDUCATION/TRAINING PROGRAM

## 2023-12-06 PROCEDURE — 3288F FALL RISK ASSESSMENT DOCD: CPT | Mod: CPTII,95,, | Performed by: STUDENT IN AN ORGANIZED HEALTH CARE EDUCATION/TRAINING PROGRAM

## 2023-12-06 PROCEDURE — 3061F PR NEG MICROALBUMINURIA RESULT DOCUMENTED/REVIEW: ICD-10-PCS | Mod: CPTII,95,, | Performed by: STUDENT IN AN ORGANIZED HEALTH CARE EDUCATION/TRAINING PROGRAM

## 2023-12-06 PROCEDURE — 1159F MED LIST DOCD IN RCRD: CPT | Mod: CPTII,95,, | Performed by: STUDENT IN AN ORGANIZED HEALTH CARE EDUCATION/TRAINING PROGRAM

## 2023-12-06 PROCEDURE — 1101F PR PT FALLS ASSESS DOC 0-1 FALLS W/OUT INJ PAST YR: ICD-10-PCS | Mod: CPTII,95,, | Performed by: STUDENT IN AN ORGANIZED HEALTH CARE EDUCATION/TRAINING PROGRAM

## 2023-12-06 PROCEDURE — 3061F NEG MICROALBUMINURIA REV: CPT | Mod: CPTII,95,, | Performed by: STUDENT IN AN ORGANIZED HEALTH CARE EDUCATION/TRAINING PROGRAM

## 2023-12-06 PROCEDURE — 99214 PR OFFICE/OUTPT VISIT, EST, LEVL IV, 30-39 MIN: ICD-10-PCS | Mod: 95,,, | Performed by: STUDENT IN AN ORGANIZED HEALTH CARE EDUCATION/TRAINING PROGRAM

## 2023-12-06 PROCEDURE — 1101F PT FALLS ASSESS-DOCD LE1/YR: CPT | Mod: CPTII,95,, | Performed by: STUDENT IN AN ORGANIZED HEALTH CARE EDUCATION/TRAINING PROGRAM

## 2023-12-06 PROCEDURE — 1160F PR REVIEW ALL MEDS BY PRESCRIBER/CLIN PHARMACIST DOCUMENTED: ICD-10-PCS | Mod: CPTII,95,, | Performed by: STUDENT IN AN ORGANIZED HEALTH CARE EDUCATION/TRAINING PROGRAM

## 2023-12-06 PROCEDURE — 3066F NEPHROPATHY DOC TX: CPT | Mod: CPTII,95,, | Performed by: STUDENT IN AN ORGANIZED HEALTH CARE EDUCATION/TRAINING PROGRAM

## 2023-12-06 PROCEDURE — 4010F ACE/ARB THERAPY RXD/TAKEN: CPT | Mod: CPTII,95,, | Performed by: STUDENT IN AN ORGANIZED HEALTH CARE EDUCATION/TRAINING PROGRAM

## 2023-12-06 PROCEDURE — 1159F PR MEDICATION LIST DOCUMENTED IN MEDICAL RECORD: ICD-10-PCS | Mod: CPTII,95,, | Performed by: STUDENT IN AN ORGANIZED HEALTH CARE EDUCATION/TRAINING PROGRAM

## 2023-12-06 PROCEDURE — 3288F PR FALLS RISK ASSESSMENT DOCUMENTED: ICD-10-PCS | Mod: CPTII,95,, | Performed by: STUDENT IN AN ORGANIZED HEALTH CARE EDUCATION/TRAINING PROGRAM

## 2023-12-06 PROCEDURE — 3008F PR BODY MASS INDEX (BMI) DOCUMENTED: ICD-10-PCS | Mod: CPTII,95,, | Performed by: STUDENT IN AN ORGANIZED HEALTH CARE EDUCATION/TRAINING PROGRAM

## 2023-12-06 PROCEDURE — 1160F RVW MEDS BY RX/DR IN RCRD: CPT | Mod: CPTII,95,, | Performed by: STUDENT IN AN ORGANIZED HEALTH CARE EDUCATION/TRAINING PROGRAM

## 2023-12-06 PROCEDURE — 99214 OFFICE O/P EST MOD 30 MIN: CPT | Mod: 95,,, | Performed by: STUDENT IN AN ORGANIZED HEALTH CARE EDUCATION/TRAINING PROGRAM

## 2023-12-06 RX ORDER — PRAVASTATIN SODIUM 20 MG/1
20 TABLET ORAL DAILY
Qty: 90 TABLET | Refills: 0 | Status: SHIPPED | OUTPATIENT
Start: 2023-12-06 | End: 2024-01-12 | Stop reason: SDUPTHER

## 2023-12-06 RX ORDER — VENLAFAXINE HYDROCHLORIDE 75 MG/1
75 CAPSULE, EXTENDED RELEASE ORAL DAILY
Qty: 90 CAPSULE | Refills: 0 | Status: SHIPPED | OUTPATIENT
Start: 2023-12-06 | End: 2024-02-19 | Stop reason: SDUPTHER

## 2023-12-06 RX ORDER — BUSPIRONE HYDROCHLORIDE 10 MG/1
10 TABLET ORAL 3 TIMES DAILY
Qty: 90 TABLET | Refills: 2 | Status: SHIPPED | OUTPATIENT
Start: 2023-12-06 | End: 2024-03-07 | Stop reason: SDUPTHER

## 2023-12-06 RX ORDER — METFORMIN HYDROCHLORIDE 500 MG/1
500 TABLET, EXTENDED RELEASE ORAL 2 TIMES DAILY
Qty: 180 TABLET | Refills: 0 | Status: SHIPPED | OUTPATIENT
Start: 2023-12-06 | End: 2024-03-08 | Stop reason: SDUPTHER

## 2023-12-06 RX ORDER — LISINOPRIL AND HYDROCHLOROTHIAZIDE 12.5; 2 MG/1; MG/1
1 TABLET ORAL DAILY
Qty: 90 TABLET | Refills: 0 | Status: SHIPPED | OUTPATIENT
Start: 2023-12-06 | End: 2024-03-08 | Stop reason: SDUPTHER

## 2023-12-06 NOTE — PROGRESS NOTES
"  [facility] Office Visit Note - ADD Follow-up    [unfilled]    Chief Complaint   Depression (5 WK f/u) and Follow-up (Depression medicine titration 5 WK f/u)       History of Present Illness   This is a telemedicine note. Patient was treated using telemedicine, real time audio and video, according to [facility] protocols. Sonya MCCLELLAN MD, conducted the visit from the [facility]. The patient participated in the visit at a non-[facility] location selected by the patient, identified below. I am licensed in the state where the patient stated they are located. The patient stated that they understood and accepted the privacy and security risks to their information at their location. This visit is not recorded.    Patient was located at the patient's home    Disclaimer:  This note is prepared using voice recognition software and as such is likely to have errors despite attempts at proofreading. Please contact me for questions.       Hypertension  Lisinopril/hydrochlorothiazide  Currently asymptomatic   Would like to get a refill     Depression  Currently on venlafaxine, feels that she needs to go up on it  Also has BuSpar   Denies suicidal/homicidal symptoms      Obesity  DM         Hemoglobin A1c   Date Value Ref Range Status   09/25/2023 6.7 <=7.0 % Final   06/04/2021 6.2 <<=7.0 % Final   01/14/2021 6.1 <<=7.0 % Final      Body mass index is 36.93 kg/m².  Currently on Ozempic 0.5 mg   Would like to go up on it   Denies of any history/family history of thyroid, parathyroid, pancreatic, ME in tumor  Review of Systems     GENERAL:   no significant weight changes, no tics, no insomnia  CARDIAC:  no chest pain, no palpations or SOB  ABD: no n/v, no diarrhea  Psych:  no increased anxiety, medication working adequately    Physical Exam   Visit Vitals  Ht 5' 6" (1.676 m)   Wt 99.8 kg (220 lb)   BMI 35.51 kg/m²     GENERAL:  NAD, no significant weight loss  CHEST:  CTA bilaterally  CARDIAC:  RRR no murmurs audible  PSYCH: " Appropriate mood, affect normal      Assessment and Plan   1. Type 2 diabetes mellitus without complication, without long-term current use of insulin  Increase Ozempic to 1 mg   Continue metformin as prescribed   Continue 35 minutes of brisk walking and Mediterranean diet  -     semaglutide (OZEMPIC) 1 mg/dose (4 mg/3 mL); Inject 1 mg into the skin every 7 days.  Dispense: 3 mL; Refill: 0  -     metFORMIN (GLUCOPHAGE-XR) 500 MG ER 24hr tablet; Take 1 tablet (500 mg total) by mouth 2 (two) times daily.  Dispense: 180 tablet; Refill: 0    2. Depression with anxiety  Increase the dose of venlafaxine   -     venlafaxine (EFFEXOR-XR) 75 MG 24 hr capsule; Take 1 capsule (75 mg total) by mouth once daily.  Dispense: 90 capsule; Refill: 0  -     busPIRone (BUSPAR) 10 MG tablet; Take 1 tablet (10 mg total) by mouth 3 (three) times daily.  Dispense: 90 tablet; Refill: 2    3. Mixed hyperlipidemia  Continue pravastatin as prescribed  -     pravastatin (PRAVACHOL) 20 MG tablet; Take 1 tablet (20 mg total) by mouth once daily.  Dispense: 90 tablet; Refill: 0    4. Benign essential HTN  At goal  Continue Rx as prescribed with 35 minutes of brisk walking and dash diet-     lisinopriL-hydrochlorothiazide (PRINZIDE,ZESTORETIC) 20-12.5 mg per tablet; Take 1 tablet by mouth once daily.  Dispense: 90 tablet; Refill: 0    5. Breast cancer screening by mammogram  -     Mammo Digital Screening Bilat w/ Janusz; Future; Expected date: 12/06/2023      No follow-ups on file.     Education and counseling done face to face regarding medical conditions and plan. Contact office if new symptoms develop. Should any symptoms ever significantly worsen seek emergency medical attention/go to ER. Follow up at least yearly for wellness or sooner PRN. Nurse to call patient with any results. The patient is receptive, expresses understanding and is agreeable to plan. All questions have been answered.        Sonya Vela M.D.    Video Time  Documentation:  Spent 10 minutes with patient face to face discussed health concerns. More than 50% of this time was spent in counseling and coordination of care.    Answers submitted by the patient for this visit:  Review of Systems Questionnaire (Submitted on 12/6/2023)  activity change: No  unexpected weight change: No  neck pain: No  hearing loss: No  rhinorrhea: No  trouble swallowing: No  eye discharge: No  visual disturbance: No  chest tightness: No  wheezing: No  chest pain: No  palpitations: No  blood in stool: No  constipation: No  vomiting: No  diarrhea: No  polydipsia: No  polyuria: No  difficulty urinating: No  hematuria: No  menstrual problem: No  dysuria: No  joint swelling: No  arthralgias: No  headaches: No  weakness: No  confusion: No  dysphoric mood: No

## 2023-12-28 DIAGNOSIS — E11.9 TYPE 2 DIABETES MELLITUS WITHOUT COMPLICATION, WITHOUT LONG-TERM CURRENT USE OF INSULIN: ICD-10-CM

## 2024-01-04 DIAGNOSIS — E11.9 TYPE 2 DIABETES MELLITUS WITHOUT COMPLICATION, WITHOUT LONG-TERM CURRENT USE OF INSULIN: ICD-10-CM

## 2024-01-04 NOTE — TELEPHONE ENCOUNTER
----- Message from Shanon Hernandez sent at 1/4/2024  9:50 AM CST -----  Type:  Needs Medical Advice    Who Called: pt  Would the patient rather a call back or a response via [facility]? Call back  Best Call Back Number:  [phone]  Additional Information: pt stated she received a message, ask for a call back.    Type:  RX Refill Request    Who Called: pt  Refill or New Rx:refill  RX Name and Strength:semaglutide (OZEMPIC) 1 mg/dose (4 mg/3 mL)  Preferred Pharmacy with phone number:[facility] [address] in [place]  Local or Mail Order:local  Ordering Provider:Sonya Vela MD  Would the patient rather a call back or a response via [facility]? Call back  Best Call Back Number:[phone]  Additional Information: [facility] [address] in [place]

## 2024-01-11 ENCOUNTER — TELEPHONE (OUTPATIENT)
Dept: FAMILY MEDICINE | Facility: CLINIC | Age: 69
End: 2024-01-11
Payer: MEDICARE

## 2024-01-11 DIAGNOSIS — E11.9 TYPE 2 DIABETES MELLITUS WITHOUT COMPLICATION, WITHOUT LONG-TERM CURRENT USE OF INSULIN: ICD-10-CM

## 2024-01-11 NOTE — TELEPHONE ENCOUNTER
----- Message from All Chang sent at 1/11/2024 10:00 AM CST -----  .Type:  RX Refill Request    Who Called: pt  Refill or New Rx:Refill  RX Name and Strength:semaglutide (OZEMPIC) 1 mg/dose (4 mg/3 mL)  How is the patient currently taking it? (ex. 1XDay):Route: Inject 1 mg into the skin every 7 days. - Subcutaneous  Is this a 30 day or 90 day RX:30 day  Preferred Pharmacy with phone number:[facility] - [facility] [address]  Local or Mail Order:mail order  Ordering Provider:  Would the patient rather a call back or a response via [facility]? Call back  Best Call Back Number:  Additional Information:

## 2024-01-12 DIAGNOSIS — E78.2 MIXED HYPERLIPIDEMIA: ICD-10-CM

## 2024-01-12 RX ORDER — PRAVASTATIN SODIUM 20 MG/1
20 TABLET ORAL DAILY
Qty: 90 TABLET | Refills: 0 | Status: SHIPPED | OUTPATIENT
Start: 2024-01-12

## 2024-01-31 DIAGNOSIS — E11.9 TYPE 2 DIABETES MELLITUS WITHOUT COMPLICATION, WITHOUT LONG-TERM CURRENT USE OF INSULIN: ICD-10-CM

## 2024-01-31 DIAGNOSIS — E11.9 TYPE 2 DIABETES MELLITUS WITHOUT COMPLICATION, WITHOUT LONG-TERM CURRENT USE OF INSULIN: Primary | ICD-10-CM

## 2024-02-19 DIAGNOSIS — F41.8 DEPRESSION WITH ANXIETY: ICD-10-CM

## 2024-02-19 RX ORDER — VENLAFAXINE HYDROCHLORIDE 75 MG/1
75 CAPSULE, EXTENDED RELEASE ORAL DAILY
Qty: 90 CAPSULE | Refills: 0 | Status: SHIPPED | OUTPATIENT
Start: 2024-02-19 | End: 2024-02-20

## 2024-02-20 ENCOUNTER — OFFICE VISIT (OUTPATIENT)
Dept: FAMILY MEDICINE | Facility: CLINIC | Age: 69
End: 2024-02-20
Payer: MEDICARE

## 2024-02-20 VITALS — HEIGHT: 66 IN | WEIGHT: 225 LBS | BODY MASS INDEX: 36.16 KG/M2

## 2024-02-20 DIAGNOSIS — E66.01 CLASS 2 SEVERE OBESITY DUE TO EXCESS CALORIES WITH SERIOUS COMORBIDITY AND BODY MASS INDEX (BMI) OF 36.0 TO 36.9 IN ADULT: ICD-10-CM

## 2024-02-20 DIAGNOSIS — E11.65 TYPE 2 DIABETES MELLITUS WITH HYPERGLYCEMIA, WITHOUT LONG-TERM CURRENT USE OF INSULIN: ICD-10-CM

## 2024-02-20 DIAGNOSIS — R11.0 NAUSEA: ICD-10-CM

## 2024-02-20 DIAGNOSIS — F33.9 DEPRESSION, RECURRENT: ICD-10-CM

## 2024-02-20 DIAGNOSIS — K21.9 GASTROESOPHAGEAL REFLUX DISEASE WITHOUT ESOPHAGITIS: ICD-10-CM

## 2024-02-20 PROBLEM — E11.9 TYPE 2 DIABETES MELLITUS WITHOUT COMPLICATION, WITHOUT LONG-TERM CURRENT USE OF INSULIN: Status: RESOLVED | Noted: 2023-08-29 | Resolved: 2024-02-20

## 2024-02-20 PROCEDURE — 1101F PT FALLS ASSESS-DOCD LE1/YR: CPT | Mod: CPTII,95,, | Performed by: STUDENT IN AN ORGANIZED HEALTH CARE EDUCATION/TRAINING PROGRAM

## 2024-02-20 PROCEDURE — 3288F FALL RISK ASSESSMENT DOCD: CPT | Mod: CPTII,95,, | Performed by: STUDENT IN AN ORGANIZED HEALTH CARE EDUCATION/TRAINING PROGRAM

## 2024-02-20 PROCEDURE — 99214 OFFICE O/P EST MOD 30 MIN: CPT | Mod: 95,,, | Performed by: STUDENT IN AN ORGANIZED HEALTH CARE EDUCATION/TRAINING PROGRAM

## 2024-02-20 PROCEDURE — 3008F BODY MASS INDEX DOCD: CPT | Mod: CPTII,95,, | Performed by: STUDENT IN AN ORGANIZED HEALTH CARE EDUCATION/TRAINING PROGRAM

## 2024-02-20 PROCEDURE — 1159F MED LIST DOCD IN RCRD: CPT | Mod: CPTII,95,, | Performed by: STUDENT IN AN ORGANIZED HEALTH CARE EDUCATION/TRAINING PROGRAM

## 2024-02-20 RX ORDER — PANTOPRAZOLE SODIUM 40 MG/1
40 TABLET, DELAYED RELEASE ORAL DAILY
Qty: 90 TABLET | Refills: 0 | Status: SHIPPED | OUTPATIENT
Start: 2024-02-20 | End: 2024-05-20

## 2024-02-20 RX ORDER — ONDANSETRON 8 MG/1
8 TABLET, ORALLY DISINTEGRATING ORAL EVERY 12 HOURS PRN
Qty: 60 TABLET | Refills: 0 | Status: SHIPPED | OUTPATIENT
Start: 2024-02-20 | End: 2024-03-26 | Stop reason: SDUPTHER

## 2024-02-20 RX ORDER — VILAZODONE HYDROCHLORIDE 20 MG/1
20 TABLET ORAL DAILY
Qty: 90 TABLET | Refills: 0 | Status: SHIPPED | OUTPATIENT
Start: 2024-02-20 | End: 2024-04-03

## 2024-02-27 ENCOUNTER — TELEPHONE (OUTPATIENT)
Dept: FAMILY MEDICINE | Facility: CLINIC | Age: 69
End: 2024-02-27
Payer: MEDICARE

## 2024-02-27 NOTE — TELEPHONE ENCOUNTER
----- Message from Janice Tee sent at 2/27/2024 11:51 AM CST -----  Regarding: Prior Authorization needed  .Type:  Needs Medical Advice    Who Called: PT    Symptoms (please be specific): n/a     How long has patient had these symptoms:  n/a    Pharmacy name and phone #:  [facility] - [facility] [address]   Phone: [phone]  Fax: [phone]        Would the patient rather a call back or a response via [facility]?     Best Call Back Number: [phone]    Additional Information: Pt called to inform the office that a request for a prior authorization was needed and faxed over; she is requesting that the office completes it either electronically and/or over the phone, if possible. In regards to the medication listed below sot hat the pt can have a copay.     Phone#: [phone]    vilazodone (VIIBRYD) 20 mg Tab 90 tablet 0 2/20/2024 5/20/2024 No  Sig - Route: Take 1 tablet (20 mg total) by mouth once daily. - Oral

## 2024-03-07 DIAGNOSIS — F41.8 DEPRESSION WITH ANXIETY: ICD-10-CM

## 2024-03-07 RX ORDER — BUSPIRONE HYDROCHLORIDE 10 MG/1
10 TABLET ORAL 3 TIMES DAILY
Qty: 90 TABLET | Refills: 2 | Status: SHIPPED | OUTPATIENT
Start: 2024-03-07 | End: 2024-05-24 | Stop reason: SDUPTHER

## 2024-03-08 DIAGNOSIS — I10 BENIGN ESSENTIAL HTN: ICD-10-CM

## 2024-03-08 DIAGNOSIS — E11.9 TYPE 2 DIABETES MELLITUS WITHOUT COMPLICATION, WITHOUT LONG-TERM CURRENT USE OF INSULIN: ICD-10-CM

## 2024-03-08 RX ORDER — LISINOPRIL AND HYDROCHLOROTHIAZIDE 12.5; 2 MG/1; MG/1
1 TABLET ORAL DAILY
Qty: 90 TABLET | Refills: 0 | Status: SHIPPED | OUTPATIENT
Start: 2024-03-08 | End: 2024-03-08 | Stop reason: SDUPTHER

## 2024-03-08 RX ORDER — LISINOPRIL AND HYDROCHLOROTHIAZIDE 12.5; 2 MG/1; MG/1
1 TABLET ORAL DAILY
Qty: 90 TABLET | Refills: 0 | Status: SHIPPED | OUTPATIENT
Start: 2024-03-08 | End: 2024-05-29 | Stop reason: SDUPTHER

## 2024-03-08 RX ORDER — METFORMIN HYDROCHLORIDE 500 MG/1
500 TABLET, EXTENDED RELEASE ORAL 2 TIMES DAILY
Qty: 180 TABLET | Refills: 0 | Status: SHIPPED | OUTPATIENT
Start: 2024-03-08 | End: 2024-03-11 | Stop reason: SDUPTHER

## 2024-03-11 DIAGNOSIS — E11.9 TYPE 2 DIABETES MELLITUS WITHOUT COMPLICATION, WITHOUT LONG-TERM CURRENT USE OF INSULIN: ICD-10-CM

## 2024-03-11 RX ORDER — METFORMIN HYDROCHLORIDE 500 MG/1
500 TABLET, EXTENDED RELEASE ORAL 2 TIMES DAILY
Qty: 180 TABLET | Refills: 0 | Status: SHIPPED | OUTPATIENT
Start: 2024-03-11 | End: 2024-05-28 | Stop reason: SDUPTHER

## 2024-03-19 ENCOUNTER — LAB VISIT (OUTPATIENT)
Dept: LAB | Facility: HOSPITAL | Age: 69
End: 2024-03-19
Attending: STUDENT IN AN ORGANIZED HEALTH CARE EDUCATION/TRAINING PROGRAM
Payer: MEDICARE

## 2024-03-19 DIAGNOSIS — E11.65 TYPE 2 DIABETES MELLITUS WITH HYPERGLYCEMIA, WITHOUT LONG-TERM CURRENT USE OF INSULIN: ICD-10-CM

## 2024-03-19 LAB
EST. AVERAGE GLUCOSE BLD GHB EST-MCNC: 119.8 MG/DL
HBA1C MFR BLD: 5.8 %

## 2024-03-19 PROCEDURE — 83036 HEMOGLOBIN GLYCOSYLATED A1C: CPT

## 2024-03-19 PROCEDURE — 36415 COLL VENOUS BLD VENIPUNCTURE: CPT

## 2024-03-20 ENCOUNTER — PATIENT MESSAGE (OUTPATIENT)
Dept: FAMILY MEDICINE | Facility: CLINIC | Age: 69
End: 2024-03-20
Payer: MEDICARE

## 2024-03-20 NOTE — PROGRESS NOTES
Please inform patient of lab results.    1. A1c at goal   Keep up the good job   Continue current regimen      Thanks,    Dr. Vela

## 2024-03-26 DIAGNOSIS — R11.0 NAUSEA: ICD-10-CM

## 2024-03-26 RX ORDER — ONDANSETRON 8 MG/1
8 TABLET, ORALLY DISINTEGRATING ORAL EVERY 12 HOURS PRN
Qty: 60 TABLET | Refills: 0 | Status: SHIPPED | OUTPATIENT
Start: 2024-03-26 | End: 2024-04-25

## 2024-04-03 ENCOUNTER — TELEPHONE (OUTPATIENT)
Dept: FAMILY MEDICINE | Facility: CLINIC | Age: 69
End: 2024-04-03

## 2024-04-03 ENCOUNTER — OFFICE VISIT (OUTPATIENT)
Dept: FAMILY MEDICINE | Facility: CLINIC | Age: 69
End: 2024-04-03
Payer: MEDICARE

## 2024-04-03 DIAGNOSIS — F33.0 MILD EPISODE OF RECURRENT MAJOR DEPRESSIVE DISORDER: ICD-10-CM

## 2024-04-03 DIAGNOSIS — E11.65 TYPE 2 DIABETES MELLITUS WITH HYPERGLYCEMIA, WITHOUT LONG-TERM CURRENT USE OF INSULIN: ICD-10-CM

## 2024-04-03 PROCEDURE — 3044F HG A1C LEVEL LT 7.0%: CPT | Mod: CPTII,95,, | Performed by: STUDENT IN AN ORGANIZED HEALTH CARE EDUCATION/TRAINING PROGRAM

## 2024-04-03 PROCEDURE — 1160F RVW MEDS BY RX/DR IN RCRD: CPT | Mod: CPTII,95,, | Performed by: STUDENT IN AN ORGANIZED HEALTH CARE EDUCATION/TRAINING PROGRAM

## 2024-04-03 PROCEDURE — 99214 OFFICE O/P EST MOD 30 MIN: CPT | Mod: 95,,, | Performed by: STUDENT IN AN ORGANIZED HEALTH CARE EDUCATION/TRAINING PROGRAM

## 2024-04-03 PROCEDURE — 1159F MED LIST DOCD IN RCRD: CPT | Mod: CPTII,95,, | Performed by: STUDENT IN AN ORGANIZED HEALTH CARE EDUCATION/TRAINING PROGRAM

## 2024-04-03 PROCEDURE — 4010F ACE/ARB THERAPY RXD/TAKEN: CPT | Mod: CPTII,95,, | Performed by: STUDENT IN AN ORGANIZED HEALTH CARE EDUCATION/TRAINING PROGRAM

## 2024-04-03 RX ORDER — VILAZODONE HYDROCHLORIDE 40 MG/1
40 TABLET ORAL DAILY
Qty: 90 TABLET | Refills: 3 | Status: SHIPPED | OUTPATIENT
Start: 2024-04-03 | End: 2025-04-03

## 2024-04-03 RX ORDER — SEMAGLUTIDE 2.68 MG/ML
2 INJECTION, SOLUTION SUBCUTANEOUS
Qty: 3 ML | Refills: 2 | Status: SHIPPED | OUTPATIENT
Start: 2024-04-03 | End: 2024-04-08 | Stop reason: SDUPTHER

## 2024-04-03 NOTE — TELEPHONE ENCOUNTER
----- Message from Skyler Turcios sent at 4/3/2024 12:31 PM CDT -----  .Type:  Needs Medical Advice    Who Called: Rosetta  Symptoms (please be specific):    How long has patient had these symptoms:    Pharmacy name and phone #:    Would the patient rather a call back or a response via [facility]?   Best Call Back Number: [phone]  Additional Information: Patient requested a call back she was suppose to have a virtual apt with the doctor at 11:00 am today and no one called her back.  They normally call her first but no one called from the office.

## 2024-04-03 NOTE — PROGRESS NOTES
Patient ID: [id number]     Chief Complaint: No chief complaint on file.      HPI:     This is a telemedicine note. Patient was treated using telemedicine, real time audio and video, according to [facility] protocols. I, Sonya Vela MD, conducted the visit from the [facility]. The patient participated in the visit at a non-[facility] location selected by the patient, identified below. I am licensed in the state where the patient stated they are located. The patient stated that they understood and accepted the privacy and security risks to their information at their location. This visit is not recorded.    Patient was located at the patient's home.       Rosetta Loving is a 68 y.o. female here today for a telemedicine visit.      Depression  Currently on venlafaxine  PHQ-9 score today is 5   Reports it to be ineffective and would like to switch medications   Has tried Lexapro, Zoloft and Effexor in past     Obesity  DM         Hemoglobin A1c   Date Value Ref Range Status   09/25/2023 6.7 <=7.0 % Final   06/04/2021 6.2 <<=7.0 % Final   01/14/2021 6.1 <<=7.0 % Final   Body mass index is 36.93 kg/m².  Currently on Ozempic 1 mg   Would like to go up on it   Denies of any history/family history of thyroid, parathyroid, pancreatic, MEN in tumor    ----------------------------  Diabetes mellitus, type 2  Hypertension     Past Surgical History:   Procedure Laterality Date    APPENDECTOMY      Arm repair Left 10/09/2023    CHOLECYSTECTOMY  1994    Yes    COLONOSCOPY  1954    Dr. Elie Pickett    GALLBLADDER SURGERY      HIP SURGERY      HYSTERECTOMY      KNEE SURGERY      TONSILLECTOMY         Review of patient's allergies indicates:  No Known Allergies    Current Outpatient Medications   Medication Instructions    busPIRone (BUSPAR) 10 mg, Oral, 3 times daily    diclofenac sodium (SOLARAZE) 3 % gel 1 application , Topical (Top), 2 times daily    lisinopriL-hydrochlorothiazide (PRINZIDE,ZESTORETIC)  20-12.5 mg per tablet 1 tablet, Oral, Daily    metFORMIN (GLUCOPHAGE-XR) 500 mg, Oral, 2 times daily    ondansetron (ZOFRAN-ODT) 8 mg, Oral, Every 12 hours PRN    pantoprazole (PROTONIX) 40 mg, Oral, Daily    pravastatin (PRAVACHOL) 20 mg, Oral, Daily    semaglutide (OZEMPIC) 1 mg, Subcutaneous, Every 7 days    vilazodone (VIIBRYD) 20 mg, Oral, Daily       Social History     Socioeconomic History    Marital status:    Tobacco Use    Smoking status: Never     Passive exposure: Never    Smokeless tobacco: Never    Tobacco comments:     Never   Substance and Sexual Activity    Alcohol use: Yes     Alcohol/week: 1.0 standard drink of alcohol     Types: 1 Glasses of wine per week     Comment: Social    Drug use: Never    Sexual activity: Not Currently     Partners: Male     Birth control/protection: Abstinence     Comment: Abstinence     Social Determinants of Health     Financial Resource Strain: High Risk (2/17/2024)    Overall Financial Resource Strain (CARDIA)     Difficulty of Paying Living Expenses: Hard   Food Insecurity: Food Insecurity Present (2/17/2024)    Hunger Vital Sign     Worried About Running Out of Food in the Last Year: Sometimes true     Ran Out of Food in the Last Year: Sometimes true   Transportation Needs: No Transportation Needs (2/17/2024)    PRAPARE - Transportation     Lack of Transportation (Medical): No     Lack of Transportation (Non-Medical): No   Physical Activity: Insufficiently Active (2/17/2024)    Exercise Vital Sign     Days of Exercise per Week: 2 days     Minutes of Exercise per Session: 30 min   Stress: Stress Concern Present (2/17/2024)    [employer] - Occupational Stress Questionnaire     Feeling of Stress : Rather much   Social Connections: Unknown (2/17/2024)    Social Connection and Isolation Panel [NHANES]     Frequency of Communication with Friends and Family: Once a week     Frequency of Social Gatherings with Friends and Family: Once a  week     Active Member of Clubs or Organizations: No     Attends Club or Organization Meetings: Never     Marital Status:    Housing Stability: Low Risk  (2/17/2024)    Housing Stability Vital Sign     Unable to Pay for Housing in the Last Year: No     Number of Places Lived in the Last Year: 1     Unstable Housing in the Last Year: No        Family History   Problem Relation Age of Onset    Arthritis Mother         Yes    Cancer Mother         Breast    Depression Mother         Yes    Hypertension Mother         Yes    Mental illness Mother         Yes    Diabetes Father         Yes        Patient Care Team:  Sonya Vela MD as PCP - General (Family Medicine)      Subjective:       ROS    See HPI for details    Constitutional: Denies Change in appetite. Denies Chills. Denies Fever. Denies Night sweats.  Eye: Denies Blurred vision. Denies Discharge. Denies Eye pain.  ENT: Denies Decreased hearing. Denies Sore throat. Denies Swollen glands.  Respiratory: Denies Cough. Denies Shortness of breath. Denies Shortness of breath with exertion. Denies Wheezing.  Cardiovascular: DeniesChest pain at rest. Denies Chest pain with exertion. Denies Irregular heartbeat. Denies Palpitations. Denies Edema.  Gastrointestinal: Denies Abdominal pain. DeniesDiarrhea. Denies Nausea. Denies Vomiting. Denies Hematemesis or Hematochezia.  Genitourinary: Denies Dysuria. Denies Urinary frequency. Denies Urinary urgency. Denies Blood in urine.  Endocrine: Denies Cold intolerance. Denies Excessive thirst. Denies Heat intolerance. Denies Weight loss. Denies Weight gain.  Musculoskeletal: Denies Painful joints. Denies Weakness.  Integumentary: Denies Rash. Denies Itching. Denies Dry skin.  Neurologic: Denies Dizziness. Denies Fainting. Denies Headache.  Psychiatric: Denies Depression. Denies Anxiety. Denies Suicidal/Homicidal ideations.    All Other ROS: Negative except as stated in HPI.       Objective:     There were no vitals  taken for this visit.    Physical Exam    Physical Exam: LIMITED DUE TO TELEMEDICINE RESTRICTIONS.  General: Alert and oriented, No acute distress.  Head: Normocephalic, Atraumatic.  Eye: Sclera non-icteric.  Neck/Thyroid:  Full range of motion.  Respiratory: Non-labored respirations, Symmetrical chest wall expansion.  Musculoskeletal: Normal range of motion.  Integumentary:  No visible suspicious lesions or rashes. No diaphoresis.   Neurologic: No focal deficits  Psychiatric: Normal interaction, Coherent speech, Euthymic mood, Appropriate affect       Assessment:       ICD-10-CM ICD-9-CM   1. Mild episode of recurrent major depressive disorder  F33.0 296.31   2. Type 2 diabetes mellitus with hyperglycemia, without long-term current use of insulin  E11.65 250.00     790.29        Plan:     1. Mild episode of recurrent major depressive disorder  -     vilazodone (VIIBRYD) 40 mg Tab tablet; Take 1 tablet (40 mg total) by mouth once daily.  Dispense: 90 tablet; Refill: 3  Increase Viibryd to 40 mg    2. Type 2 diabetes mellitus with hyperglycemia, without long-term current use of insulin  -     semaglutide (OZEMPIC) 2 mg/dose (8 mg/3 mL) PnIj; Inject 2 mg into the skin every 7 days.  Dispense: 3 mL; Refill: 2  Increase Ozempic to 2 mg   - Denies of any personal/family history of pancreatic tumors/medullary carcinoma of thyroid/MEN tumors, or any unexplained flushing  -Diet. exercise, and 10% weight loss encouraged.   -Rx trial of GLP1 -inhibitors given with close monitoring to help with insulin resistance/ obesity.  - Will titrate Rx as needed/tolerated until max dose achieved.  -Notify M.D. or ER if symptoms persist or worsen, adverse Rx side effects, temp greater than 100.4, or any acute illness.             Medication List with Changes/Refills   Current Medications    BUSPIRONE (BUSPAR) 10 MG TABLET    Take 1 tablet (10 mg total) by mouth 3 (three) times daily.       Start Date: 3/7/2024  End Date: 6/5/2024     DICLOFENAC SODIUM (SOLARAZE) 3 % GEL    Apply 1 application  topically 2 (two) times daily.       Start Date: 7/17/2023 End Date: --    LISINOPRIL-HYDROCHLOROTHIAZIDE (PRINZIDE,ZESTORETIC) 20-12.5 MG PER TABLET    Take 1 tablet by mouth once daily.       Start Date: 3/8/2024  End Date: 6/6/2024    METFORMIN (GLUCOPHAGE-XR) 500 MG ER 24HR TABLET    Take 1 tablet (500 mg total) by mouth 2 (two) times daily.       Start Date: 3/11/2024 End Date: 6/9/2024    ONDANSETRON (ZOFRAN-ODT) 8 MG TBDL    Take 1 tablet (8 mg total) by mouth every 12 (twelve) hours as needed (nausea).       Start Date: 3/26/2024 End Date: 4/25/2024    PANTOPRAZOLE (PROTONIX) 40 MG TABLET    Take 1 tablet (40 mg total) by mouth once daily.       Start Date: 2/20/2024 End Date: 5/20/2024    PRAVASTATIN (PRAVACHOL) 20 MG TABLET    Take 1 tablet (20 mg total) by mouth once daily.       Start Date: 1/12/2024 End Date: --    SEMAGLUTIDE (OZEMPIC) 1 MG/DOSE (4 MG/3 ML)    Inject 1 mg into the skin every 7 days.       Start Date: 2/20/2024 End Date: 5/20/2024    VILAZODONE (VIIBRYD) 20 MG TAB    Take 1 tablet (20 mg total) by mouth once daily.       Start Date: 2/20/2024 End Date: 5/20/2024          Follow up in about 3 months (around 7/3/2024) for DM. . In addition to their scheduled follow up, the patient has also been instructed to follow up on as needed basis.       Video Time Documentation:  Spent 10 minutes with patient face to face discussed health concerns. More than 50% of this time was spent in counseling and coordination of care.  Answers submitted by the patient for this visit:  Diabetes Questionnaire (Submitted on 4/3/2024)  Chief Complaint: Diabetes problem  Diabetes type: type 2  MedicAlert ID: No  fatigue: No  foot paresthesias: No  foot ulcerations: No  polyphagia: No  polyuria: No  visual change: No  Symptom course: stable  confusion: No  hunger: No  mood changes: No  pallor: No  sleepiness: No  speech difficulty: No  sweats:  No  blackouts: No  hospitalization: No  nocturnal hypoglycemia: No  required assistance: No  required glucagon: No  CVA: No  heart disease: No  nephropathy: No  peripheral neuropathy: No  PVD: No  retinopathy: No  autonomic neuropathy: No  CAD risks: no known risk factors  Current treatments: oral agent (monotherapy)  Treatment compliance: most of the time  Dose schedule: at bedtime  Given by: patient  Injection sites: abdominal wall  Home blood tests: 1-2 x per week  Home urines: <1 x per month  Monitoring compliance: inadequate  Blood glucose trend: no change  Weight trend: stable  Current diet: generally healthy  Meal planning: none  Exercise: every other day  Dietitian visit: No  Eye exam current: No  Sees podiatrist: No

## 2024-04-08 ENCOUNTER — TELEPHONE (OUTPATIENT)
Dept: FAMILY MEDICINE | Facility: CLINIC | Age: 69
End: 2024-04-08
Payer: MEDICARE

## 2024-04-08 DIAGNOSIS — E11.65 TYPE 2 DIABETES MELLITUS WITH HYPERGLYCEMIA, WITHOUT LONG-TERM CURRENT USE OF INSULIN: ICD-10-CM

## 2024-04-08 RX ORDER — SEMAGLUTIDE 2.68 MG/ML
2 INJECTION, SOLUTION SUBCUTANEOUS
Qty: 3 ML | Refills: 2 | Status: SHIPPED | OUTPATIENT
Start: 2024-04-08 | End: 2024-06-03

## 2024-04-08 NOTE — TELEPHONE ENCOUNTER
----- Message from Sonya Vela MD sent at 4/8/2024  2:16 PM CDT -----  Regarding: RE: medical advice  Thirty day Rx of Ozempic 2 mg sent to the pharmacy  ----- Message -----  From: Nirali Huddleston LPN  Sent: 4/8/2024   2:14 PM CDT  To: Sonya Vela MD  Subject: FW: medical advice                                 ----- Message -----  From: Namita Zuluaga  Sent: 4/8/2024  12:42 PM CDT  To: Mirian Hassan Staff  Subject: medical advice                                   Type:  Needs Medical Advice    Who Called: Rosetta    Symptoms (please be specific):      How long has patient had these symptoms:      Pharmacy name and phone #:      Would the patient rather a call back or a response via [facility]? Call back     Best Call Back Number: [phone]    Additional Information: doctor send 90 day prescription and pt states its too expensive and would like doctor to send 30 day prescription semaglutide (OZEMPIC) 2 mg/dose (8 mg/3 mL) PnIj. Pt would like office to leave message when the prescription is sent

## 2024-05-24 DIAGNOSIS — F41.8 DEPRESSION WITH ANXIETY: ICD-10-CM

## 2024-05-24 RX ORDER — BUSPIRONE HYDROCHLORIDE 10 MG/1
10 TABLET ORAL 3 TIMES DAILY
Qty: 90 TABLET | Refills: 2 | Status: SHIPPED | OUTPATIENT
Start: 2024-05-24 | End: 2024-08-22

## 2024-05-28 DIAGNOSIS — E11.65 TYPE 2 DIABETES MELLITUS WITH HYPERGLYCEMIA, WITHOUT LONG-TERM CURRENT USE OF INSULIN: Primary | ICD-10-CM

## 2024-05-28 DIAGNOSIS — E11.9 TYPE 2 DIABETES MELLITUS WITHOUT COMPLICATION, WITHOUT LONG-TERM CURRENT USE OF INSULIN: ICD-10-CM

## 2024-05-28 RX ORDER — SEMAGLUTIDE 1.34 MG/ML
1 INJECTION, SOLUTION SUBCUTANEOUS
Qty: 1 EACH | Refills: 1 | Status: SHIPPED | OUTPATIENT
Start: 2024-05-28 | End: 2024-06-03

## 2024-05-28 RX ORDER — SEMAGLUTIDE 1.34 MG/ML
1 INJECTION, SOLUTION SUBCUTANEOUS
COMMUNITY
End: 2024-05-28 | Stop reason: SDUPTHER

## 2024-05-28 RX ORDER — METFORMIN HYDROCHLORIDE 500 MG/1
500 TABLET, EXTENDED RELEASE ORAL 2 TIMES DAILY
Qty: 180 TABLET | Refills: 0 | Status: SHIPPED | OUTPATIENT
Start: 2024-05-28 | End: 2024-08-26

## 2024-05-28 NOTE — TELEPHONE ENCOUNTER
----- Message from Sonya Vela MD sent at 5/28/2024  4:21 PM CDT -----  Regarding: RE: Ozempic  If she can not afford that at next Pap will be 1 mg Ozempic.  If she is okay in switching to mg of Ozempic to 1 mg as Ozempic then please propose for 3 months.  Thank you  ----- Message -----  From: Krysta Lozano LPN  Sent: 5/28/2024  11:50 AM CDT  To: Sonya Vela MD  Subject: Ozempic                                          Please advise on med change Thanks  ----- Message -----  From: Skyler Turcios  Sent: 5/28/2024  11:36 AM CDT  To: Mirian Hassan Staff    .Type:  RX Refill Request    Who Called: Rosetta  Refill or New Rx: Refill   RX Name and Strength: semaglutide (OZEMPIC) 2 mg/dose (8 mg/3 mL) PnIj  she needs the 1.5 mg as the other is too expensive   How is the patient currently taking it? (ex. 1XDay):  Is this a 30 day or 90 day RX:  Preferred Pharmacy with phone number: [facility]   Local or Mail Order: Local   Ordering Provider: Maya  Would the patient rather a call back or a response via [facility]?   Best Call Back Number:[phone]  Additional Information: Please call back with any questions. Also is there is samples of the 2.0 it would be appreciated.

## 2024-05-29 DIAGNOSIS — I10 BENIGN ESSENTIAL HTN: ICD-10-CM

## 2024-05-29 RX ORDER — LISINOPRIL AND HYDROCHLOROTHIAZIDE 12.5; 2 MG/1; MG/1
1 TABLET ORAL DAILY
Qty: 90 TABLET | Refills: 0 | Status: SHIPPED | OUTPATIENT
Start: 2024-05-29 | End: 2024-08-27

## 2024-06-03 DIAGNOSIS — E11.65 TYPE 2 DIABETES MELLITUS WITH HYPERGLYCEMIA, WITHOUT LONG-TERM CURRENT USE OF INSULIN: ICD-10-CM

## 2024-06-03 RX ORDER — SEMAGLUTIDE 1.34 MG/ML
1 INJECTION, SOLUTION SUBCUTANEOUS
Qty: 1 EACH | Refills: 1 | Status: SHIPPED | OUTPATIENT
Start: 2024-06-03

## 2024-06-28 ENCOUNTER — LAB VISIT (OUTPATIENT)
Dept: LAB | Facility: HOSPITAL | Age: 69
End: 2024-06-28
Attending: STUDENT IN AN ORGANIZED HEALTH CARE EDUCATION/TRAINING PROGRAM
Payer: MEDICARE

## 2024-06-28 DIAGNOSIS — E11.9 TYPE 2 DIABETES MELLITUS WITHOUT COMPLICATION, WITHOUT LONG-TERM CURRENT USE OF INSULIN: ICD-10-CM

## 2024-06-28 LAB
EST. AVERAGE GLUCOSE BLD GHB EST-MCNC: 134.1 MG/DL
HBA1C MFR BLD: 6.3 %

## 2024-06-28 PROCEDURE — 36415 COLL VENOUS BLD VENIPUNCTURE: CPT

## 2024-06-28 PROCEDURE — 83036 HEMOGLOBIN GLYCOSYLATED A1C: CPT

## 2024-07-01 NOTE — PROGRESS NOTES
Please inform patient of lab results.    1. A1c at goal but elevated than before next (5.8--> 6.3 today)    2. Continue current regimen with 35 minutes of brisk walking and Mediterranean diet    3. Please schedule the patient for diabetes mellitus management on 09/01/2024    Thanks,    Dr. Vela

## 2024-07-03 ENCOUNTER — OFFICE VISIT (OUTPATIENT)
Dept: FAMILY MEDICINE | Facility: CLINIC | Age: 69
End: 2024-07-03
Payer: MEDICARE

## 2024-07-03 DIAGNOSIS — F41.1 GAD (GENERALIZED ANXIETY DISORDER): ICD-10-CM

## 2024-07-03 DIAGNOSIS — E78.2 MIXED HYPERLIPIDEMIA: ICD-10-CM

## 2024-07-03 DIAGNOSIS — E11.65 TYPE 2 DIABETES MELLITUS WITH HYPERGLYCEMIA, WITHOUT LONG-TERM CURRENT USE OF INSULIN: ICD-10-CM

## 2024-07-03 DIAGNOSIS — Z63.6 CAREGIVER BURDEN: ICD-10-CM

## 2024-07-03 DIAGNOSIS — F33.0 MILD EPISODE OF RECURRENT MAJOR DEPRESSIVE DISORDER: ICD-10-CM

## 2024-07-03 DIAGNOSIS — K21.9 GASTROESOPHAGEAL REFLUX DISEASE WITHOUT ESOPHAGITIS: ICD-10-CM

## 2024-07-03 RX ORDER — ATORVASTATIN CALCIUM 40 MG/1
40 TABLET, FILM COATED ORAL DAILY
Qty: 90 TABLET | Refills: 3 | Status: SHIPPED | OUTPATIENT
Start: 2024-07-03 | End: 2025-07-03

## 2024-07-03 RX ORDER — PANTOPRAZOLE SODIUM 40 MG/1
40 TABLET, DELAYED RELEASE ORAL DAILY
Qty: 90 TABLET | Refills: 0 | Status: SHIPPED | OUTPATIENT
Start: 2024-07-03 | End: 2024-10-01

## 2024-07-03 RX ORDER — BUSPIRONE HYDROCHLORIDE 15 MG/1
15 TABLET ORAL 3 TIMES DAILY
Qty: 90 TABLET | Refills: 11 | Status: SHIPPED | OUTPATIENT
Start: 2024-07-03 | End: 2025-07-03

## 2024-07-03 RX ORDER — VILAZODONE HYDROCHLORIDE 40 MG/1
40 TABLET ORAL DAILY
Qty: 90 TABLET | Refills: 3 | Status: SHIPPED | OUTPATIENT
Start: 2024-07-03 | End: 2025-07-03

## 2024-07-03 SDOH — SOCIAL DETERMINANTS OF HEALTH (SDOH): DEPENDENT RELATIVE NEEDING CARE AT HOME: Z63.6

## 2024-07-03 NOTE — PROGRESS NOTES
Patient ID: [id number]     Chief Complaint: No chief complaint on file.    HPI:     This is a telemedicine note. Patient was treated using telemedicine, real time audio and video, according to [facility] protocols. I, Sonya Vela MD, conducted the visit from the [facility]. The patient participated in the visit at a non-[facility] location selected by the patient, identified below. I am licensed in the state where the patient stated they are located. The patient stated that they understood and accepted the privacy and security risks to their information at their location. This visit is not recorded.    Patient was located at the patient's home.       Rosetta Loving is a 68 y.o. female here today for a telemedicine visit.     Depression  Stable on vilazodone 40 mg but wants to increase buspirone as she has a caregiver to her  who is in hospice   Reports of caregiver fatigue  Reports it to be ineffective and would like to switch medications   Has tried venlafaxine, Lexapro, Zoloft and Effexor in past     Obesity  DM             Hemoglobin A1c   Date Value Ref Range Status   09/25/2023 6.7 <=7.0 % Final   06/04/2021 6.2 <<=7.0 % Final   01/14/2021 6.1 <<=7.0 % Final   Body mass index is 36.93 kg/m².  Unable to afford Ozempic  Denies of any history/family history of thyroid, parathyroid, pancreatic, MEN in tumor     Hyperlipidemia   On low-dose of statins    Acid reflux   Requesting refills for Protonix  Denies of dysphagia or weight loss  -------------------------------------    Diabetes mellitus, type 2    Hypertension        Past Surgical History:   Procedure Laterality Date    APPENDECTOMY      Arm repair Left 10/09/2023    CHOLECYSTECTOMY  1994    Yes    COLONOSCOPY  1954    Dr. Elie Pickett    GALLBLADDER SURGERY      HIP SURGERY      HYSTERECTOMY      KNEE SURGERY      TONSILLECTOMY         Review of patient's allergies indicates:  No Known Allergies    Current Outpatient  Medications   Medication Instructions    busPIRone (BUSPAR) 10 mg, Oral, 3 times daily    diclofenac sodium (SOLARAZE) 3 % gel 1 application , Topical (Top), 2 times daily    lisinopriL-hydrochlorothiazide (PRINZIDE,ZESTORETIC) 20-12.5 mg per tablet 1 tablet, Oral, Daily    metFORMIN (GLUCOPHAGE-XR) 500 mg, Oral, 2 times daily    OZEMPIC 1 mg, Subcutaneous, Every 7 days    pantoprazole (PROTONIX) 40 mg, Oral, Daily    pravastatin (PRAVACHOL) 20 mg, Oral, Daily    vilazodone (VIIBRYD) 40 mg, Oral, Daily       Social History     Socioeconomic History    Marital status:    Tobacco Use    Smoking status: Never     Passive exposure: Never    Smokeless tobacco: Never    Tobacco comments:     Never   Substance and Sexual Activity    Alcohol use: Yes     Alcohol/week: 1.0 standard drink of alcohol     Types: 1 Glasses of wine per week     Comment: Social    Drug use: Never    Sexual activity: Not Currently     Partners: Male     Birth control/protection: Abstinence     Comment: Abstinence     Social Determinants of Health     Financial Resource Strain: High Risk (2/17/2024)    Overall Financial Resource Strain (CARDIA)     Difficulty of Paying Living Expenses: Hard   Food Insecurity: Food Insecurity Present (2/17/2024)    Hunger Vital Sign     Worried About Running Out of Food in the Last Year: Sometimes true     Ran Out of Food in the Last Year: Sometimes true   Transportation Needs: No Transportation Needs (2/17/2024)    PRAPARE - Transportation     Lack of Transportation (Medical): No     Lack of Transportation (Non-Medical): No   Physical Activity: Insufficiently Active (2/17/2024)    Exercise Vital Sign     Days of Exercise per Week: 2 days     Minutes of Exercise per Session: 30 min   Stress: Stress Concern Present (2/17/2024)    [employer] [place] [employer] - Occupational Stress Questionnaire     Feeling of Stress : Rather much   Housing Stability: Low Risk  (2/17/2024)    Housing Stability Vital  Sign     Unable to Pay for Housing in the Last Year: No     Number of Places Lived in the Last Year: 1     Unstable Housing in the Last Year: No        Family History   Problem Relation Name Age of Onset    Arthritis Mother Sara Simmons         Yes    Cancer Mother Sara Simmons         Breast    Depression Mother Sara Simmons         Yes    Hypertension Mother Sara Simmons         Yes    Mental illness Mother Sara Simmons         Yes    Diabetes Father ROM Simmons         Yes        Patient Care Team:  Sonya Vela MD as PCP - General (Family Medicine)      Subjective:       ROS    See HPI for details    Constitutional: Denies Change in appetite. Denies Chills. Denies Fever. Denies Night sweats.  Eye: Denies Blurred vision. Denies Discharge. Denies Eye pain.  ENT: Denies Decreased hearing. Denies Sore throat. Denies Swollen glands.  Respiratory: Denies Cough. Denies Shortness of breath. Denies Shortness of breath with exertion. Denies Wheezing.  Cardiovascular: DeniesChest pain at rest. Denies Chest pain with exertion. Denies Irregular heartbeat. Denies Palpitations. Denies Edema.  Gastrointestinal: Denies Abdominal pain. DeniesDiarrhea. Denies Nausea. Denies Vomiting. Denies Hematemesis or Hematochezia.  Genitourinary: Denies Dysuria. Denies Urinary frequency. Denies Urinary urgency. Denies Blood in urine.  Endocrine: Denies Cold intolerance. Denies Excessive thirst. Denies Heat intolerance. Denies Weight loss. Denies Weight gain.  Musculoskeletal: Denies Painful joints. Denies Weakness.  Integumentary: Denies Rash. Denies Itching. Denies Dry skin.  Neurologic: Denies Dizziness. Denies Fainting. Denies Headache.  Psychiatric: Denies Depression. Denies Anxiety. Denies Suicidal/Homicidal ideations.    All Other ROS: Negative except as stated in HPI.       Objective:     There were no vitals taken for this visit.    Physical Exam    Physical Exam: LIMITED DUE TO TELEMEDICINE RESTRICTIONS.  General: Alert and  oriented, No acute distress.  Head: Normocephalic, Atraumatic.  Eye: Sclera non-icteric.  Neck/Thyroid:  Full range of motion.  Respiratory: Non-labored respirations, Symmetrical chest wall expansion.  Musculoskeletal: Normal range of motion.  Integumentary:  No visible suspicious lesions or rashes. No diaphoresis.   Neurologic: No focal deficits  Psychiatric: Normal interaction, Coherent speech, Euthymic mood, Appropriate affect       Assessment:       ICD-10-CM ICD-9-CM   1. Type 2 diabetes mellitus with hyperglycemia, without long-term current use of insulin  E11.65 250.00     790.29   2. Mild episode of recurrent major depressive disorder  F33.0 296.31   3. SWATHI (generalized anxiety disorder)  F41.1 300.02   4. Caregiver burden  Z63.6 V61.49   5. Gastroesophageal reflux disease without esophagitis  K21.9 530.81   6. Mixed hyperlipidemia  E78.2 272.2        Plan:     1. Type 2 diabetes mellitus with hyperglycemia, without long-term current use of insulin  Discontinue Ozempic as patient can not afford it   Start Glyxambi  -     empagliflozin-linagliptin (GLYXAMBI) 25-5 mg Tab; Take 1 tablet by mouth Daily.  Dispense: 90 tablet; Refill: 0  -     Diabetic Eye Screening Photo; Future; Expected date: 08/30/2024  -     Hemoglobin A1C; Future; Expected date: 10/03/2024    2. Mild episode of recurrent major depressive disorder  3. SWATHI (generalized anxiety disorder)  4. Caregiver burden  Continue vilazodone 40 mg as prescribed   Increase buspirone   Recommend to start mental health exercises like yoga     -     vilazodone (VIIBRYD) 40 mg Tab tablet; Take 1 tablet (40 mg total) by mouth once daily.  Dispense: 90 tablet; Refill: 3  -     busPIRone (BUSPAR) 15 MG tablet; Take 1 tablet (15 mg total) by mouth 3 (three) times daily.  Dispense: 90 tablet; Refill: 11      5. Gastroesophageal reflux disease without esophagitis  -     pantoprazole (PROTONIX) 40 MG tablet; Take 1 tablet (40 mg total) by mouth once daily.  Dispense:  90 tablet; Refill: 0  Lifestyle modification recommended   Lose 10% of body weight   Avoid fried fatty food   Last meal of the day recommended no later than 7:00 p.m.    6. Mixed hyperlipidemia  Discontinue pravastatin   Start atorvastatin  -     atorvastatin (LIPITOR) 40 MG tablet; Take 1 tablet (40 mg total) by mouth once daily.  Dispense: 90 tablet; Refill: 3           Medication List with Changes/Refills   Current Medications    BUSPIRONE (BUSPAR) 10 MG TABLET    Take 1 tablet (10 mg total) by mouth 3 (three) times daily.       Start Date: 5/24/2024 End Date: 8/22/2024    DICLOFENAC SODIUM (SOLARAZE) 3 % GEL    Apply 1 application  topically 2 (two) times daily.       Start Date: 7/17/2023 End Date: --    LISINOPRIL-HYDROCHLOROTHIAZIDE (PRINZIDE,ZESTORETIC) 20-12.5 MG PER TABLET    Take 1 tablet by mouth once daily.       Start Date: 5/29/2024 End Date: 8/27/2024    METFORMIN (GLUCOPHAGE-XR) 500 MG ER 24HR TABLET    Take 1 tablet (500 mg total) by mouth 2 (two) times daily.       Start Date: 5/28/2024 End Date: 8/26/2024    PANTOPRAZOLE (PROTONIX) 40 MG TABLET    Take 1 tablet (40 mg total) by mouth once daily.       Start Date: 2/20/2024 End Date: 5/20/2024    PRAVASTATIN (PRAVACHOL) 20 MG TABLET    Take 1 tablet (20 mg total) by mouth once daily.       Start Date: 1/12/2024 End Date: --    SEMAGLUTIDE (OZEMPIC) 1 MG/DOSE (4 MG/3 ML)    Inject 1 mg into the skin every 7 days.       Start Date: 6/3/2024  End Date: --    VILAZODONE (VIIBRYD) 40 MG TAB TABLET    Take 1 tablet (40 mg total) by mouth once daily.       Start Date: 4/3/2024  End Date: 4/3/2025          Follow up for Nurse Visit on 8/30/24 for eye exam. Follow up for DM after 10/5/24. In addition to their scheduled follow up, the patient has also been instructed to follow up on as needed basis.     No future appointments.    Answers submitted by the patient for this visit:  Review of Systems Questionnaire (Submitted on 7/3/2024)  activity change:  No  unexpected weight change: No  rhinorrhea: No  trouble swallowing: No  visual disturbance: No  chest tightness: No  polyuria: No  difficulty urinating: No  menstrual problem: No  joint swelling: No  arthralgias: No  confusion: No  dysphoric mood: No

## 2024-08-05 DIAGNOSIS — F41.8 DEPRESSION WITH ANXIETY: ICD-10-CM

## 2024-08-05 DIAGNOSIS — F33.0 MILD EPISODE OF RECURRENT MAJOR DEPRESSIVE DISORDER: ICD-10-CM

## 2024-08-05 DIAGNOSIS — F41.8 DEPRESSION WITH ANXIETY: Primary | ICD-10-CM

## 2024-08-05 RX ORDER — TRAZODONE HYDROCHLORIDE 50 MG/1
50 TABLET ORAL NIGHTLY
Qty: 30 TABLET | Refills: 11 | Status: SHIPPED | OUTPATIENT
Start: 2024-08-05 | End: 2024-08-05 | Stop reason: SDUPTHER

## 2024-08-05 RX ORDER — TRAZODONE HYDROCHLORIDE 50 MG/1
50 TABLET ORAL NIGHTLY
Qty: 30 TABLET | Refills: 11 | Status: SHIPPED | OUTPATIENT
Start: 2024-08-05 | End: 2025-08-05

## 2024-08-06 RX ORDER — BUSPIRONE HYDROCHLORIDE 15 MG/1
15 TABLET ORAL 3 TIMES DAILY
Qty: 90 TABLET | Refills: 11 | Status: SHIPPED | OUTPATIENT
Start: 2024-08-06 | End: 2025-08-06

## 2024-09-17 ENCOUNTER — TELEPHONE (OUTPATIENT)
Dept: FAMILY MEDICINE | Facility: CLINIC | Age: 69
End: 2024-09-17
Payer: MEDICARE

## 2024-09-17 NOTE — TELEPHONE ENCOUNTER
----- Message from Wendy Guillaume sent at 9/17/2024 10:18 AM CDT -----  Regarding: advice  Who Called: Rosetta Loving    Caller is requesting assistance/information from provider's office.    Symptoms (please be specific):    How long has patient had these symptoms:    List of preferred pharmacies on file (remove unneeded): [unfilled]  If different, enter pharmacy into here including location and phone number:       Preferred Method of Contact: Phone Call  Patient's Preferred Phone Number on File: [phone]   Best Call Back Number, if different:  Additional Information: stated that she called yesterday afternoon about needing meds for anxiety. Stated that she received a call but missed that call but called right back. Stated that her anxiety and getting severe and is needing to speak to nurse about have meds sent to the pharmacy. Please advise

## 2024-09-18 ENCOUNTER — OFFICE VISIT (OUTPATIENT)
Dept: FAMILY MEDICINE | Facility: CLINIC | Age: 69
End: 2024-09-18
Payer: MEDICARE

## 2024-09-18 VITALS
TEMPERATURE: 99 F | BODY MASS INDEX: 32.99 KG/M2 | WEIGHT: 198 LBS | HEART RATE: 112 BPM | RESPIRATION RATE: 18 BRPM | DIASTOLIC BLOOD PRESSURE: 80 MMHG | OXYGEN SATURATION: 97 % | SYSTOLIC BLOOD PRESSURE: 121 MMHG | HEIGHT: 65 IN

## 2024-09-18 DIAGNOSIS — F41.1 GAD (GENERALIZED ANXIETY DISORDER): ICD-10-CM

## 2024-09-18 DIAGNOSIS — E78.2 MIXED HYPERLIPIDEMIA: ICD-10-CM

## 2024-09-18 DIAGNOSIS — Z13.29 SCREENING FOR THYROID DISORDER: ICD-10-CM

## 2024-09-18 DIAGNOSIS — Z13.31 POSITIVE DEPRESSION SCREENING: ICD-10-CM

## 2024-09-18 DIAGNOSIS — E11.65 TYPE 2 DIABETES MELLITUS WITH HYPERGLYCEMIA, WITHOUT LONG-TERM CURRENT USE OF INSULIN: ICD-10-CM

## 2024-09-18 DIAGNOSIS — I10 BENIGN ESSENTIAL HTN: ICD-10-CM

## 2024-09-18 DIAGNOSIS — Z23 NEED FOR VACCINATION FOR STREP PNEUMONIAE: ICD-10-CM

## 2024-09-18 DIAGNOSIS — F33.0 MILD EPISODE OF RECURRENT MAJOR DEPRESSIVE DISORDER: ICD-10-CM

## 2024-09-18 DIAGNOSIS — E66.09 CLASS 1 OBESITY DUE TO EXCESS CALORIES WITH SERIOUS COMORBIDITY AND BODY MASS INDEX (BMI) OF 33.0 TO 33.9 IN ADULT: ICD-10-CM

## 2024-09-18 DIAGNOSIS — Z23 NEEDS FLU SHOT: ICD-10-CM

## 2024-09-18 DIAGNOSIS — Z11.59 ENCOUNTER FOR HEPATITIS C SCREENING TEST FOR LOW RISK PATIENT: ICD-10-CM

## 2024-09-18 DIAGNOSIS — F43.21 GRIEF: ICD-10-CM

## 2024-09-18 PROCEDURE — 3044F HG A1C LEVEL LT 7.0%: CPT | Mod: CPTII,,, | Performed by: STUDENT IN AN ORGANIZED HEALTH CARE EDUCATION/TRAINING PROGRAM

## 2024-09-18 PROCEDURE — 3008F BODY MASS INDEX DOCD: CPT | Mod: CPTII,,, | Performed by: STUDENT IN AN ORGANIZED HEALTH CARE EDUCATION/TRAINING PROGRAM

## 2024-09-18 PROCEDURE — 3288F FALL RISK ASSESSMENT DOCD: CPT | Mod: CPTII,,, | Performed by: STUDENT IN AN ORGANIZED HEALTH CARE EDUCATION/TRAINING PROGRAM

## 2024-09-18 PROCEDURE — 90677 PCV20 VACCINE IM: CPT | Mod: ,,, | Performed by: STUDENT IN AN ORGANIZED HEALTH CARE EDUCATION/TRAINING PROGRAM

## 2024-09-18 PROCEDURE — G0009 ADMIN PNEUMOCOCCAL VACCINE: HCPCS | Mod: ,,, | Performed by: STUDENT IN AN ORGANIZED HEALTH CARE EDUCATION/TRAINING PROGRAM

## 2024-09-18 PROCEDURE — 3079F DIAST BP 80-89 MM HG: CPT | Mod: CPTII,,, | Performed by: STUDENT IN AN ORGANIZED HEALTH CARE EDUCATION/TRAINING PROGRAM

## 2024-09-18 PROCEDURE — 1159F MED LIST DOCD IN RCRD: CPT | Mod: CPTII,,, | Performed by: STUDENT IN AN ORGANIZED HEALTH CARE EDUCATION/TRAINING PROGRAM

## 2024-09-18 PROCEDURE — 1160F RVW MEDS BY RX/DR IN RCRD: CPT | Mod: CPTII,,, | Performed by: STUDENT IN AN ORGANIZED HEALTH CARE EDUCATION/TRAINING PROGRAM

## 2024-09-18 PROCEDURE — 1100F PTFALLS ASSESS-DOCD GE2>/YR: CPT | Mod: CPTII,,, | Performed by: STUDENT IN AN ORGANIZED HEALTH CARE EDUCATION/TRAINING PROGRAM

## 2024-09-18 PROCEDURE — 99214 OFFICE O/P EST MOD 30 MIN: CPT | Mod: 25,,, | Performed by: STUDENT IN AN ORGANIZED HEALTH CARE EDUCATION/TRAINING PROGRAM

## 2024-09-18 PROCEDURE — 1125F AMNT PAIN NOTED PAIN PRSNT: CPT | Mod: CPTII,,, | Performed by: STUDENT IN AN ORGANIZED HEALTH CARE EDUCATION/TRAINING PROGRAM

## 2024-09-18 PROCEDURE — 4010F ACE/ARB THERAPY RXD/TAKEN: CPT | Mod: CPTII,,, | Performed by: STUDENT IN AN ORGANIZED HEALTH CARE EDUCATION/TRAINING PROGRAM

## 2024-09-18 PROCEDURE — 3074F SYST BP LT 130 MM HG: CPT | Mod: CPTII,,, | Performed by: STUDENT IN AN ORGANIZED HEALTH CARE EDUCATION/TRAINING PROGRAM

## 2024-09-18 RX ORDER — PRAVASTATIN SODIUM 40 MG/1
40 TABLET ORAL DAILY
Qty: 90 TABLET | Refills: 3 | Status: SHIPPED | OUTPATIENT
Start: 2024-09-18 | End: 2025-09-18

## 2024-09-18 RX ORDER — LISINOPRIL AND HYDROCHLOROTHIAZIDE 12.5; 2 MG/1; MG/1
1 TABLET ORAL DAILY
Qty: 90 TABLET | Refills: 0 | Status: SHIPPED | OUTPATIENT
Start: 2024-09-18 | End: 2024-12-17

## 2024-09-18 RX ORDER — ALPRAZOLAM 0.25 MG/1
0.25 TABLET ORAL DAILY PRN
Qty: 30 TABLET | Refills: 0 | Status: SHIPPED | OUTPATIENT
Start: 2024-09-18 | End: 2024-10-18

## 2024-09-18 NOTE — PROGRESS NOTES
Patient ID: [id number]     Chief Complaint: Follow-up and Anxiety        HPI:      Disclaimer:  This note is prepared using voice recognition software and as such is likely to have errors despite attempts at proofreading. Please contact me for questions.     Rosetta Loving is a 68 y.o. female here today for the following    Over all doing good. No new concerns    Is here to discuss the following issues. And Is to establish care    Acute Issues-  Depression   PHQ-9 score today is 4   Patient recently lost her  and is grieving.  Would like to get referral to grief management/start some medications for anxiety and depression     Diabetes mellitus   Protective Sensation (w/ 10 gram monofilament):  Right: Intact  Left: Intact    Visual Inspection:  Callus -  Bilateral    Pedal Pulses:   Right: Present  Left: Present    Posterior Tibialis Pulses:   Right:Present  Left: Present      Hypertension   Well-controlled   Asymptomatic    Tachycardic   Asymptomatic but tearfull  Chronic Issues-    -------------------------------------    Diabetes mellitus, type 2    Hypertension        Past Surgical History:   Procedure Laterality Date    APPENDECTOMY      Arm repair Left 10/09/2023    CHOLECYSTECTOMY  1994    Yes    COLONOSCOPY  1954    Dr. Elie Pickett    GALLBLADDER SURGERY      HIP SURGERY      HYSTERECTOMY      KNEE SURGERY      TONSILLECTOMY         Review of patient's allergies indicates:  No Known Allergies    Current Outpatient Medications   Medication Instructions    ALPRAZolam (XANAX) 0.25 mg, Oral, Daily PRN    busPIRone (BUSPAR) 15 mg, Oral, 3 times daily    diclofenac sodium (SOLARAZE) 3 % gel 1 application , Topical (Top), 2 times daily    empagliflozin-linagliptin (GLYXAMBI) 25-5 mg Tab 1 tablet, Oral, Daily    lisinopriL-hydrochlorothiazide (PRINZIDE,ZESTORETIC) 20-12.5 mg per tablet 1 tablet, Oral, Daily    metFORMIN (GLUCOPHAGE-XR) 500 mg, Oral, 2 times daily    pantoprazole (PROTONIX)  40 mg, Oral, Daily    pravastatin (PRAVACHOL) 40 mg, Oral, Daily    traZODone (DESYREL) 50 mg, Oral, Nightly       Social History     Socioeconomic History    Marital status:    Tobacco Use    Smoking status: Never     Passive exposure: Never    Smokeless tobacco: Never    Tobacco comments:     Never   Substance and Sexual Activity    Alcohol use: Yes     Alcohol/week: 1.0 standard drink of alcohol     Types: 1 Glasses of wine per week     Comment: Social    Drug use: Never    Sexual activity: Not Currently     Partners: Male     Birth control/protection: Abstinence     Comment: Abstinence     Social Determinants of Health     Financial Resource Strain: High Risk (2/17/2024)    Overall Financial Resource Strain (CARDIA)     Difficulty of Paying Living Expenses: Hard   Food Insecurity: Food Insecurity Present (2/17/2024)    Hunger Vital Sign     Worried About Running Out of Food in the Last Year: Sometimes true     Ran Out of Food in the Last Year: Sometimes true   Transportation Needs: No Transportation Needs (2/17/2024)    PRAPARE - Transportation     Lack of Transportation (Medical): No     Lack of Transportation (Non-Medical): No   Physical Activity: Insufficiently Active (2/17/2024)    Exercise Vital Sign     Days of Exercise per Week: 2 days     Minutes of Exercise per Session: 30 min   Stress: Stress Concern Present (2/17/2024)    [employer] [place] [employer] - Occupational Stress Questionnaire     Feeling of Stress : Rather much   Housing Stability: Low Risk  (2/17/2024)    Housing Stability Vital Sign     Unable to Pay for Housing in the Last Year: No     Number of Places Lived in the Last Year: 1     Unstable Housing in the Last Year: No        Family History   Problem Relation Name Age of Onset    Arthritis Mother Sara Simmons         Yes    Cancer Mother Sara Simmons         Breast    Depression Mother Sara Simmons         Yes    Hypertension Mother Sara Simmons         Yes     "Mental illness Mother Sara Simmons         Yes    Diabetes Father ROM Simmons         Yes        Patient Care Team:  Sonya Vela MD as PCP - General (Family Medicine)     Subjective:     ROS    See HPI for details    Constitutional: Denies Change in appetite. Denies Chills. Denies Fever. Denies Night sweats.  Respiratory: Denies Cough. Denies Shortness of breath. Denies Shortness of breath with exertion. Denies Wheezing.  Cardiovascular: DeniesChest pain at rest. Denies Chest pain with exertion. Denies Irregular heartbeat. Denies Palpitations. Denies Edema.  Gastrointestinal: Denies Abdominal pain. DeniesDiarrhea. Denies Nausea. Denies Vomiting. Denies Hematemesis or Hematochezia.  Genitourinary: Denies Dysuria. Denies Urinary frequency. Denies Urinary urgency. Denies Blood in urine.  Endocrine: Denies Cold intolerance. Denies Excessive thirst. Denies Heat intolerance. Denies Weight loss. Denies Weight gain.  Musculoskeletal: Denies Painful joints. Denies Weakness.  Integumentary: Denies Rash. Denies Itching. Denies Dry skin.  Neurologic: Denies Dizziness. Denies Fainting. Denies Headache.  Psychiatric: Denies Depression. Denies Anxiety. Denies Suicidal/Homicidal ideations.    All Other ROS: Negative except as stated in HPI.  Objective:     Visit Vitals  /80 (BP Location: Right arm, Patient Position: Sitting, BP Method: Medium (Automatic))   Pulse (!) 112   Temp 98.6 °F (37 °C) (Oral)   Resp 18   Ht 5' 4.57" (1.64 m)   Wt 89.8 kg (198 lb)   SpO2 97%   BMI 33.39 kg/m²       Physical Exam    General: Alert and oriented, Obese, No acute distress.  Respiratory: Clear to auscultation bilaterally; No wheezes, rales or rhonchi,Non-labored respirations, Symmetrical chest wall expansion.  Cardiovascular: Regular rate and rhythm, S1/S2 normal, No murmurs, rubs or gallops.  Gastrointestinal: Soft, Non-tender, Non-distended, Normal bowel sounds, No palpable organomegaly.  Musculoskeletal: Normal range of " motion.  Extremities: No clubbing, cyanosis or edema  Neurologic: No focal deficits  Psychiatric: Normal interaction, Coherent speech, Euthymic mood, Appropriate affect   Assessment:       ICD-10-CM ICD-9-CM   1. Mild episode of recurrent major depressive disorder  F33.0 296.31   2. Grief  F43.21 309.0   3. Positive depression screening  Z13.31 796.4   4. SWATHI (generalized anxiety disorder)  F41.1 300.02   5. Type 2 diabetes mellitus with hyperglycemia, without long-term current use of insulin  E11.65 250.00     790.29   6. Mixed hyperlipidemia  E78.2 272.2   7. Benign essential HTN  I10 401.1   8. Needs flu shot  Z23 V04.81   9. Encounter for hepatitis C screening test for low risk patient  Z11.59 V73.89   10. Screening for thyroid disorder  Z13.29 V77.0   11. Class 1 obesity due to excess calories with serious comorbidity and body mass index (BMI) of 33.0 to 33.9 in adult  E66.09 278.00    Z68.33 V85.33   12. Need for vaccination for Strep pneumoniae  Z23 V03.82        Plan:     1. Mild episode of recurrent major depressive disorder  2. Grief  -     Ambulatory referral/consult to Behavioral Health; Future; Expected date: 09/25/2024  3. Positive depression screening  4. SWATHI (generalized anxiety disorder)  Comments:  I have reviewed the positive depression score which warrants active treatment with psychotherapy and/or medications.  PHQ-9 score today is 4   Continue trazodone as prescribed   Start Xanax for p.r.n. anxiety/panic attacks   Grief counseling recommended.  Referral sent.   reviewed   Side effects of drug discussed in detail    5. Type 2 diabetes mellitus with hyperglycemia, without long-term current use of insulin  Continue 35 minutes of brisk walking with diabetic diet   Continue Rx as prescribed  -     Diabetic Eye Screening Photo; Future  -     CBC Auto Differential; Future; Expected date: 09/18/2024  -     Comprehensive Metabolic Panel; Future; Expected date: 09/18/2024  -     Hemoglobin A1C;  Future; Expected date: 09/18/2024  -     Urinalysis; Future; Expected date: 09/18/2024  -     Microalbumin/Creatinine Ratio, Urine; Future; Expected date: 09/18/2024  -     Ambulatory referral/consult to Podiatry; Future; Expected date: 09/25/2024    6. Mixed hyperlipidemia  Wants to switch from atorvastatin to pravastatin because of muscle aches  -     Lipid Panel; Future; Expected date: 09/18/2024  -     pravastatin (PRAVACHOL) 40 MG tablet; Take 1 tablet (40 mg total) by mouth once daily.  Dispense: 90 tablet; Refill: 3    7. Benign essential HTN  Recommend to continue Rx as prescribed   Keep goal blood pressure less than 130/80  Continue 35 minutes of brisk walking, 5 days in a week  Continue low sodium Diet (DASH Diet - Less than 2 grams of sodium per day).  Recommend to quit smoking if smoking  Maintain healthy weight with goal BMI <25.    -     lisinopriL-hydrochlorothiazide (PRINZIDE,ZESTORETIC) 20-12.5 mg per tablet; Take 1 tablet by mouth once daily.  Dispense: 90 tablet; Refill: 0    8. Needs flu shot  -     Influenza - Quadrivalent (Adjuvanted)    9. Encounter for hepatitis C screening test for low risk patient  -     Hepatitis C Antibody; Future; Expected date: 09/18/2024    10. Screening for thyroid disorder  -     TSH; Future; Expected date: 09/18/2024    11. Class 1 obesity due to excess calories with serious comorbidity and body mass index (BMI) of 33.0 to 33.9 in adult  -     Vitamin D; Future; Expected date: 09/18/2024    12. Need for vaccination for Strep pneumoniae  -     Pneumococcal Conjugate Vaccine (20 Valent) (IM)(Preferred)    Other orders  -     ALPRAZolam (XANAX) 0.25 MG tablet; Take 1 tablet (0.25 mg total) by mouth daily as needed for Anxiety (Anxiety).  Dispense: 30 tablet; Refill: 0           Medication List with Changes/Refills   New Medications    ALPRAZOLAM (XANAX) 0.25 MG TABLET    Take 1 tablet (0.25 mg total) by mouth daily as needed for Anxiety (Anxiety).       Start Date:  9/18/2024 End Date: 10/18/2024    PRAVASTATIN (PRAVACHOL) 40 MG TABLET    Take 1 tablet (40 mg total) by mouth once daily.       Start Date: 9/18/2024 End Date: 9/18/2025   Current Medications    BUSPIRONE (BUSPAR) 15 MG TABLET    Take 1 tablet (15 mg total) by mouth 3 (three) times daily.       Start Date: 8/6/2024  End Date: 8/6/2025    DICLOFENAC SODIUM (SOLARAZE) 3 % GEL    Apply 1 application  topically 2 (two) times daily.       Start Date: 7/17/2023 End Date: --    EMPAGLIFLOZIN-LINAGLIPTIN (GLYXAMBI) 25-5 MG TAB    Take 1 tablet by mouth Daily.       Start Date: 7/3/2024  End Date: --    METFORMIN (GLUCOPHAGE-XR) 500 MG ER 24HR TABLET    Take 1 tablet (500 mg total) by mouth 2 (two) times daily.       Start Date: 5/28/2024 End Date: 9/18/2024    PANTOPRAZOLE (PROTONIX) 40 MG TABLET    Take 1 tablet (40 mg total) by mouth once daily.       Start Date: 7/3/2024  End Date: 10/1/2024    TRAZODONE (DESYREL) 50 MG TABLET    Take 1 tablet (50 mg total) by mouth every evening.       Start Date: 8/5/2024  End Date: 8/5/2025   Changed and/or Refilled Medications    Modified Medication Previous Medication    LISINOPRIL-HYDROCHLOROTHIAZIDE (PRINZIDE,ZESTORETIC) 20-12.5 MG PER TABLET lisinopriL-hydrochlorothiazide (PRINZIDE,ZESTORETIC) 20-12.5 mg per tablet       Take 1 tablet by mouth once daily.    Take 1 tablet by mouth once daily.       Start Date: 9/18/2024 End Date: 12/17/2024    Start Date: 5/29/2024 End Date: 9/18/2024   Discontinued Medications    ATORVASTATIN (LIPITOR) 40 MG TABLET    Take 1 tablet (40 mg total) by mouth once daily.       Start Date: 7/3/2024  End Date: 9/18/2024          Follow up in about 6 weeks (around 10/30/2024) for Follow Up, Medicare Wellness.   In addition to their scheduled follow up, the patient has also been instructed to follow up on as needed basis.     No future appointments.  I have used clinical judgement based on duration and functional status to consider definite necessity  for treatment. As above  I have reviewed the positive depression score which warrants active treatment with psychotherapy and/or medications. 9/18/24

## 2024-09-23 DIAGNOSIS — Z23 FLU VACCINE NEED: Primary | ICD-10-CM

## 2024-09-23 PROCEDURE — 90653 IIV ADJUVANT VACCINE IM: CPT | Mod: ,,, | Performed by: STUDENT IN AN ORGANIZED HEALTH CARE EDUCATION/TRAINING PROGRAM

## 2024-09-23 PROCEDURE — G0008 ADMIN INFLUENZA VIRUS VAC: HCPCS | Mod: ,,, | Performed by: STUDENT IN AN ORGANIZED HEALTH CARE EDUCATION/TRAINING PROGRAM

## 2024-09-30 ENCOUNTER — TELEPHONE (OUTPATIENT)
Dept: FAMILY MEDICINE | Facility: CLINIC | Age: 69
End: 2024-09-30
Payer: MEDICARE

## 2024-09-30 NOTE — TELEPHONE ENCOUNTER
----- Message from Jose Angulo sent at 9/30/2024 11:25 AM CDT -----  Regarding: referral  Who Called: Rosetta Loving    Does the patient already have the specialty appointment scheduled?:no  If yes, what is the date of that appointment?:  Referral to What Specialty: Psych or therapist  Reason for Referral:  Does the patient want the referral with a specific physician?:  If yes, which provider?:   Is the specialist an [facility]?:    Preferred Method of Contact: Phone Call  Patient's Preferred Phone Number on File: [phone]   Best Call Back Number, if different:    Additional Information:  Pt is requesting a psych or therapist referral so she can try to get in somewhere sooner. Pt did have a recent appt on 9/18, please advise.

## 2024-10-02 ENCOUNTER — TELEPHONE (OUTPATIENT)
Dept: FAMILY MEDICINE | Facility: CLINIC | Age: 69
End: 2024-10-02

## 2024-10-02 NOTE — TELEPHONE ENCOUNTER
----- Message from Nurse Martinez sent at 10/2/2024  2:47 PM CDT -----    ----- Message -----  From: Jeromy Barclay  Sent: 10/2/2024   2:21 PM CDT  To: [facility] Staff    Who Called: Rosetta Loving    Does the patient already have the specialty appointment scheduled?:no  If yes, what is the date of that appointment?:  Referral to What Specialty:  Reason for Referral:  Does the patient want the referral with a specific physician?:no  If yes, which provider?:   [facility]    Preferred Method of Contact: Phone Call  Patient's Preferred Phone Number on File: [phone]   Best Call Back Number, if different:  Additional Information: Cinthya CRAVEN/Dr.George Barrett [phone] ofc called to speak with nurse regarding pt referral and to see what it was for I called clinic no answer pls advise

## 2024-10-04 ENCOUNTER — TELEPHONE (OUTPATIENT)
Dept: FAMILY MEDICINE | Facility: CLINIC | Age: 69
End: 2024-10-04
Payer: MEDICARE

## 2024-10-04 NOTE — TELEPHONE ENCOUNTER
Pt voiced she is in a [facility]. And not sure if she make her appointment. Will keep us updated.

## 2024-10-04 NOTE — TELEPHONE ENCOUNTER
----- Message from Dominga sent at 10/4/2024 10:58 AM CDT -----  Who Called: Rosetta Loving    Patient is returning phone call    Who Left Message for Patient:[facility] staff  Does the patient know what this is regarding?: unk      Preferred Method of Contact: Phone Call  Patient's Preferred Phone Number on File: [phone]  theresa  Best Call Back Number, if different:  Additional Information: return call,  please advise, thanks

## 2024-10-11 ENCOUNTER — OFFICE VISIT (OUTPATIENT)
Dept: FAMILY MEDICINE | Facility: CLINIC | Age: 69
End: 2024-10-11
Payer: MEDICARE

## 2024-10-11 VITALS
BODY MASS INDEX: 33.12 KG/M2 | HEART RATE: 90 BPM | TEMPERATURE: 98 F | RESPIRATION RATE: 18 BRPM | HEIGHT: 64 IN | DIASTOLIC BLOOD PRESSURE: 81 MMHG | SYSTOLIC BLOOD PRESSURE: 131 MMHG | OXYGEN SATURATION: 96 % | WEIGHT: 194 LBS

## 2024-10-11 DIAGNOSIS — E66.09 CLASS 1 OBESITY DUE TO EXCESS CALORIES WITHOUT SERIOUS COMORBIDITY WITH BODY MASS INDEX (BMI) OF 33.0 TO 33.9 IN ADULT: ICD-10-CM

## 2024-10-11 DIAGNOSIS — Z13.29 SCREENING FOR THYROID DISORDER: ICD-10-CM

## 2024-10-11 DIAGNOSIS — E11.65 TYPE 2 DIABETES MELLITUS WITH HYPERGLYCEMIA, WITHOUT LONG-TERM CURRENT USE OF INSULIN: ICD-10-CM

## 2024-10-11 DIAGNOSIS — E78.2 MIXED HYPERLIPIDEMIA: ICD-10-CM

## 2024-10-11 DIAGNOSIS — F33.1 MODERATE EPISODE OF RECURRENT MAJOR DEPRESSIVE DISORDER: ICD-10-CM

## 2024-10-11 DIAGNOSIS — E55.9 VITAMIN D DEFICIENCY: ICD-10-CM

## 2024-10-11 DIAGNOSIS — F41.1 GAD (GENERALIZED ANXIETY DISORDER): ICD-10-CM

## 2024-10-11 DIAGNOSIS — F43.21 GRIEF: ICD-10-CM

## 2024-10-11 DIAGNOSIS — K59.03 DRUG-INDUCED CONSTIPATION: ICD-10-CM

## 2024-10-11 DIAGNOSIS — E66.811 CLASS 1 OBESITY DUE TO EXCESS CALORIES WITHOUT SERIOUS COMORBIDITY WITH BODY MASS INDEX (BMI) OF 33.0 TO 33.9 IN ADULT: ICD-10-CM

## 2024-10-11 RX ORDER — ESCITALOPRAM OXALATE 10 MG/1
10 TABLET ORAL DAILY
Qty: 90 TABLET | Refills: 0 | Status: SHIPPED | OUTPATIENT
Start: 2024-10-11 | End: 2025-01-09

## 2024-10-11 RX ORDER — POLYETHYLENE GLYCOL 3350 17 G/17G
17 POWDER, FOR SOLUTION ORAL DAILY
Qty: 30 PACKET | Refills: 0 | Status: SHIPPED | OUTPATIENT
Start: 2024-10-11 | End: 2024-11-10

## 2024-10-11 RX ORDER — ESCITALOPRAM OXALATE 5 MG/1
5 TABLET ORAL
COMMUNITY
Start: 2024-10-05 | End: 2024-10-11 | Stop reason: SDUPTHER

## 2024-10-11 RX ORDER — CLONAZEPAM 0.5 MG/1
0.5 TABLET ORAL 2 TIMES DAILY
COMMUNITY
Start: 2024-10-07

## 2024-10-11 RX ORDER — DICLOFENAC SODIUM 75 MG/1
75 TABLET, DELAYED RELEASE ORAL DAILY
COMMUNITY

## 2024-10-11 RX ORDER — ONDANSETRON 8 MG/1
8 TABLET, ORALLY DISINTEGRATING ORAL ONCE
COMMUNITY

## 2024-10-11 RX ORDER — PNV NO.95/FERROUS FUM/FOLIC AC 28MG-0.8MG
100 TABLET ORAL DAILY
COMMUNITY

## 2024-10-11 NOTE — PROGRESS NOTES
Patient ID: [id number]     Chief Complaint: Follow-up      HPI:      Disclaimer:  This note is prepared using voice recognition software and as such is likely to have errors despite attempts at proofreading. Please contact me for questions.     Rosetta Loving is a 68 y.o. female here today for the following      Acute Issues-  Patient was recently discharged from [facility] hospital.  She comes along with her daughter.  As of today patient is PHQ-9 score today is 13.  She is currently on Lexapro 5 mg but would like to go up on it.  She is also on clonidine 0.5 p.r.n. b.i.d. for anxiety.  She recently lost her  and is in grief    Requesting for medications for constipation.     Hyperlipidemia   Requesting for cholesterol medication refills      Chronic Issues-    -------------------------------------    Diabetes mellitus, type 2    Hypertension        Past Surgical History:   Procedure Laterality Date    APPENDECTOMY      Arm repair Left 10/09/2023    CHOLECYSTECTOMY  1994    Yes    COLONOSCOPY  1954    Dr. Elie Pickett    GALLBLADDER SURGERY      HIP SURGERY      HYSTERECTOMY      KNEE SURGERY      TONSILLECTOMY         Review of patient's allergies indicates:  No Known Allergies    Current Outpatient Medications   Medication Instructions    ALPRAZolam (XANAX) 0.25 mg, Oral, Daily PRN    busPIRone (BUSPAR) 15 mg, Oral, 3 times daily    clonazePAM (KLONOPIN) 0.5 mg, 2 times daily    cyanocobalamin (VITAMIN B-12) 100 mcg, Daily    diclofenac (VOLTAREN) 75 mg, Daily    diclofenac sodium (SOLARAZE) 3 % gel 1 application , 2 times daily    empagliflozin-linagliptin (GLYXAMBI) 25-5 mg Tab 1 tablet, Oral, Daily    EScitalopram oxalate (LEXAPRO) 5 mg    lisinopriL-hydrochlorothiazide (PRINZIDE,ZESTORETIC) 20-12.5 mg per tablet 1 tablet, Oral, Daily    MELATONIN ORAL Take by mouth.    metFORMIN (GLUCOPHAGE-XR) 500 mg, Oral, 2 times daily    multivit-min/FA/lycopen/lutein (SENTRY SENIOR  ORAL) Take by mouth.    ondansetron (ZOFRAN-ODT) 8 mg, Once    pantoprazole (PROTONIX) 40 mg, Oral, Daily    pravastatin (PRAVACHOL) 40 mg, Oral, Daily    traZODone (DESYREL) 50 mg, Oral, Nightly       Social History     Socioeconomic History    Marital status:    Tobacco Use    Smoking status: Never     Passive exposure: Never    Smokeless tobacco: Never    Tobacco comments:     Never   Substance and Sexual Activity    Alcohol use: Yes     Alcohol/week: 1.0 standard drink of alcohol     Types: 1 Glasses of wine per week     Comment: Social    Drug use: Never    Sexual activity: Not Currently     Partners: Male     Birth control/protection: Abstinence     Comment: Abstinence     Social Drivers of Health     Financial Resource Strain: High Risk (2/17/2024)    Overall Financial Resource Strain (CARDIA)     Difficulty of Paying Living Expenses: Hard   Food Insecurity: Food Insecurity Present (2/17/2024)    Hunger Vital Sign     Worried About Running Out of Food in the Last Year: Sometimes true     Ran Out of Food in the Last Year: Sometimes true   Transportation Needs: No Transportation Needs (2/17/2024)    PRAPARE - Transportation     Lack of Transportation (Medical): No     Lack of Transportation (Non-Medical): No   Physical Activity: Insufficiently Active (2/17/2024)    Exercise Vital Sign     Days of Exercise per Week: 2 days     Minutes of Exercise per Session: 30 min   Stress: Stress Concern Present (2/17/2024)    [employer] - Occupational Stress Questionnaire     Feeling of Stress : Rather much   Housing Stability: Low Risk  (2/17/2024)    Housing Stability Vital Sign     Unable to Pay for Housing in the Last Year: No     Number of Places Lived in the Last Year: 1     Unstable Housing in the Last Year: No        Family History   Problem Relation Name Age of Onset    Arthritis Mother Sara Simmons         Yes    Cancer Mother Sara Simmons         Breast    Depression Mother  "Sara Simmons         Yes    Hypertension Mother Sara Simmons         Yes    Mental illness Mother Sara Simmons         Yes    Diabetes Father ROM Simmons         Yes        Patient Care Team:  Sonya Vela MD as PCP - General (Family Medicine)     Subjective:     ROS    See HPI for details    Constitutional: Denies Change in appetite. Denies Chills. Denies Fever. Denies Night sweats.  Respiratory: Denies Cough. Denies Shortness of breath. Denies Shortness of breath with exertion. Denies Wheezing.  Cardiovascular: DeniesChest pain at rest. Denies Chest pain with exertion. Denies Irregular heartbeat. Denies Palpitations. Denies Edema.  Gastrointestinal: Denies Abdominal pain. DeniesDiarrhea. Denies Nausea. Denies Vomiting. Denies Hematemesis or Hematochezia.  Genitourinary: Denies Dysuria. Denies Urinary frequency. Denies Urinary urgency. Denies Blood in urine.  Endocrine: Denies Cold intolerance. Denies Excessive thirst. Denies Heat intolerance. Denies Weight loss. Denies Weight gain.  Musculoskeletal: Denies Painful joints. Denies Weakness.  Integumentary: Denies Rash. Denies Itching. Denies Dry skin.  Neurologic: Denies Dizziness. Denies Fainting. Denies Headache.  Psychiatric: Denies Depression. Denies Anxiety. Denies Suicidal/Homicidal ideations.    All Other ROS: Negative except as stated in HPI.  Objective:     Visit Vitals  /81   Pulse 90   Temp 98.2 °F (36.8 °C) (Oral)   Resp 18   Ht 5' 4" (1.626 m)   Wt 88 kg (194 lb)   SpO2 96%   BMI 33.30 kg/m²       Physical Exam    General: Alert and oriented, obese,  No acute distress.  Respiratory: Clear to auscultation bilaterally; No wheezes, rales or rhonchi,Non-labored respirations, Symmetrical chest wall expansion.  Cardiovascular: Regular rate and rhythm, S1/S2 normal, No murmurs, rubs or gallops.  Gastrointestinal: Soft, Non-tender, Non-distended, Normal bowel sounds, No palpable organomegaly.  Musculoskeletal: Normal range of motion.  Extremities: " No clubbing, cyanosis or edema  Neurologic: No focal deficits  Psychiatric: Normal interaction, Coherent speech, Euthymic mood, Appropriate affect   Assessment:       ICD-10-CM ICD-9-CM   1. Grief  F43.21 309.0   2. Moderate episode of recurrent major depressive disorder  F33.1 296.32   3. SWATHI (generalized anxiety disorder)  F41.1 300.02   4. Drug-induced constipation  K59.03 564.09     E980.5   5. Type 2 diabetes mellitus with hyperglycemia, without long-term current use of insulin  E11.65 250.00     790.29   6. Mixed hyperlipidemia  E78.2 272.2   7. Screening for thyroid disorder  Z13.29 V77.0   8. Vitamin D deficiency  E55.9 268.9   9. Class 1 obesity due to excess calories without serious comorbidity with body mass index (BMI) of 33.0 to 33.9 in adult  E66.811 278.00    E66.09 V85.33    Z68.33         Plan:     1. Grief  2. Moderate episode of recurrent major depressive disorder  3. SWATHI (generalized anxiety disorder)  -     Ambulatory referral/consult to Behavioral Health; Future; Expected date: 10/18/2024  -     EScitalopram oxalate (LEXAPRO) 10 MG tablet; Take 1 tablet (10 mg total) by mouth once daily.  Dispense: 90 tablet; Refill: 0  Increase Lexapro as prescribed   ER precautions provided   Can continue p.r.n. clonidine    4. Drug-induced constipation  Other orders  -     polyethylene glycol (GLYCOLAX) 17 gram PwPk; Take 17 g by mouth once daily.  Dispense: 30 packet; Refill: 0  Increase water intake  Increase physical active  5. Type 2 diabetes mellitus with hyperglycemia, without long-term current use of insulin  Continue Glyxambi as prescribed   Continue 35 minutes of brisk walking and diabetic diet  -     CBC Auto Differential; Future; Expected date: 10/11/2024  -     Comprehensive Metabolic Panel; Future; Expected date: 10/11/2024  -     Hemoglobin A1C; Future; Expected date: 10/11/2024  -     Urinalysis; Future; Expected date: 10/11/2024  -     Microalbumin/Creatinine Ratio, Urine; Future; Expected  date: 10/11/2024  -     Vitamin B12; Future; Expected date: 10/11/2024  -     Folate; Future; Expected date: 10/11/2024  -     empagliflozin-linagliptin (GLYXAMBI) 25-5 mg Tab; Take 1 tablet by mouth Daily.  Dispense: 90 tablet; Refill: 0    6. Mixed hyperlipidemia  -     Lipid Panel; Future; Expected date: 10/11/2024    7. Screening for thyroid disorder  -     TSH; Future; Expected date: 10/11/2024    8. Vitamin D deficiency  -     Vitamin D; Future; Expected date: 10/11/2024    9. Class 1 obesity due to excess calories without serious comorbidity with body mass index (BMI) of 33.0 to 33.9 in adult  Body mass index is 33.3 kg/m².  Goal BMI <30.  Exercise 5 times a week for 30 minutes per day.  Avoid soda, simple sugars, excessive rice, potatoes or bread. Limit fast foods and fried foods.  Choose complex carbs in moderation (example: green vegetables, beans, oatmeal). Eat plenty of fresh fruits and vegetables with lean meats daily.  Do not skip meals. Eat a balanced portion size.  Avoid fad diets. Consider permanent healthy life style changes.              Medication List with Changes/Refills   Current Medications    ALPRAZOLAM (XANAX) 0.25 MG TABLET    Take 1 tablet (0.25 mg total) by mouth daily as needed for Anxiety (Anxiety).       Start Date: 9/18/2024 End Date: 10/18/2024    BUSPIRONE (BUSPAR) 15 MG TABLET    Take 1 tablet (15 mg total) by mouth 3 (three) times daily.       Start Date: 8/6/2024  End Date: 8/6/2025    CLONAZEPAM (KLONOPIN) 0.5 MG TABLET    Take 0.5 mg by mouth 2 (two) times daily.       Start Date: 10/7/2024 End Date: --    CYANOCOBALAMIN (VITAMIN B-12) 100 MCG TABLET    Take 100 mcg by mouth once daily.       Start Date: --        End Date: --    DICLOFENAC (VOLTAREN) 75 MG EC TABLET    Take 75 mg by mouth once daily.       Start Date: --        End Date: --    DICLOFENAC SODIUM (SOLARAZE) 3 % GEL    Apply 1 application  topically 2 (two) times daily.       Start Date: 7/17/2023 End Date: --     EMPAGLIFLOZIN-LINAGLIPTIN (GLYXAMBI) 25-5 MG TAB    Take 1 tablet by mouth Daily.       Start Date: 7/3/2024  End Date: --    ESCITALOPRAM OXALATE (LEXAPRO) 5 MG TAB    Take 5 mg by mouth.       Start Date: 10/5/2024 End Date: --    LISINOPRIL-HYDROCHLOROTHIAZIDE (PRINZIDE,ZESTORETIC) 20-12.5 MG PER TABLET    Take 1 tablet by mouth once daily.       Start Date: 9/18/2024 End Date: 12/17/2024    MELATONIN ORAL    Take by mouth.       Start Date: --        End Date: --    METFORMIN (GLUCOPHAGE-XR) 500 MG ER 24HR TABLET    Take 1 tablet (500 mg total) by mouth 2 (two) times daily.       Start Date: 5/28/2024 End Date: 9/18/2024    MULTIVIT-MIN/FA/LYCOPEN/LUTEIN (SENTRY SENIOR ORAL)    Take by mouth.       Start Date: --        End Date: --    ONDANSETRON (ZOFRAN-ODT) 8 MG TBDL    Take 8 mg by mouth once.       Start Date: --        End Date: --    PANTOPRAZOLE (PROTONIX) 40 MG TABLET    Take 1 tablet (40 mg total) by mouth once daily.       Start Date: 7/3/2024  End Date: 10/1/2024    PRAVASTATIN (PRAVACHOL) 40 MG TABLET    Take 1 tablet (40 mg total) by mouth once daily.       Start Date: 9/18/2024 End Date: 9/18/2025    TRAZODONE (DESYREL) 50 MG TABLET    Take 1 tablet (50 mg total) by mouth every evening.       Start Date: 8/5/2024  End Date: 8/5/2025          Follow up in about 4 weeks (around 11/8/2024) for Medicare Wellness.   In addition to their scheduled follow up, the patient has also been instructed to follow up on as needed basis.     Future Appointments   Date Time Provider Department Center   10/30/2024 10:45 AM Sonya Vela MD [facility]

## 2024-10-31 ENCOUNTER — LAB VISIT (OUTPATIENT)
Dept: LAB | Facility: HOSPITAL | Age: 69
End: 2024-10-31
Attending: STUDENT IN AN ORGANIZED HEALTH CARE EDUCATION/TRAINING PROGRAM
Payer: MEDICARE

## 2024-10-31 DIAGNOSIS — E55.9 VITAMIN D DEFICIENCY: ICD-10-CM

## 2024-10-31 DIAGNOSIS — Z13.29 SCREENING FOR THYROID DISORDER: ICD-10-CM

## 2024-10-31 DIAGNOSIS — E78.2 MIXED HYPERLIPIDEMIA: ICD-10-CM

## 2024-10-31 DIAGNOSIS — E11.65 TYPE 2 DIABETES MELLITUS WITH HYPERGLYCEMIA, WITHOUT LONG-TERM CURRENT USE OF INSULIN: ICD-10-CM

## 2024-10-31 LAB
25(OH)D3+25(OH)D2 SERPL-MCNC: 67 NG/ML (ref 30–80)
ALBUMIN SERPL-MCNC: 3.9 G/DL (ref 3.4–4.8)
ALBUMIN/GLOB SERPL: 1.3 RATIO (ref 1.1–2)
ALP SERPL-CCNC: 95 UNIT/L (ref 40–150)
ALT SERPL-CCNC: 13 UNIT/L (ref 0–55)
ANION GAP SERPL CALC-SCNC: 14 MEQ/L
AST SERPL-CCNC: 15 UNIT/L (ref 5–34)
BASOPHILS # BLD AUTO: 0.06 X10(3)/MCL
BASOPHILS NFR BLD AUTO: 0.8 %
BILIRUB SERPL-MCNC: 0.8 MG/DL
BUN SERPL-MCNC: 35.8 MG/DL (ref 9.8–20.1)
CALCIUM SERPL-MCNC: 10 MG/DL (ref 8.4–10.2)
CHLORIDE SERPL-SCNC: 99 MMOL/L (ref 98–107)
CHOLEST SERPL-MCNC: 227 MG/DL
CHOLEST/HDLC SERPL: 5 {RATIO} (ref 0–5)
CO2 SERPL-SCNC: 27 MMOL/L (ref 23–31)
CREAT SERPL-MCNC: 1.41 MG/DL (ref 0.55–1.02)
CREAT UR-MCNC: 564.6 MG/DL (ref 45–106)
CREAT/UREA NIT SERPL: 25
EOSINOPHIL # BLD AUTO: 0.07 X10(3)/MCL (ref 0–0.9)
EOSINOPHIL NFR BLD AUTO: 1 %
ERYTHROCYTE [DISTWIDTH] IN BLOOD BY AUTOMATED COUNT: 13.7 % (ref 11.5–17)
EST. AVERAGE GLUCOSE BLD GHB EST-MCNC: 119.8 MG/DL
FOLATE SERPL-MCNC: 16.1 NG/ML (ref 7–31.4)
GFR SERPLBLD CREATININE-BSD FMLA CKD-EPI: 40 ML/MIN/1.73/M2
GLOBULIN SER-MCNC: 3 GM/DL (ref 2.4–3.5)
GLUCOSE SERPL-MCNC: 147 MG/DL (ref 82–115)
HBA1C MFR BLD: 5.8 %
HCT VFR BLD AUTO: 43.5 % (ref 37–47)
HDLC SERPL-MCNC: 47 MG/DL (ref 35–60)
HGB BLD-MCNC: 14.3 G/DL (ref 12–16)
IMM GRANULOCYTES # BLD AUTO: 0.03 X10(3)/MCL (ref 0–0.04)
IMM GRANULOCYTES NFR BLD AUTO: 0.4 %
LDLC SERPL CALC-MCNC: 158 MG/DL (ref 50–140)
LYMPHOCYTES # BLD AUTO: 2.1 X10(3)/MCL (ref 0.6–4.6)
LYMPHOCYTES NFR BLD AUTO: 29 %
MCH RBC QN AUTO: 30.3 PG (ref 27–31)
MCHC RBC AUTO-ENTMCNC: 32.9 G/DL (ref 33–36)
MCV RBC AUTO: 92.2 FL (ref 80–94)
MICROALBUMIN UR-MCNC: 116.2 UG/ML
MICROALBUMIN/CREAT RATIO PNL UR: 20.6 MG/GM CR (ref 0–30)
MONOCYTES # BLD AUTO: 0.62 X10(3)/MCL (ref 0.1–1.3)
MONOCYTES NFR BLD AUTO: 8.6 %
NEUTROPHILS # BLD AUTO: 4.35 X10(3)/MCL (ref 2.1–9.2)
NEUTROPHILS NFR BLD AUTO: 60.2 %
NRBC BLD AUTO-RTO: 0 %
PLATELET # BLD AUTO: 311 X10(3)/MCL (ref 130–400)
PMV BLD AUTO: 10.2 FL (ref 7.4–10.4)
POTASSIUM SERPL-SCNC: 4.2 MMOL/L (ref 3.5–5.1)
PROT SERPL-MCNC: 6.9 GM/DL (ref 5.8–7.6)
RBC # BLD AUTO: 4.72 X10(6)/MCL (ref 4.2–5.4)
SODIUM SERPL-SCNC: 140 MMOL/L (ref 136–145)
TRIGL SERPL-MCNC: 112 MG/DL (ref 37–140)
TSH SERPL-ACNC: 2.1 UIU/ML (ref 0.35–4.94)
VIT B12 SERPL-MCNC: >2000 PG/ML (ref 213–816)
VLDLC SERPL CALC-MCNC: 22 MG/DL
WBC # BLD AUTO: 7.23 X10(3)/MCL (ref 4.5–11.5)

## 2024-10-31 PROCEDURE — 80061 LIPID PANEL: CPT

## 2024-10-31 PROCEDURE — 82306 VITAMIN D 25 HYDROXY: CPT

## 2024-10-31 PROCEDURE — 82607 VITAMIN B-12: CPT

## 2024-10-31 PROCEDURE — 85025 COMPLETE CBC W/AUTO DIFF WBC: CPT

## 2024-10-31 PROCEDURE — 82570 ASSAY OF URINE CREATININE: CPT

## 2024-10-31 PROCEDURE — 36415 COLL VENOUS BLD VENIPUNCTURE: CPT

## 2024-10-31 PROCEDURE — 82746 ASSAY OF FOLIC ACID SERUM: CPT

## 2024-10-31 PROCEDURE — 80053 COMPREHEN METABOLIC PANEL: CPT

## 2024-10-31 PROCEDURE — 84443 ASSAY THYROID STIM HORMONE: CPT

## 2024-10-31 PROCEDURE — 83036 HEMOGLOBIN GLYCOSYLATED A1C: CPT

## 2024-11-01 ENCOUNTER — TELEPHONE (OUTPATIENT)
Dept: FAMILY MEDICINE | Facility: CLINIC | Age: 69
End: 2024-11-01
Payer: MEDICARE

## 2024-11-01 NOTE — TELEPHONE ENCOUNTER
----- Message from Anabel sent at 10/31/2024  1:08 PM CDT -----  Who Called: Rosetta Lvoing    Caller is requesting assistance/information from provider's office.    Symptoms (please be specific):    How long has patient had these symptoms:    List of preferred pharmacies on file (remove unneeded): [unfilled]  If different, enter pharmacy into here including location and phone number:       Preferred Method of Contact: Phone Call  Patient's Preferred Phone Number on File: [phone]   Best Call Back Number, if different:  Additional Information:  pt would like to speak with nurse about appt, pt stated that she can't drive (death of  ) for her wellness appt on 11/6/24

## 2024-11-04 ENCOUNTER — TELEPHONE (OUTPATIENT)
Dept: FAMILY MEDICINE | Facility: CLINIC | Age: 69
End: 2024-11-04
Payer: MEDICARE

## 2024-11-04 NOTE — TELEPHONE ENCOUNTER
----- Message from ForMune sent at 11/4/2024  9:19 AM CST -----  Who Called: Rosetta Loving    Caller is requesting assistance/information from provider's office.    Symptoms (please be specific): n/a   How long has patient had these symptoms:  n/a  List of preferred pharmacies on file (remove unneeded): [unfilled]  If different, enter pharmacy into here including location and phone number: n/a      Preferred Method of Contact: Phone Call  Patient's Preferred Phone Number on File: [phone]   Best Call Back Number, if different:  Additional Information: medical advice, pt called to discuss changing appt 11/6/24 to a tele-med visit instead due to not able to drive since spouse has passed away, please advise, thanks

## 2024-11-06 ENCOUNTER — OFFICE VISIT (OUTPATIENT)
Dept: FAMILY MEDICINE | Facility: CLINIC | Age: 69
End: 2024-11-06
Payer: MEDICARE

## 2024-11-06 VITALS
HEIGHT: 64 IN | SYSTOLIC BLOOD PRESSURE: 122 MMHG | WEIGHT: 182 LBS | DIASTOLIC BLOOD PRESSURE: 82 MMHG | BODY MASS INDEX: 31.07 KG/M2

## 2024-11-06 DIAGNOSIS — Z00.00 WELLNESS EXAMINATION: ICD-10-CM

## 2024-11-06 DIAGNOSIS — I10 PRIMARY HYPERTENSION: ICD-10-CM

## 2024-11-06 DIAGNOSIS — N18.32 STAGE 3B CHRONIC KIDNEY DISEASE: ICD-10-CM

## 2024-11-06 DIAGNOSIS — E11.65 TYPE 2 DIABETES MELLITUS WITH HYPERGLYCEMIA, WITHOUT LONG-TERM CURRENT USE OF INSULIN: ICD-10-CM

## 2024-11-06 DIAGNOSIS — Z13.31 POSITIVE DEPRESSION SCREENING: ICD-10-CM

## 2024-11-06 DIAGNOSIS — E66.09 CLASS 1 OBESITY DUE TO EXCESS CALORIES WITH BODY MASS INDEX (BMI) OF 31.0 TO 31.9 IN ADULT, UNSPECIFIED WHETHER SERIOUS COMORBIDITY PRESENT: ICD-10-CM

## 2024-11-06 DIAGNOSIS — K21.9 GASTROESOPHAGEAL REFLUX DISEASE WITHOUT ESOPHAGITIS: ICD-10-CM

## 2024-11-06 DIAGNOSIS — N17.9 AKI (ACUTE KIDNEY INJURY): ICD-10-CM

## 2024-11-06 DIAGNOSIS — E66.811 CLASS 1 OBESITY DUE TO EXCESS CALORIES WITH BODY MASS INDEX (BMI) OF 31.0 TO 31.9 IN ADULT, UNSPECIFIED WHETHER SERIOUS COMORBIDITY PRESENT: ICD-10-CM

## 2024-11-06 DIAGNOSIS — E78.2 MIXED HYPERLIPIDEMIA: ICD-10-CM

## 2024-11-06 PROBLEM — E11.9 DIABETES MELLITUS: Status: ACTIVE | Noted: 2024-11-06

## 2024-11-06 PROBLEM — E66.1 CLASS 1 DRUG-INDUCED OBESITY WITHOUT SERIOUS COMORBIDITY WITH BODY MASS INDEX (BMI) OF 31.0 TO 31.9 IN ADULT: Status: ACTIVE | Noted: 2023-08-29

## 2024-11-06 RX ORDER — DAPAGLIFLOZIN 5 MG/1
5 TABLET, FILM COATED ORAL DAILY
Qty: 90 TABLET | Refills: 3 | Status: SHIPPED | OUTPATIENT
Start: 2024-11-06 | End: 2024-11-07 | Stop reason: SDUPTHER

## 2024-11-06 RX ORDER — PRAVASTATIN SODIUM 40 MG/1
40 TABLET ORAL DAILY
Qty: 90 TABLET | Refills: 3 | Status: SHIPPED | OUTPATIENT
Start: 2024-11-06 | End: 2025-11-06

## 2024-11-06 RX ORDER — SEMAGLUTIDE 0.68 MG/ML
0.25 INJECTION, SOLUTION SUBCUTANEOUS
Qty: 1.5 ML | Refills: 0 | Status: SHIPPED | OUTPATIENT
Start: 2024-11-06 | End: 2024-12-06

## 2024-11-06 RX ORDER — DULOXETIN HYDROCHLORIDE 20 MG/1
20 CAPSULE, DELAYED RELEASE ORAL NIGHTLY
COMMUNITY
Start: 2024-10-31

## 2024-11-06 NOTE — PROGRESS NOTES
Patient ID: [id number]     Chief Complaint: Annual Exam        HPI:      Disclaimer:  This note is prepared using voice recognition software and as such is likely to have errors despite attempts at proofreading. Please contact me for questions.     Rosetta oLving is a 68 y.o. female here today for a Medicare Wellness.     A separate E/M code has been provided to evaluate additional complaints that the patient would like addressed during the dedicated Medicare Wellness Exam.    The patient has following issues to discuss    Acute Issues-  overall doing good.  Denies of any new concerns.  Discuss the following issues    Hypertension   At goal    Depression   PHQ-9 score today is 4   Okay currently on Lexapro and is weaning herself   Is on duloxetine   Has been also taking Klonopin p.r.n. for panic attacks.    Seeing Dr. Brandon Fleming     Diabetes mellitus   Hemoglobin A1c   Date Value Ref Range Status   10/31/2024 5.8 <=7.0 % Final   06/28/2024 6.3 <=7.0 % Final   03/19/2024 5.8 <=7.0 % Final    On  Metformin is due for an eye exam      Obese  Body mass index is 31.24 kg/m².    HLD-   Currently well-controlled on Pravastatin    Acid reflux-   Well-controlled on pantoprazole    Chronic Issues-  -------------------------------------    Diabetes mellitus, type 2    Hypertension          Past Surgical History:   Procedure Laterality Date    APPENDECTOMY      Arm repair Left 10/09/2023    CHOLECYSTECTOMY  1994    Yes    COLONOSCOPY  1955    Dr. Elie Pickett    GALLBLADDER SURGERY      HIP SURGERY      HYSTERECTOMY      KNEE SURGERY      TONSILLECTOMY         Review of patient's allergies indicates:  No Known Allergies    Current Outpatient Medications   Medication Instructions    busPIRone (BUSPAR) 15 mg, Oral, 3 times daily    clonazePAM (KLONOPIN) 0.5 mg, 2 times daily    cyanocobalamin (VITAMIN B-12) 100 mcg, Daily    diclofenac (VOLTAREN) 75 mg, Daily    diclofenac sodium (SOLARAZE) 3 % gel 1  application , 2 times daily    DULoxetine (CYMBALTA) 20 mg, Nightly    empagliflozin-linagliptin (GLYXAMBI) 25-5 mg Tab 1 tablet, Oral, Daily    EScitalopram oxalate (LEXAPRO) 10 mg, Oral, Daily    lisinopriL-hydrochlorothiazide (PRINZIDE,ZESTORETIC) 20-12.5 mg per tablet 1 tablet, Oral, Daily    MELATONIN ORAL Take by mouth.    metFORMIN (GLUCOPHAGE-XR) 500 mg, Oral, 2 times daily    multivit-min/FA/lycopen/lutein (SENTRY SENIOR ORAL) Take by mouth.    ondansetron (ZOFRAN-ODT) 8 mg, Once    pantoprazole (PROTONIX) 40 mg, Oral, Daily    polyethylene glycol (GLYCOLAX) 17 g, Oral, Daily    pravastatin (PRAVACHOL) 40 mg, Oral, Daily       Social History     Socioeconomic History    Marital status:    Tobacco Use    Smoking status: Never     Passive exposure: Never    Smokeless tobacco: Never    Tobacco comments:     Never   Substance and Sexual Activity    Alcohol use: Yes     Alcohol/week: 1.0 standard drink of alcohol     Types: 1 Glasses of wine per week     Comment: Social    Drug use: Never    Sexual activity: Not Currently     Partners: Male     Birth control/protection: Abstinence     Comment: Abstinence     Social Drivers of Health     Financial Resource Strain: High Risk (2/17/2024)    Overall Financial Resource Strain (CARDIA)     Difficulty of Paying Living Expenses: Hard   Food Insecurity: Food Insecurity Present (2/17/2024)    Hunger Vital Sign     Worried About Running Out of Food in the Last Year: Sometimes true     Ran Out of Food in the Last Year: Sometimes true   Transportation Needs: No Transportation Needs (2/17/2024)    PRAPARE - Transportation     Lack of Transportation (Medical): No     Lack of Transportation (Non-Medical): No   Physical Activity: Insufficiently Active (2/17/2024)    Exercise Vital Sign     Days of Exercise per Week: 2 days     Minutes of Exercise per Session: 30 min   Stress: Stress Concern Present (2/17/2024)    [place] citizen [place] [employer] - Occupational  "Stress Questionnaire     Feeling of Stress : Rather much   Housing Stability: Low Risk  (2/17/2024)    Housing Stability Vital Sign     Unable to Pay for Housing in the Last Year: No     Number of Places Lived in the Last Year: 1     Unstable Housing in the Last Year: No        Family History   Problem Relation Name Age of Onset    Arthritis Mother Sara Simmons         Yes    Cancer Mother Sara Simmons         Breast    Depression Mother Sara Simmons         Yes    Hypertension Mother Sara Simmons         Yes    Mental illness Mother Sara Simmons         Yes    Diabetes Father ROM Simmons         Yes        Patient Care Team:  Sonya Vela MD as PCP - General (Family Medicine)       Subjective:     ROS    See HPI for details    Constitutional: Denies Change in appetite. Denies Chills. Denies Fever. Denies Night sweats.  Respiratory: Denies Cough. Denies Shortness of breath. Denies Shortness of breath with exertion. Denies Wheezing.  Cardiovascular: DeniesChest pain at rest. Denies Chest pain with exertion. Denies Irregular heartbeat. Denies Palpitations. Denies Edema.  Gastrointestinal: Denies Abdominal pain. DeniesDiarrhea. Denies Nausea. Denies Vomiting. Denies Hematemesis or Hematochezia.  Integumentary: Denies Rash. Denies Itching. Denies Dry skin.  Neurologic: Denies Dizziness. Denies Fainting. Denies Headache.  Psychiatric: Denies Depression. Denies Anxiety. Denies Suicidal/Homicidal ideations.    All Other ROS: Negative except as stated in HPI.     Objective:     Visit Vitals  /82   Ht 5' 4" (1.626 m)   Wt 82.6 kg (182 lb)   BMI 31.24 kg/m²       Physical Exam    General: Alert and oriented,Obese,  No acute distress.  Neck/Thyroid: Supple, Non-tender  Respiratory: Clear to auscultation bilaterally  Cardiovascular: Regular rate and rhythm  Gastrointestinal: Soft, Non-tender. No palpable organomegaly.  Musculoskeletal: Normal range of motion.  Neurologic: No focal deficits  Psychiatric: Normal " interaction, Coherent speech, Euthymic mood, Appropriate affect       Assessment:       ICD-10-CM ICD-9-CM   1. Wellness examination  Z00.00 V70.0   2. Primary hypertension  I10 401.9   3. Mixed hyperlipidemia  E78.2 272.2   4. Type 2 diabetes mellitus with hyperglycemia, without long-term current use of insulin  E11.65 250.00     790.29   5. Positive depression screening  Z13.31 796.4   6. Gastroesophageal reflux disease without esophagitis  K21.9 530.81   7. Class 1 obesity due to excess calories with body mass index (BMI) of 31.0 to 31.9 in adult, unspecified whether serious comorbidity present  E66.811 278.00    E66.09 V85.31    Z68.31         Plan:     Medicare Annual Wellness and Personalized Prevention Plan:     A comprehensive HEALTH RISK ASSESSMENT was completed today. Results are summarized below:    The following EMOTIONAL/SOCIAL CONCERNS were identified on today's screening for Social Isolation, Depression and Anxiety:  *Patient reports her health has LIMITED her SOCIAL INTERACTIONS. (During the past four weeks, has your physical and/or emotional health limited your social activities with family, friends, neighbors, or groups?: (!) Yes)  *Patient reports frequently feeling STRESSED or ANXIOUS. (Do you feel stressed (tense, restless, nervous, or anxious, or unable to sleep at night because your mind is troubled all the time)?: (!) Very much)  --A General Anxiety Disorder-7 Questionnaire was administered with the folllowing results: (SWATHI-7 Score: 21  Interpretation: Severe Anxiety)  *Patient reports symptoms of MODERATELY SEVERE DEPRESSION.  Patient Health Questionnaire-9 Score: 16     There are NO COGNITIVE FUNCTION CONCERNS identified on today's screening.  The following FUNCTIONAL AND/OR SAFETY CONCERNS were identified on today's screening for Physical Symptoms, Nutritional, Home Safety/Living Situation, Fall Risk, Activities of Daily Living, Independent Activities of Daily Living, Physical  Activity,Timed Up and Go test and Whisper test::  *Patient reports PHYSICAL ACTIVITY LIMITED BY PAIN/FATIGUE. (In the past four weeks have you your activities been limited by pain, tiredness or fatigue?: (!) Yes)  *Patient reports recently FEELING DIZZY, has a FEAR OF FALLING or HAS FALLEN. (In the past four weeks have you felt dizzy when standing up, were afraid of falling or fallen?: (!) Yes)     The patient reports NO OPIOID PRESCRIPTIONS. This was confirmed through medication reconciliation and the [facility] website.    The patient is NOT A TOBACCO USER.  The patient reports NO SIGNIFICANT ALCOHOL USE.     All Questions regarding food, transportation or housing were not answered today.    The patient was asked and declined the use of a free .    Alcohol/Tobacco Use - Stressed importance of smoking cessation and limiting alcohol intake.  Denies of smoking and alcohol intake  CVD Risk Factors - Reviewed as above @  Obesity/Physical Activity - Body mass index is 31.24 kg/m².. Encouraged daily 30 minute physical activity x 5 days per week.    Advance Care Planning  Advance Care Planning   I attest to discussing Advance Care Planning with patient and/or family member.  Education was provided including the importance of the Health Care Power of , Advance Directives, and/or [place] documentation.  The patient expressed understanding to the importance of this information and discussion.  Goals of Care: The patient expressed that what is most important right now is to focus on:   Length of ACP conversation in minutes: 6 min       Advance Care Planning     Date: 11/06/2024    [facility]  I engaged the patient in a voluntary conversation about advance care planning and we specifically addressed what the goals of care would be moving forward, in light of the patient's change in clinical status, specifically when in hospital.  We did specifically address the patient's likely prognosis, which is good .  We explored the  patient's values and preferences for future care.  The patient endorses that what is most important right now is to focus on comfort and QOL     Accordingly, we have decided that the best plan to meet the patient's goals includes continuing with treatment    A total of 6 min was spent on advance care planning, goals of care discussion, emotional support, formulating and communicating prognosis and exploring burden/benefit of various approaches of treatment. This discussion occurred on a fully voluntary basis with the verbal consent of the patient and/or family.      Full Code    Kari Simmons (Daughter)  [phone] (Mobile)       Health Maintenance         Date Due Completion Date    Hepatitis C Screening Never done ---    RSV Vaccine (Age 60+ and Pregnant patients) (1 - Risk 60-74 years 1-dose series) Never done ---    Eye Exam 08/29/2024 8/29/2023    TETANUS VACCINE 10/11/2025 (Originally 11/27/1973) ---    Shingles Vaccine (1 of 2) 10/11/2025 (Originally 11/27/2004) ---    COVID-19 Vaccine (8 - 2024-25 season) 10/11/2025 (Originally 9/1/2024) 2/16/2023    Mammogram 02/07/2025 2/7/2024    Hemoglobin A1c 04/30/2025 10/31/2024    Foot Exam 09/18/2025 9/18/2024 (Done)    Override on 9/18/2024: Done    Low Dose Statin 10/11/2025 10/11/2024    Diabetes Urine Screening 10/31/2025 10/31/2024    Lipid Panel 10/31/2025 10/31/2024    Colorectal Cancer Screening 04/29/2026 4/29/2021    DEXA Scan 02/09/2032 2/9/2022            Vaccinations -   Immunization History   Administered Date(s) Administered    COVID-19, MRNA, LN-S, PF (MODERNA FULL 0.5 ML DOSE) 03/16/2021, 04/13/2021, 08/31/2021, 08/31/2021    COVID-19, MRNA, LN-S, PF (Pfizer) (Purple Cap) 03/16/2021, 04/13/2021    COVID-19, mRNA, LNP-S, bivalent booster, PF (Moderna Omicron)12 + YEARS 02/16/2023    Influenza 10/04/2010    Influenza (FLUAD) - Quadrivalent - Adjuvanted - PF *Preferred* (65+) 10/10/2023    Influenza - Quadrivalent - High Dose - PF (65 years and  older) 12/21/2021, 11/16/2022    Influenza - Quadrivalent - PF *Preferred* (6 months and older) 10/06/2011, 01/20/2021    Influenza - Trivalent - Fluad - Adjuvanted - PF (65 years and older 09/23/2024    Influenza - Trivalent - Fluarix, Flulaval, Fluzone, Afluria - PF 10/06/2011, 01/20/2021    Pneumococcal Conjugate - 20 Valent 09/18/2024    Pneumococcal Polysaccharide - 23 Valent 12/02/2020, 08/29/2023        1. Wellness examination  AAA Screening -  (65-75)   Lung Cancer-(50-80) Smoker/ Ex smoker- Never smoked  Colon Cancer Screening(45-75) -  colonoscopy performed on April 21 in 2021.  Repeat colonoscopy in 5 Years  Cervical Cancer Screening (21-65)-Pap smear NA  Breast Cancer Screening (40-74)- mamogram  needs to be retrieved  Osteoporosis Screening(>65) - Last DEXA - normal on February 20, 2022. Repeat in 2025  Eye Exam - Last Eye Exam   Dental Exam - Recommend biannually    Diet discussed (healthy food choices, reduce portions and overall calorie intake)  Exercise 30-45 minutes 5x per week  Avoid excessive alcohol and tobacco if taking  Immunizations dicussed  Preventative exams discussed.    2. Primary hypertension   Keep goal blood pressure less than 130/80    Continue dash diet  -     CBC Auto Differential; Future; Expected date: 11/06/2025  -     Comprehensive Metabolic Panel; Future; Expected date: 11/06/2025    3. Mixed hyperlipidemia   Continue pravastatin and start 35 minutes of brisk walking with Mediterranean diet  -     pravastatin (PRAVACHOL) 40 MG tablet; Take 1 tablet (40 mg total) by mouth once daily.  Dispense: 90 tablet; Refill: 3  -     Lipid Panel; Future; Expected date: 11/06/2025    4. Type 2 diabetes mellitus with hyperglycemia, without long-term current use of insulin   Patient's GFR is less than 45   Recommend to discontinue metformin  Start  Ozempic.      Start Farxiga after kidney injury is resolved  -recommend to continue Rx as prescribed along with 35 minutes of brisk walking and  diabetic diet  - Denies of any personal/family history of pancreatic tumors/medullary carcinoma of thyroid/MEN tumors, or any unexplained flushing  -Diet. exercise, and 10% weight loss encouraged.   -Rx trial of GLP1 -inhibitors given with close monitoring to help with insulin resistance/ obesity.  - Will titrate Rx as needed/tolerated until max dose achieved.  -Notify M.D. or ER if symptoms persist or worsen, adverse Rx side effects, temp greater than 100.4, or any acute illness.  -Discussed in lengths about GLP 1 inhibitors could be teratogenic if pregnant.  Patient voiced understanding    -     TSH; Future; Expected date: 11/06/2025  -     Hemoglobin A1C; Future; Expected date: 11/06/2025  -     Urinalysis; Future; Expected date: 11/06/2025  -     Microalbumin/Creatinine Ratio, Urine; Future; Expected date: 11/06/2025  -     Uric Acid; Future; Expected date: 11/06/2025  -     Vitamin B12; Future; Expected date: 11/06/2025  -     Folate; Future; Expected date: 11/06/2025    5. Positive depression screening  Comments:  I have reviewed the positive depression score which warrants active treatment with psychotherapy and/or medications.   Continue medications per Psychiatry    6. Gastroesophageal reflux disease without esophagitis  PPI as prescribed  Avoid high greasy, minty, chocolate , spicy, acidic, fried foods and alcohol.  Reduce caffeine intake, avoid soda.Recommend to take last meal no later than 6 PM  Avoid tight clothing, chew food thoroughly.    7. Class 1 obesity due to excess calories with body mass index (BMI) of 31.0 to 31.9 in adult, unspecified whether serious comorbidity present  Body mass index is 31.24 kg/m².  Goal BMI <30.  Exercise 5 times a week for 30 minutes per day.  Avoid soda, simple sugars, excessive rice, potatoes or bread. Limit fast foods and fried foods.  Choose complex carbs in moderation (example: green vegetables, beans, oatmeal). Eat plenty of fresh fruits and vegetables with lean  meats daily.  Do not skip meals. Eat a balanced portion size.  Avoid fad diets. Consider permanent healthy life style changes.     8. RIAN (acute kidney injury)  -     Basic Metabolic Panel; Future; Expected date: 11/06/2024   Hold metformin, lisinopril, Cymbalta as of now   Increase hydration  Repeat BMP in 3 days   If BMP comes back to normal then resume lisinopril  and Cymbalta    9. Stage 3b chronic kidney disease   Likely secondary to longstanding diabetes and hypertension   Keep goal blood pressure less than 130/80, goal A1c less than 7   Avoid nephrotoxic medications   Nephrology  referral sent  -     Ambulatory referral/consult to Nephrology; Future; Expected date: 11/13/202  -     Vitamin D; Future; Expected date: 11/06/2025              Medication List with Changes/Refills   Current Medications    BUSPIRONE (BUSPAR) 15 MG TABLET    Take 1 tablet (15 mg total) by mouth 3 (three) times daily.       Start Date: 8/6/2024  End Date: 8/6/2025    CLONAZEPAM (KLONOPIN) 0.5 MG TABLET    Take 0.5 mg by mouth 2 (two) times daily.       Start Date: 10/7/2024 End Date: --    CYANOCOBALAMIN (VITAMIN B-12) 100 MCG TABLET    Take 100 mcg by mouth once daily.       Start Date: --        End Date: --    DICLOFENAC (VOLTAREN) 75 MG EC TABLET    Take 75 mg by mouth once daily.       Start Date: --        End Date: --    DICLOFENAC SODIUM (SOLARAZE) 3 % GEL    Apply 1 application  topically 2 (two) times daily.       Start Date: 7/17/2023 End Date: --    DULOXETINE (CYMBALTA) 20 MG CAPSULE    Take 20 mg by mouth every evening.       Start Date: 10/31/2024End Date: --    EMPAGLIFLOZIN-LINAGLIPTIN (GLYXAMBI) 25-5 MG TAB    Take 1 tablet by mouth Daily.       Start Date: 10/11/2024End Date: --    ESCITALOPRAM OXALATE (LEXAPRO) 10 MG TABLET    Take 1 tablet (10 mg total) by mouth once daily.       Start Date: 10/11/2024End Date: 1/9/2025    LISINOPRIL-HYDROCHLOROTHIAZIDE (PRINZIDE,ZESTORETIC) 20-12.5 MG PER TABLET    Take 1 tablet  by mouth once daily.       Start Date: 9/18/2024 End Date: 12/17/2024    MELATONIN ORAL    Take by mouth.       Start Date: --        End Date: --    METFORMIN (GLUCOPHAGE-XR) 500 MG ER 24HR TABLET    Take 1 tablet (500 mg total) by mouth 2 (two) times daily.       Start Date: 5/28/2024 End Date: 9/18/2024    MULTIVIT-MIN/FA/LYCOPEN/LUTEIN (SENTRY SENIOR ORAL)    Take by mouth.       Start Date: --        End Date: --    ONDANSETRON (ZOFRAN-ODT) 8 MG TBDL    Take 8 mg by mouth once.       Start Date: --        End Date: --    PANTOPRAZOLE (PROTONIX) 40 MG TABLET    Take 1 tablet (40 mg total) by mouth once daily.       Start Date: 7/3/2024  End Date: 10/1/2024    POLYETHYLENE GLYCOL (GLYCOLAX) 17 GRAM PWPK    Take 17 g by mouth once daily.       Start Date: 10/11/2024End Date: 11/10/2024    PRAVASTATIN (PRAVACHOL) 40 MG TABLET    Take 1 tablet (40 mg total) by mouth once daily.       Start Date: 9/18/2024 End Date: 9/18/2025          The patient's Health Maintenance was reviewed and the following appears to be due at this time:   Health Maintenance Due   Topic Date Due    Hepatitis C Screening  Never done    RSV Vaccine (Age 60+ and Pregnant patients) (1 - Risk 60-74 years 1-dose series) Never done    Eye Exam  08/29/2024         Follow up in about 6 weeks (around 12/18/2024) for Follow Up DM Plus CKD. 1 AW.  In addition to their scheduled follow up, the patient has also been instructed to follow up on as needed basis.     Provided patient with a 5-10 year written screening schedule and personal prevention plan. Recommendations were developed using the USPSTF age appropriate recommendations. Education, counseling, and referrals were provided as needed. After Visit Summary printed and given to patient which includes a list of additional screenings\tests needed.    No future appointments.  Answers submitted by the patient for this visit:  Review of Systems Questionnaire (Submitted on 11/6/2024)  activity change:  No  unexpected weight change: Yes  rhinorrhea: No  trouble swallowing: No  visual disturbance: No  chest tightness: No  polyuria: No  difficulty urinating: No  menstrual problem: No  joint swelling: No  arthralgias: No  confusion: No  dysphoric mood: No

## 2024-11-06 NOTE — LETTER
AUTHORIZATION FOR RELEASE OF   CONFIDENTIAL INFORMATION    Dear GERALD,    We are seeing Rosetta Loving, date of birth 1955, in the clinic at [facility]. Sonya Vela MD is the patient's PCP. Rosetta Loving has an outstanding lab/procedure at the time we reviewed her chart. In order to help keep her health information updated, she has authorized us to request the following medical record(s):        (  )  MAMMOGRAM                                      (  )  COLONOSCOPY      (  )  PAP SMEAR                                          (  )  OUTSIDE LAB RESULTS     (  )  DEXA SCAN                                          (  )  EYE EXAM            (  )  FOOT EXAM                                          (  )  ENTIRE RECORD     (  )  OUTSIDE IMMUNIZATIONS                 (  )  _______________         Please fax records to Sonya Vela MD, [phone]     If you have any questions, please contact THAD Whatley at [phone].           Patient Name: Rosetta Loving  : 1955  Patient Phone #: [phone]

## 2024-11-07 DIAGNOSIS — N18.32 STAGE 3B CHRONIC KIDNEY DISEASE: ICD-10-CM

## 2024-11-07 RX ORDER — DAPAGLIFLOZIN 5 MG/1
5 TABLET, FILM COATED ORAL DAILY
Qty: 90 TABLET | Refills: 3 | Status: SHIPPED | OUTPATIENT
Start: 2024-11-07 | End: 2025-11-07

## 2024-11-07 NOTE — TELEPHONE ENCOUNTER
----- Message from Skyler sent at 11/7/2024 10:45 AM CST -----  .Type:  Needs Medical Advice    Who Called: Rosetta   Symptoms (please be specific):    How long has patient had these symptoms:    Pharmacy name and phone #:    Would the patient rather a call back or a response via [facility]?   Best Call Back Number: [phone]  Additional Information: Patient requested a call back from the nurse re: her kidney disease she told me she spoke with Dr. Vela yesterday. Thanks,

## 2024-11-11 ENCOUNTER — LAB VISIT (OUTPATIENT)
Dept: LAB | Facility: HOSPITAL | Age: 69
End: 2024-11-11
Attending: STUDENT IN AN ORGANIZED HEALTH CARE EDUCATION/TRAINING PROGRAM
Payer: MEDICARE

## 2024-11-11 ENCOUNTER — PATIENT MESSAGE (OUTPATIENT)
Dept: FAMILY MEDICINE | Facility: CLINIC | Age: 69
End: 2024-11-11
Payer: MEDICARE

## 2024-11-11 DIAGNOSIS — N17.9 AKI (ACUTE KIDNEY INJURY): ICD-10-CM

## 2024-11-11 LAB
ANION GAP SERPL CALC-SCNC: 7 MEQ/L
BUN SERPL-MCNC: 9.1 MG/DL (ref 9.8–20.1)
CALCIUM SERPL-MCNC: 9.4 MG/DL (ref 8.4–10.2)
CHLORIDE SERPL-SCNC: 108 MMOL/L (ref 98–107)
CO2 SERPL-SCNC: 26 MMOL/L (ref 23–31)
CREAT SERPL-MCNC: 0.78 MG/DL (ref 0.55–1.02)
CREAT/UREA NIT SERPL: 12
GFR SERPLBLD CREATININE-BSD FMLA CKD-EPI: >60 ML/MIN/1.73/M2
GLUCOSE SERPL-MCNC: 104 MG/DL (ref 82–115)
POTASSIUM SERPL-SCNC: 3.9 MMOL/L (ref 3.5–5.1)
SODIUM SERPL-SCNC: 141 MMOL/L (ref 136–145)

## 2024-11-11 PROCEDURE — 36415 COLL VENOUS BLD VENIPUNCTURE: CPT

## 2024-11-11 PROCEDURE — 80048 BASIC METABOLIC PNL TOTAL CA: CPT

## 2024-11-11 NOTE — PROGRESS NOTES
Please inform patient of lab results.    Acute kidney injury resolved   Resume medications and keep hydrated   Avoid nephrotoxic medications    Thanks,    Dr. Vela

## 2024-11-21 ENCOUNTER — TELEPHONE (OUTPATIENT)
Dept: FAMILY MEDICINE | Facility: CLINIC | Age: 69
End: 2024-11-21
Payer: MEDICARE

## 2024-11-21 NOTE — TELEPHONE ENCOUNTER
----- Message from Amelia sent at 11/21/2024 10:04 AM CST -----  Regarding: referral  .Who Called: Rosetta Loving    Caller is requesting assistance/information from provider's office.    Symptoms (please be specific):    How long has patient had these symptoms:    List of preferred pharmacies on file (remove unneeded): [unfilled]  If different, enter pharmacy into here including location and phone number:       Preferred Method of Contact: Phone Call  Patient's Preferred Phone Number on File: [phone]   Best Call Back Number, if different:  Additional Information: pt requesting a call back on the status of referral

## 2024-12-31 DIAGNOSIS — N18.32 STAGE 3B CHRONIC KIDNEY DISEASE: Primary | ICD-10-CM

## 2025-01-06 ENCOUNTER — LAB VISIT (OUTPATIENT)
Dept: LAB | Facility: HOSPITAL | Age: 70
End: 2025-01-06
Attending: INTERNAL MEDICINE
Payer: MEDICARE

## 2025-01-06 DIAGNOSIS — N18.32 STAGE 3B CHRONIC KIDNEY DISEASE: ICD-10-CM

## 2025-01-06 LAB
ALBUMIN SERPL-MCNC: 3.6 G/DL (ref 3.4–4.8)
ALBUMIN/GLOB SERPL: 1.3 RATIO (ref 1.1–2)
ALP SERPL-CCNC: 99 UNIT/L (ref 40–150)
ALT SERPL-CCNC: 8 UNIT/L (ref 0–55)
ANION GAP SERPL CALC-SCNC: 9 MEQ/L
AST SERPL-CCNC: 11 UNIT/L (ref 5–34)
BACTERIA #/AREA URNS AUTO: ABNORMAL /HPF
BASOPHILS # BLD AUTO: 0.1 X10(3)/MCL
BASOPHILS NFR BLD AUTO: 1.4 %
BILIRUB SERPL-MCNC: 0.3 MG/DL
BILIRUB UR QL STRIP.AUTO: NEGATIVE
BUN SERPL-MCNC: 29 MG/DL (ref 9.8–20.1)
CALCIUM SERPL-MCNC: 8.9 MG/DL (ref 8.4–10.2)
CHLORIDE SERPL-SCNC: 107 MMOL/L (ref 98–107)
CLARITY UR: CLEAR
CO2 SERPL-SCNC: 22 MMOL/L (ref 23–31)
COLOR UR AUTO: ABNORMAL
CREAT SERPL-MCNC: 0.87 MG/DL (ref 0.55–1.02)
CREAT UR-MCNC: 75.1 MG/DL (ref 45–106)
CREAT/UREA NIT SERPL: 33
EOSINOPHIL # BLD AUTO: 0.17 X10(3)/MCL (ref 0–0.9)
EOSINOPHIL NFR BLD AUTO: 2.3 %
ERYTHROCYTE [DISTWIDTH] IN BLOOD BY AUTOMATED COUNT: 13 % (ref 11.5–17)
GFR SERPLBLD CREATININE-BSD FMLA CKD-EPI: >60 ML/MIN/1.73/M2
GLOBULIN SER-MCNC: 2.8 GM/DL (ref 2.4–3.5)
GLUCOSE SERPL-MCNC: 128 MG/DL (ref 82–115)
GLUCOSE UR QL STRIP: NORMAL
HCT VFR BLD AUTO: 39.1 % (ref 37–47)
HGB BLD-MCNC: 13.1 G/DL (ref 12–16)
HGB UR QL STRIP: NEGATIVE
IMM GRANULOCYTES # BLD AUTO: 0.02 X10(3)/MCL (ref 0–0.04)
IMM GRANULOCYTES NFR BLD AUTO: 0.3 %
KETONES UR QL STRIP: NEGATIVE
LEUKOCYTE ESTERASE UR QL STRIP: 75
LYMPHOCYTES # BLD AUTO: 1.61 X10(3)/MCL (ref 0.6–4.6)
LYMPHOCYTES NFR BLD AUTO: 22.1 %
MCH RBC QN AUTO: 30.6 PG (ref 27–31)
MCHC RBC AUTO-ENTMCNC: 33.5 G/DL (ref 33–36)
MCV RBC AUTO: 91.4 FL (ref 80–94)
MONOCYTES # BLD AUTO: 0.59 X10(3)/MCL (ref 0.1–1.3)
MONOCYTES NFR BLD AUTO: 8.1 %
MUCOUS THREADS URNS QL MICRO: ABNORMAL /LPF
NEUTROPHILS # BLD AUTO: 4.78 X10(3)/MCL (ref 2.1–9.2)
NEUTROPHILS NFR BLD AUTO: 65.8 %
NITRITE UR QL STRIP: NEGATIVE
NRBC BLD AUTO-RTO: 0 %
PH UR STRIP: 5.5 [PH]
PHOSPHATE SERPL-MCNC: 3.6 MG/DL (ref 2.3–4.7)
PLATELET # BLD AUTO: 302 X10(3)/MCL (ref 130–400)
PMV BLD AUTO: 10 FL (ref 7.4–10.4)
POTASSIUM SERPL-SCNC: 4.6 MMOL/L (ref 3.5–5.1)
PROT SERPL-MCNC: 6.4 GM/DL (ref 5.8–7.6)
PROT UR QL STRIP: NEGATIVE
PROT UR STRIP-MCNC: 7.3 MG/DL
RBC # BLD AUTO: 4.28 X10(6)/MCL (ref 4.2–5.4)
RBC #/AREA URNS AUTO: ABNORMAL /HPF
SODIUM SERPL-SCNC: 138 MMOL/L (ref 136–145)
SP GR UR STRIP.AUTO: 1.02 (ref 1–1.03)
SQUAMOUS #/AREA URNS LPF: ABNORMAL /HPF
URINE PROTEIN/CREATININE RATIO (OLG): 0.1
UROBILINOGEN UR STRIP-ACNC: NORMAL
WBC # BLD AUTO: 7.27 X10(3)/MCL (ref 4.5–11.5)
WBC #/AREA URNS AUTO: ABNORMAL /HPF

## 2025-01-06 PROCEDURE — 36415 COLL VENOUS BLD VENIPUNCTURE: CPT

## 2025-01-06 PROCEDURE — 80053 COMPREHEN METABOLIC PANEL: CPT

## 2025-01-06 PROCEDURE — 81015 MICROSCOPIC EXAM OF URINE: CPT

## 2025-01-06 PROCEDURE — 82570 ASSAY OF URINE CREATININE: CPT

## 2025-01-06 PROCEDURE — 84100 ASSAY OF PHOSPHORUS: CPT

## 2025-01-06 PROCEDURE — 85025 COMPLETE CBC W/AUTO DIFF WBC: CPT

## 2025-01-08 ENCOUNTER — OFFICE VISIT (OUTPATIENT)
Dept: NEPHROLOGY | Facility: CLINIC | Age: 70
End: 2025-01-08
Payer: MEDICARE

## 2025-01-08 VITALS
RESPIRATION RATE: 20 BRPM | OXYGEN SATURATION: 99 % | BODY MASS INDEX: 32.33 KG/M2 | DIASTOLIC BLOOD PRESSURE: 80 MMHG | HEIGHT: 64 IN | SYSTOLIC BLOOD PRESSURE: 110 MMHG | WEIGHT: 189.38 LBS | HEART RATE: 119 BPM | TEMPERATURE: 98 F

## 2025-01-08 DIAGNOSIS — N18.2 STAGE 2 CHRONIC KIDNEY DISEASE: Primary | ICD-10-CM

## 2025-01-08 DIAGNOSIS — I10 PRIMARY HYPERTENSION: ICD-10-CM

## 2025-01-08 DIAGNOSIS — N17.9 AKI (ACUTE KIDNEY INJURY): ICD-10-CM

## 2025-01-08 DIAGNOSIS — E11.65 TYPE 2 DIABETES MELLITUS WITH HYPERGLYCEMIA, WITHOUT LONG-TERM CURRENT USE OF INSULIN: ICD-10-CM

## 2025-01-08 PROCEDURE — 3288F FALL RISK ASSESSMENT DOCD: CPT | Mod: CPTII,S$GLB,, | Performed by: NURSE PRACTITIONER

## 2025-01-08 PROCEDURE — 1101F PT FALLS ASSESS-DOCD LE1/YR: CPT | Mod: CPTII,S$GLB,, | Performed by: NURSE PRACTITIONER

## 2025-01-08 PROCEDURE — 3008F BODY MASS INDEX DOCD: CPT | Mod: CPTII,S$GLB,, | Performed by: NURSE PRACTITIONER

## 2025-01-08 PROCEDURE — 99999 PR PBB SHADOW E&M-EST. PATIENT-LVL V: CPT | Mod: PBBFAC,,, | Performed by: NURSE PRACTITIONER

## 2025-01-08 PROCEDURE — 1159F MED LIST DOCD IN RCRD: CPT | Mod: CPTII,S$GLB,, | Performed by: NURSE PRACTITIONER

## 2025-01-08 PROCEDURE — 99204 OFFICE O/P NEW MOD 45 MIN: CPT | Mod: S$GLB,,, | Performed by: NURSE PRACTITIONER

## 2025-01-08 PROCEDURE — 3066F NEPHROPATHY DOC TX: CPT | Mod: CPTII,S$GLB,, | Performed by: NURSE PRACTITIONER

## 2025-01-08 PROCEDURE — 3079F DIAST BP 80-89 MM HG: CPT | Mod: CPTII,S$GLB,, | Performed by: NURSE PRACTITIONER

## 2025-01-08 PROCEDURE — 1126F AMNT PAIN NOTED NONE PRSNT: CPT | Mod: CPTII,S$GLB,, | Performed by: NURSE PRACTITIONER

## 2025-01-08 PROCEDURE — 3074F SYST BP LT 130 MM HG: CPT | Mod: CPTII,S$GLB,, | Performed by: NURSE PRACTITIONER

## 2025-01-08 RX ORDER — BERBERINE CHLOR/SEAWEED/CHROM 500-250 MG
1 CAPSULE ORAL 2 TIMES DAILY
COMMUNITY

## 2025-01-08 RX ORDER — DULOXETIN HYDROCHLORIDE 30 MG/1
30 CAPSULE, DELAYED RELEASE ORAL DAILY
COMMUNITY
Start: 2025-01-07

## 2025-01-08 NOTE — PROGRESS NOTES
[facility] New Referral Office Note    HPI  Rosetta Loving, 69 y.o. female, presents to office as a new patient by PCP for evaluation of CKD 3 and recent RIAN.  Patient has a history of HTN, DM2, HLD, depression with anxiety.  Baseline creatinine ranging 0.8-1.0.  Noted to have elevation up to 1.41.  Her metformin was stopped.  And she was advised to increase her water intake.  On follow up her function was back to baseline.  She was prescribed Farxiga however she says she never started it because it was too expensive.  She denies any NSAID use however diclofenac was previously on her medication list.  No history of nephrolithiasis.  No  issues.  History of kidney disease or dialysis in her family  metformin, and Cymbalta.  She has no complaints.  Overall doing well    Patient denies taking NSAIDs or new antibiotics. Also denies recent episode of dehydration, diarrhea, vomiting, acute illness, hospitalization, recent angiograms or exposure to IV radiocontrast.        Medical Diagnoses:   Past Medical History:   Diagnosis Date    Diabetes mellitus, type 2     Hypertension      Patient Active Problem List   Diagnosis    Type 2 diabetes mellitus with hyperglycemia, without long-term current use of insulin    Class 1 drug-induced obesity without serious comorbidity with body mass index (BMI) of 31.0 to 31.9 in adult    Depression with anxiety    Mixed hyperlipidemia    Depression, recurrent    Hypertension       Surgical History:   Past Surgical History:   Procedure Laterality Date    APPENDECTOMY      Arm repair Left 10/09/2023    CHOLECYSTECTOMY  1994    Yes    COLONOSCOPY  1954    Dr. Elie Pickett    GALLBLADDER SURGERY      HIP SURGERY      HYSTERECTOMY      KNEE SURGERY      TONSILLECTOMY         Family History:   Family History   Problem Relation Name Age of Onset    Arthritis Mother Sara Mullinsro         Yes    Cancer Mother Sara Simmons         Breast    Depression Mother Sara Simmons          Yes    Hypertension Mother Sara Simmons         Yes    Mental illness Mother Sara Simmons         Yes    Diabetes Father ROM Simmons         Yes       Social History:   Social History     Tobacco Use    Smoking status: Never     Passive exposure: Never    Smokeless tobacco: Never    Tobacco comments:     Never   Substance Use Topics    Alcohol use: Yes     Alcohol/week: 1.0 standard drink of alcohol     Types: 1 Glasses of wine per week     Comment: Social       Allergies:  Review of patient's allergies indicates:  No Known Allergies    Medications:  Outpatient Medications Marked as Taking for the 1/8/25 encounter (Office Visit) with Miriam Quiñonez ACNP   Medication Sig Dispense Refill    berberine chloride 500 mg Cap Take 1 tablet by mouth 2 (two) times a day.      clonazePAM (KLONOPIN) 0.5 MG tablet Take 0.5 mg by mouth once daily.      cyanocobalamin (VITAMIN B-12) 100 MCG tablet Take 100 mcg by mouth once daily.      DULoxetine (CYMBALTA) 30 MG capsule Take 30 mg by mouth once daily.      lisinopriL-hydrochlorothiazide (PRINZIDE,ZESTORETIC) 20-12.5 mg per tablet Take 1 tablet by mouth once daily. 90 tablet 0    MELATONIN ORAL Take by mouth as needed.      multivit-min/FA/lycopen/lutein (SENTRY SENIOR ORAL) Take by mouth Daily.      ondansetron (ZOFRAN-ODT) 8 MG TbDL Take 8 mg by mouth as needed.      pantoprazole (PROTONIX) 40 MG tablet Take 1 tablet (40 mg total) by mouth once daily. (Patient taking differently: Take 40 mg by mouth as needed.) 90 tablet 0    pravastatin (PRAVACHOL) 40 MG tablet Take 1 tablet (40 mg total) by mouth once daily. 90 tablet 3       Review of Systems:    Constitutional: Denies fever, fatigue, generalized weakness  Skin: Denies wounds, no rashes, no itching, no new skin lesions  Respiratory:  Denies cough, shortness of breath, or wheezing  Cardiovascular: Denies chest pain, palpitations, or swelling  Gastrointestional: Denies abdominal pain, nausea, vomiting, diarrhea, or  "constipation  Genitourinary: Denies dysuria, hematuria, foamy urine, or incontinence; reports able to empty bladder  Musculoskeletal: Denies back or flank pain  Neurological: Denies headaches, dizziness, paresthesias, tremors or focal weakness      Vital Signs:  /80 (BP Location: Right arm, Patient Position: Sitting)   Pulse (!) 119   Temp 97.5 °F (36.4 °C) (Rectal)   Resp 20   Ht 5' 4" (1.626 m)   Wt 85.9 kg (189 lb 6.4 oz)   SpO2 99%   BMI 32.51 kg/m²   Body mass index is 32.51 kg/m².      Physical Exam:    General: no acute distress, awake, alert  Eyes: PERRLA, conjunctiva clear, eyelids without swelling  HENT: atraumatic, oropharynx and nasal mucosa patent  Neck: supple, trache midline, full ROM, no JVD, no thyromegaly or lymphadenopathy  Respiratory: equal, unlabored, clear to auscultation A/P  Cardiovascular: RRR without murmur or rub; radial and pedal pulses +2 BL  Edema: none  Gastrointestinal: soft, non-tender, non-distended; positive bowel sounds; no masses to palpation; no ascites  Genitourinary: no CVA tenderness upon palpation  Musculoskeletal: ROM without new limitation or discomfort  Integumentary: warm, dry; no rashes, wounds, or skin lesions  Neurological: oriented x4, appropriate, no acute deficits; no asterixis      Labs:        Component Value Date/Time     01/06/2025 0906     11/11/2024 0826     10/02/2023 1319     (L) 09/28/2023 1346    K 4.6 01/06/2025 0906    K 3.9 11/11/2024 0826    K 4.1 10/02/2023 1319    K 4.0 09/28/2023 1346     01/06/2025 0906     (H) 11/11/2024 0826     10/02/2023 1319     09/28/2023 1346    CO2 22 (L) 01/06/2025 0906    CO2 26 11/11/2024 0826    CO2 23 10/02/2023 1319    CO2 24 09/28/2023 1346    BUN 29.0 (H) 01/06/2025 0906    BUN 9.1 (L) 11/11/2024 0826    BUN 13 10/02/2023 1319    BUN 23 09/28/2023 1346    CREATININE 0.87 01/06/2025 0906    CREATININE 0.78 11/11/2024 0826    CREATININE 1.41 (H) 10/31/2024 " 1042    CREATININE 0.74 10/02/2023 1319    CREATININE 0.97 09/28/2023 1346    CREATININE 1.00 09/25/2023 1013    CALCIUM 8.9 01/06/2025 0906    CALCIUM 9.4 11/11/2024 0826    CALCIUM 7.9 (L) 10/02/2023 1319    CALCIUM 8.5 (L) 09/28/2023 1346    PHOS 3.6 01/06/2025 0906           Component Value Date/Time    WBC 7.27 01/06/2025 0906    WBC 7.23 10/31/2024 1042    HGB 13.1 01/06/2025 0906    HGB 14.3 10/31/2024 1042    HGB 9.4 (L) 10/03/2023 1248    HGB 9.7 (L) 10/02/2023 1319    HCT 39.1 01/06/2025 0906    HCT 43.5 10/31/2024 1042    HCT 28.4 (L) 10/03/2023 1248    HCT 28.2 (L) 10/02/2023 1319     01/06/2025 0906     10/31/2024 1042         Imaging:  Retroperitoneal US:      Impression:    1. Stage 2 chronic kidney disease    2. RIAN (acute kidney injury)    3. Primary hypertension    4. Type 2 diabetes mellitus with hyperglycemia, without long-term current use of insulin      RIAN likely secondary to hemodynamic effect of ACE inhibitor volume depletion.  Underlying CKD 2 due to nephrosclerosis.  No significant proteinuria maintained ACE inhibitor.    HTN-BP stable.    DM2 managed per PCP.  Most recent A1c 5.8.  Metformin was stopped in October when patient developed RIAN.  She is managing with diet alone.      Plan:  Renal ultrasound with next visit.  Stressed importance of adequate fluid intake, weight loss, glycemic control, hypertensive control in slowing CKD progression.    Avoid NSAIDs (Aleve, Mobic, Celebrex, Ibuprofen, Advil, Toradol and Diclofenac).  Only take tylenol occasionally if needed for aches/pains.    Follow up in 2 months.  If labs stable we can start seen every 6 months after that.    Patient to call our office with any concerns prior to next appointment.          Miriam Estes        This note was created with the assistance of [facility] voice recognition software or phone dictation. There may be transcription errors as a result of using this technology however minimal. Effort  has been made to assure accuracy of transcription but any obvious errors or omissions should be clarified with the author of the document.

## 2025-01-13 RX ORDER — ESCITALOPRAM OXALATE 10 MG/1
10 TABLET ORAL DAILY
Qty: 90 TABLET | Refills: 0 | OUTPATIENT
Start: 2025-01-13

## 2025-01-13 RX ORDER — ESCITALOPRAM OXALATE 10 MG/1
10 TABLET ORAL
COMMUNITY
Start: 2025-01-12 | End: 2025-01-13

## 2025-03-10 ENCOUNTER — LAB VISIT (OUTPATIENT)
Dept: LAB | Facility: HOSPITAL | Age: 70
End: 2025-03-10
Attending: NURSE PRACTITIONER
Payer: MEDICARE

## 2025-03-10 DIAGNOSIS — N18.2 STAGE 2 CHRONIC KIDNEY DISEASE: ICD-10-CM

## 2025-03-10 DIAGNOSIS — I10 PRIMARY HYPERTENSION: ICD-10-CM

## 2025-03-10 DIAGNOSIS — N17.9 AKI (ACUTE KIDNEY INJURY): ICD-10-CM

## 2025-03-10 DIAGNOSIS — E11.65 TYPE 2 DIABETES MELLITUS WITH HYPERGLYCEMIA, WITHOUT LONG-TERM CURRENT USE OF INSULIN: ICD-10-CM

## 2025-03-10 LAB
ALBUMIN SERPL-MCNC: 3.5 G/DL (ref 3.4–4.8)
ALBUMIN/GLOB SERPL: 1.3 RATIO (ref 1.1–2)
ALP SERPL-CCNC: 100 UNIT/L (ref 40–150)
ALT SERPL-CCNC: 9 UNIT/L (ref 0–55)
ANION GAP SERPL CALC-SCNC: 6 MEQ/L
AST SERPL-CCNC: 12 UNIT/L (ref 5–34)
BACTERIA #/AREA URNS AUTO: ABNORMAL /HPF
BASOPHILS # BLD AUTO: 0.11 X10(3)/MCL
BASOPHILS NFR BLD AUTO: 1.8 %
BILIRUB SERPL-MCNC: 0.4 MG/DL
BILIRUB UR QL STRIP.AUTO: NEGATIVE
BUN SERPL-MCNC: 16.8 MG/DL (ref 9.8–20.1)
CALCIUM SERPL-MCNC: 9.1 MG/DL (ref 8.4–10.2)
CHLORIDE SERPL-SCNC: 103 MMOL/L (ref 98–107)
CLARITY UR: CLEAR
CO2 SERPL-SCNC: 27 MMOL/L (ref 23–31)
COLOR UR AUTO: ABNORMAL
CREAT SERPL-MCNC: 0.84 MG/DL (ref 0.55–1.02)
CREAT UR-MCNC: 87.3 MG/DL (ref 45–106)
CREAT/UREA NIT SERPL: 20
EOSINOPHIL # BLD AUTO: 0.21 X10(3)/MCL (ref 0–0.9)
EOSINOPHIL NFR BLD AUTO: 3.5 %
ERYTHROCYTE [DISTWIDTH] IN BLOOD BY AUTOMATED COUNT: 13 % (ref 11.5–17)
GFR SERPLBLD CREATININE-BSD FMLA CKD-EPI: >60 ML/MIN/1.73/M2
GLOBULIN SER-MCNC: 2.6 GM/DL (ref 2.4–3.5)
GLUCOSE SERPL-MCNC: 97 MG/DL (ref 82–115)
GLUCOSE UR QL STRIP: NORMAL
HCT VFR BLD AUTO: 38 % (ref 37–47)
HGB BLD-MCNC: 12.6 G/DL (ref 12–16)
HGB UR QL STRIP: ABNORMAL
IMM GRANULOCYTES # BLD AUTO: 0.02 X10(3)/MCL (ref 0–0.04)
IMM GRANULOCYTES NFR BLD AUTO: 0.3 %
KETONES UR QL STRIP: NEGATIVE
LEUKOCYTE ESTERASE UR QL STRIP: 75
LYMPHOCYTES # BLD AUTO: 1.62 X10(3)/MCL (ref 0.6–4.6)
LYMPHOCYTES NFR BLD AUTO: 26.8 %
MCH RBC QN AUTO: 29.9 PG (ref 27–31)
MCHC RBC AUTO-ENTMCNC: 33.2 G/DL (ref 33–36)
MCV RBC AUTO: 90.3 FL (ref 80–94)
MONOCYTES # BLD AUTO: 0.55 X10(3)/MCL (ref 0.1–1.3)
MONOCYTES NFR BLD AUTO: 9.1 %
MUCOUS THREADS URNS QL MICRO: ABNORMAL /LPF
NEUTROPHILS # BLD AUTO: 3.53 X10(3)/MCL (ref 2.1–9.2)
NEUTROPHILS NFR BLD AUTO: 58.5 %
NITRITE UR QL STRIP: NEGATIVE
NRBC BLD AUTO-RTO: 0 %
PH UR STRIP: 5.5 [PH]
PLATELET # BLD AUTO: 322 X10(3)/MCL (ref 130–400)
PMV BLD AUTO: 9.5 FL (ref 7.4–10.4)
POTASSIUM SERPL-SCNC: 4.8 MMOL/L (ref 3.5–5.1)
PROT SERPL-MCNC: 6.1 GM/DL (ref 5.8–7.6)
PROT UR QL STRIP: NEGATIVE
PROT UR STRIP-MCNC: 7 MG/DL
PTH-INTACT SERPL-MCNC: 51.4 PG/ML (ref 8.7–77)
RBC # BLD AUTO: 4.21 X10(6)/MCL (ref 4.2–5.4)
RBC #/AREA URNS AUTO: ABNORMAL /HPF
SODIUM SERPL-SCNC: 136 MMOL/L (ref 136–145)
SP GR UR STRIP.AUTO: 1.01 (ref 1–1.03)
SQUAMOUS #/AREA URNS LPF: ABNORMAL /HPF
URINE PROTEIN/CREATININE RATIO (OLG): 0.1
UROBILINOGEN UR STRIP-ACNC: NORMAL
WBC # BLD AUTO: 6.04 X10(3)/MCL (ref 4.5–11.5)
WBC #/AREA URNS AUTO: ABNORMAL /HPF

## 2025-03-10 PROCEDURE — 36415 COLL VENOUS BLD VENIPUNCTURE: CPT

## 2025-03-10 PROCEDURE — 80053 COMPREHEN METABOLIC PANEL: CPT

## 2025-03-10 PROCEDURE — 82570 ASSAY OF URINE CREATININE: CPT

## 2025-03-10 PROCEDURE — 85025 COMPLETE CBC W/AUTO DIFF WBC: CPT

## 2025-03-10 PROCEDURE — 83970 ASSAY OF PARATHORMONE: CPT

## 2025-03-10 PROCEDURE — 81015 MICROSCOPIC EXAM OF URINE: CPT

## 2025-03-18 ENCOUNTER — OFFICE VISIT (OUTPATIENT)
Dept: NEPHROLOGY | Facility: CLINIC | Age: 70
End: 2025-03-18
Payer: MEDICARE

## 2025-03-18 VITALS
WEIGHT: 194.63 LBS | BODY MASS INDEX: 33.23 KG/M2 | TEMPERATURE: 98 F | HEART RATE: 106 BPM | OXYGEN SATURATION: 97 % | SYSTOLIC BLOOD PRESSURE: 146 MMHG | RESPIRATION RATE: 20 BRPM | HEIGHT: 64 IN | DIASTOLIC BLOOD PRESSURE: 86 MMHG

## 2025-03-18 DIAGNOSIS — E11.65 TYPE 2 DIABETES MELLITUS WITH HYPERGLYCEMIA, WITHOUT LONG-TERM CURRENT USE OF INSULIN: ICD-10-CM

## 2025-03-18 DIAGNOSIS — N18.2 STAGE 2 CHRONIC KIDNEY DISEASE: Primary | ICD-10-CM

## 2025-03-18 DIAGNOSIS — I10 PRIMARY HYPERTENSION: ICD-10-CM

## 2025-03-18 DIAGNOSIS — N17.9 AKI (ACUTE KIDNEY INJURY): ICD-10-CM

## 2025-03-18 DIAGNOSIS — E66.811 CLASS 1 OBESITY WITH BODY MASS INDEX (BMI) OF 33.0 TO 33.9 IN ADULT, UNSPECIFIED OBESITY TYPE, UNSPECIFIED WHETHER SERIOUS COMORBIDITY PRESENT: ICD-10-CM

## 2025-03-18 PROCEDURE — 99999 PR PBB SHADOW E&M-EST. PATIENT-LVL IV: CPT | Mod: PBBFAC,,, | Performed by: NURSE PRACTITIONER

## 2025-03-18 PROCEDURE — 3079F DIAST BP 80-89 MM HG: CPT | Mod: CPTII,S$GLB,, | Performed by: NURSE PRACTITIONER

## 2025-03-18 PROCEDURE — 3008F BODY MASS INDEX DOCD: CPT | Mod: CPTII,S$GLB,, | Performed by: NURSE PRACTITIONER

## 2025-03-18 PROCEDURE — 3288F FALL RISK ASSESSMENT DOCD: CPT | Mod: CPTII,S$GLB,, | Performed by: NURSE PRACTITIONER

## 2025-03-18 PROCEDURE — 1101F PT FALLS ASSESS-DOCD LE1/YR: CPT | Mod: CPTII,S$GLB,, | Performed by: NURSE PRACTITIONER

## 2025-03-18 PROCEDURE — 1159F MED LIST DOCD IN RCRD: CPT | Mod: CPTII,S$GLB,, | Performed by: NURSE PRACTITIONER

## 2025-03-18 PROCEDURE — 3066F NEPHROPATHY DOC TX: CPT | Mod: CPTII,S$GLB,, | Performed by: NURSE PRACTITIONER

## 2025-03-18 PROCEDURE — 99214 OFFICE O/P EST MOD 30 MIN: CPT | Mod: S$GLB,,, | Performed by: NURSE PRACTITIONER

## 2025-03-18 PROCEDURE — 1126F AMNT PAIN NOTED NONE PRSNT: CPT | Mod: CPTII,S$GLB,, | Performed by: NURSE PRACTITIONER

## 2025-03-18 PROCEDURE — 3077F SYST BP >= 140 MM HG: CPT | Mod: CPTII,S$GLB,, | Performed by: NURSE PRACTITIONER

## 2025-03-18 RX ORDER — CLONAZEPAM 1 MG/1
1 TABLET ORAL 2 TIMES DAILY PRN
COMMUNITY
Start: 2025-03-06

## 2025-03-18 NOTE — PROGRESS NOTES
[facility]    HPI  Rosetta Loving, 69 y.o. female, presents to office for follow up of RIAN and CKD 2.  Patient has a history of HTN, DM2, HLD, depression with anxiety.  Renal function has been stable since discontinuation of metformin and patient has increased her water intake.  Overall she is doing well.  She has no complaints.  No  symptoms.  She has not required any medications for her diabetes.  Of note she is on lisinopril/HCTZ.    Patient denies taking NSAIDs or new antibiotics. Also denies recent episode of dehydration, diarrhea, vomiting, acute illness, hospitalization, recent angiograms or exposure to IV radiocontrast.        Medical Diagnoses:   Past Medical History:   Diagnosis Date    Diabetes mellitus, type 2     Hypertension      Patient Active Problem List   Diagnosis    Type 2 diabetes mellitus with hyperglycemia, without long-term current use of insulin    Class 1 drug-induced obesity without serious comorbidity with body mass index (BMI) of 31.0 to 31.9 in adult    Depression with anxiety    Mixed hyperlipidemia    Depression, recurrent    Hypertension       Surgical History:   Past Surgical History:   Procedure Laterality Date    APPENDECTOMY      Arm repair Left 10/09/2023    CHOLECYSTECTOMY  1994    Yes    COLONOSCOPY  1954    Dr. Elie Pickett    GALLBLADDER SURGERY      HIP SURGERY      HYSTERECTOMY      KNEE SURGERY      TONSILLECTOMY         Family History:   Family History   Problem Relation Name Age of Onset    Arthritis Mother Sara Simmons         Yes    Cancer Mother Sara Simmons         Breast    Depression Mother Sara Mullinsro         Yes    Hypertension Mother Sara Simmons         Yes    Mental illness Mother Sara Simmons         Yes    Diabetes Father AD Simmons         Yes       Social History:   Social History     Tobacco Use    Smoking status: Never     Passive exposure: Never    Smokeless tobacco: Never    Tobacco comments:     Never  "  Substance Use Topics    Alcohol use: Yes     Alcohol/week: 1.0 standard drink of alcohol     Types: 1 Glasses of wine per week     Comment: Social       Allergies:  Review of patient's allergies indicates:  No Known Allergies    Medications:  Outpatient Medications Marked as Taking for the 3/18/25 encounter (Office Visit) with Miriam Quiñonez ACNP   Medication Sig Dispense Refill    berberine chloride 500 mg Cap Take 1 tablet by mouth 2 (two) times a day. (Patient taking differently: Take 2 tablets by mouth 2 (two) times a day.)      clonazePAM (KLONOPIN) 1 MG tablet Take 1 mg by mouth 2 (two) times daily as needed.      DULoxetine (CYMBALTA) 30 MG capsule Take 30 mg by mouth once daily.      lisinopriL-hydrochlorothiazide (PRINZIDE,ZESTORETIC) 20-12.5 mg per tablet Take 1 tablet by mouth once daily. 90 tablet 0    ondansetron (ZOFRAN-ODT) 8 MG TbDL Take 8 mg by mouth as needed.      pantoprazole (PROTONIX) 40 MG tablet Take 1 tablet (40 mg total) by mouth once daily. (Patient taking differently: Take 40 mg by mouth as needed.) 90 tablet 0       Review of Systems:    Constitutional: Denies fever, fatigue, generalized weakness  Skin: Denies wounds, no rashes, no itching, no new skin lesions  Respiratory:  Denies cough, shortness of breath, or wheezing  Cardiovascular: Denies chest pain, palpitations, or swelling  Gastrointestional: Denies abdominal pain, nausea, vomiting, diarrhea, or constipation  Genitourinary: Denies dysuria, hematuria, foamy urine, or incontinence; reports able to empty bladder  Musculoskeletal: Denies back or flank pain  Neurological: Denies headaches, dizziness, paresthesias, tremors or focal weakness      Vital Signs:  BP (!) 146/86 (BP Location: Left arm, Patient Position: Sitting)   Pulse 106   Temp 98 °F (36.7 °C) (Oral)   Resp 20   Ht 5' 4" (1.626 m)   Wt 88.3 kg (194 lb 9.6 oz)   SpO2 97%   BMI 33.40 kg/m²   Body mass index is 33.4 kg/m².      Physical Exam:    General: no " acute distress, awake, alert  Eyes: PERRLA, conjunctiva clear, eyelids without swelling  HENT: atraumatic, oropharynx and nasal mucosa patent  Neck: supple, trache midline, full ROM, no JVD, no thyromegaly or lymphadenopathy  Respiratory: equal, unlabored, clear to auscultation A/P  Cardiovascular: RRR without murmur or rub  Edema: none  Gastrointestinal: soft, non-tender, non-distended; positive bowel sounds; no masses to palpation; no ascites  Genitourinary: no CVA tenderness upon palpation  Musculoskeletal: ROM without new limitation or discomfort  Integumentary: warm, dry; no rashes, wounds, or skin lesions  Neurological: oriented x4, appropriate, no acute deficits; no asterixis      Labs:        Component Value Date/Time     03/10/2025 0932     01/06/2025 0906     10/02/2023 1319     (L) 09/28/2023 1346    K 4.8 03/10/2025 0932    K 4.6 01/06/2025 0906    K 4.1 10/02/2023 1319    K 4.0 09/28/2023 1346     03/10/2025 0932     01/06/2025 0906     10/02/2023 1319     09/28/2023 1346    CO2 27 03/10/2025 0932    CO2 22 (L) 01/06/2025 0906    CO2 23 10/02/2023 1319    CO2 24 09/28/2023 1346    BUN 16.8 03/10/2025 0932    BUN 29.0 (H) 01/06/2025 0906    BUN 13 10/02/2023 1319    BUN 23 09/28/2023 1346    CREATININE 0.84 03/10/2025 0932    CREATININE 0.87 01/06/2025 0906    CREATININE 0.78 11/11/2024 0826    CREATININE 1.41 (H) 10/31/2024 1042    CREATININE 0.74 10/02/2023 1319    CREATININE 0.97 09/28/2023 1346    CALCIUM 9.1 03/10/2025 0932    CALCIUM 8.9 01/06/2025 0906    CALCIUM 7.9 (L) 10/02/2023 1319    CALCIUM 8.5 (L) 09/28/2023 1346    PHOS 3.6 01/06/2025 0906    PTH 51.4 03/10/2025 0932           Component Value Date/Time    WBC 6.04 03/10/2025 0932    WBC 7.27 01/06/2025 0906    HGB 12.6 03/10/2025 0932    HGB 13.1 01/06/2025 0906    HGB 9.4 (L) 10/03/2023 1248    HGB 9.7 (L) 10/02/2023 1319    HCT 38.0 03/10/2025 0932    HCT 39.1 01/06/2025 0906    HCT 28.4  (L) 10/03/2023 1248    HCT 28.2 (L) 10/02/2023 1319     03/10/2025 0932     01/06/2025 0906         Imaging:  Retroperitoneal US:  Impression:     No acute disease or significant abnormality identified at the kidneys or bladder.        Electronically signed by:Polorigoberto Gustafson  Date:                                            03/11/2025  Time:                                           10:35        Impression:    1. Stage 2 chronic kidney disease    2. RIAN (acute kidney injury)    3. Primary hypertension    4. Type 2 diabetes mellitus with hyperglycemia, without long-term current use of insulin    5. Class 1 obesity with body mass index (BMI) of 33.0 to 33.9 in adult, unspecified obesity type, unspecified whether serious comorbidity present      RIAN resolved.   No significant proteinuria maintained ACE inhibitor.    BP mildly elevated today however patient reports she was nervous about appointment.  Recent blood pressure in epic WNL.    DM2 managed per PCP.  Most recent A1c 5.8.  Metformin was stopped in October when patient developed RIAN.  She is managing with diet alone.      Plan:  Continue current regimen  Stressed importance of adequate fluid intake, weight loss, glycemic control, hypertensive control in slowing CKD progression.  Continue home BP monitoring.  Advised her to follow up if blood pressure sustain greater than 160/80.  Discussed low-sodium diet.  Avoid NSAIDs (Aleve, Mobic, Celebrex, Ibuprofen, Advil, Toradol and Diclofenac).  Only take tylenol occasionally if needed for aches/pains.    Follow up in 6 months.    Patient to call our office with any concerns prior to next appointment.          Miriam Estes        This note was created with the assistance of [employer] voice recognition software or phone dictation. There may be transcription errors as a result of using this technology however minimal. Effort has been made to assure accuracy of transcription but any obvious errors or  omissions should be clarified with the author of the document.

## 2025-06-10 ENCOUNTER — TELEPHONE (OUTPATIENT)
Dept: FAMILY MEDICINE | Facility: CLINIC | Age: 70
End: 2025-06-10
Payer: MEDICARE

## 2025-06-10 NOTE — TELEPHONE ENCOUNTER
Copied from CRM #[id number]. Topic: Appointments - Amb Referral  >> Reed 10, 2025  4:01 PM Carol wrote:  Who Called: Rosetta Loving    Caller is requesting to schedule their annual mammogram appointment. Order is not listed in Epic. Please enter order and contact patient to schedule.    Where would they like the mammogram performed? [facility]     Preferred Method of Contact: Phone Call  Patient's Preferred Phone Number on File: [phone]   Best Call Back Number, if different:  Additional Information: Patient is requesting labs be sent along with mammogram order. States she's like lab for her kidney function as well.

## 2025-06-10 NOTE — TELEPHONE ENCOUNTER
Copied from CRM #[id number]. Topic: General Inquiry - Patient Advice  >> Reed 10, 2025  9:02 AM Anabel wrote:  Who Called: Rosetta Loving    Caller is requesting assistance/information from provider's office.    Symptoms (please be specific):     How long has patient had these symptoms:     List of preferred pharmacies on file (remove unneeded): [unfilled]  If different, enter pharmacy into here including location and phone number:        Preferred Method of Contact: Phone Call  Patient's Preferred Phone Number on File: [phone]   Best Call Back Number, if different:  Additional Information:  pt would like to speak with nurse about paperwork

## 2025-06-12 ENCOUNTER — OFFICE VISIT (OUTPATIENT)
Dept: FAMILY MEDICINE | Facility: CLINIC | Age: 70
End: 2025-06-12
Payer: MEDICARE

## 2025-06-12 ENCOUNTER — TELEPHONE (OUTPATIENT)
Dept: FAMILY MEDICINE | Facility: CLINIC | Age: 70
End: 2025-06-12
Payer: MEDICARE

## 2025-06-12 ENCOUNTER — PATIENT MESSAGE (OUTPATIENT)
Dept: FAMILY MEDICINE | Facility: CLINIC | Age: 70
End: 2025-06-12

## 2025-06-12 ENCOUNTER — CLINICAL SUPPORT (OUTPATIENT)
Dept: FAMILY MEDICINE | Facility: CLINIC | Age: 70
End: 2025-06-12
Payer: MEDICARE

## 2025-06-12 ENCOUNTER — TELEPHONE (OUTPATIENT)
Dept: FAMILY MEDICINE | Facility: CLINIC | Age: 70
End: 2025-06-12

## 2025-06-12 VITALS
HEART RATE: 112 BPM | TEMPERATURE: 99 F | BODY MASS INDEX: 34.31 KG/M2 | WEIGHT: 201 LBS | SYSTOLIC BLOOD PRESSURE: 110 MMHG | RESPIRATION RATE: 18 BRPM | HEIGHT: 64 IN | OXYGEN SATURATION: 98 % | DIASTOLIC BLOOD PRESSURE: 75 MMHG

## 2025-06-12 DIAGNOSIS — Z12.31 BREAST CANCER SCREENING BY MAMMOGRAM: ICD-10-CM

## 2025-06-12 DIAGNOSIS — I10 PRIMARY HYPERTENSION: ICD-10-CM

## 2025-06-12 DIAGNOSIS — F33.1 MODERATE EPISODE OF RECURRENT MAJOR DEPRESSIVE DISORDER: ICD-10-CM

## 2025-06-12 DIAGNOSIS — E66.811 CLASS 1 OBESITY DUE TO EXCESS CALORIES WITH SERIOUS COMORBIDITY AND BODY MASS INDEX (BMI) OF 34.0 TO 34.9 IN ADULT: ICD-10-CM

## 2025-06-12 DIAGNOSIS — E66.09 CLASS 1 OBESITY DUE TO EXCESS CALORIES WITH SERIOUS COMORBIDITY AND BODY MASS INDEX (BMI) OF 34.0 TO 34.9 IN ADULT: ICD-10-CM

## 2025-06-12 DIAGNOSIS — Z11.59 ENCOUNTER FOR HEPATITIS C SCREENING TEST FOR LOW RISK PATIENT: ICD-10-CM

## 2025-06-12 DIAGNOSIS — E11.65 TYPE 2 DIABETES MELLITUS WITH HYPERGLYCEMIA, WITHOUT LONG-TERM CURRENT USE OF INSULIN: Primary | ICD-10-CM

## 2025-06-12 DIAGNOSIS — R00.0 TACHYCARDIA: ICD-10-CM

## 2025-06-12 DIAGNOSIS — F41.1 GAD (GENERALIZED ANXIETY DISORDER): ICD-10-CM

## 2025-06-12 DIAGNOSIS — E78.2 MIXED HYPERLIPIDEMIA: ICD-10-CM

## 2025-06-12 DIAGNOSIS — E11.65 TYPE 2 DIABETES MELLITUS WITH HYPERGLYCEMIA, WITHOUT LONG-TERM CURRENT USE OF INSULIN: ICD-10-CM

## 2025-06-12 PROBLEM — N18.32 STAGE 3B CHRONIC KIDNEY DISEASE: Status: RESOLVED | Noted: 2025-06-12 | Resolved: 2025-06-12

## 2025-06-12 PROBLEM — N18.32 STAGE 3B CHRONIC KIDNEY DISEASE: Status: ACTIVE | Noted: 2025-06-12

## 2025-06-12 LAB
ALBUMIN SERPL-MCNC: 3.8 G/DL (ref 3.4–4.8)
ALBUMIN/GLOB SERPL: 1.1 RATIO (ref 1.1–2)
ALP SERPL-CCNC: 100 UNIT/L (ref 40–150)
ALT SERPL-CCNC: 9 UNIT/L (ref 0–55)
ANION GAP SERPL CALC-SCNC: 11 MEQ/L
AST SERPL-CCNC: 15 UNIT/L (ref 11–45)
BASOPHILS # BLD AUTO: 0.08 X10(3)/MCL
BASOPHILS NFR BLD AUTO: 1 %
BILIRUB SERPL-MCNC: 0.4 MG/DL
BUN SERPL-MCNC: 25.5 MG/DL (ref 9.8–20.1)
CALCIUM SERPL-MCNC: 9.1 MG/DL (ref 8.4–10.2)
CHLORIDE SERPL-SCNC: 99 MMOL/L (ref 98–107)
CO2 SERPL-SCNC: 26 MMOL/L (ref 23–31)
CREAT SERPL-MCNC: 0.87 MG/DL (ref 0.55–1.02)
CREAT/UREA NIT SERPL: 29
EOSINOPHIL # BLD AUTO: 0.19 X10(3)/MCL (ref 0–0.9)
EOSINOPHIL NFR BLD AUTO: 2.4 %
ERYTHROCYTE [DISTWIDTH] IN BLOOD BY AUTOMATED COUNT: 13.3 % (ref 11.5–17)
EST. AVERAGE GLUCOSE BLD GHB EST-MCNC: 119.8 MG/DL
GFR SERPLBLD CREATININE-BSD FMLA CKD-EPI: >60 ML/MIN/1.73/M2
GLOBULIN SER-MCNC: 3.4 GM/DL (ref 2.4–3.5)
GLUCOSE SERPL-MCNC: 98 MG/DL (ref 82–115)
HBA1C MFR BLD: 5.8 %
HCT VFR BLD AUTO: 39.9 % (ref 37–47)
HGB BLD-MCNC: 12.8 G/DL (ref 12–16)
IMM GRANULOCYTES # BLD AUTO: 0.02 X10(3)/MCL (ref 0–0.04)
IMM GRANULOCYTES NFR BLD AUTO: 0.3 %
LYMPHOCYTES # BLD AUTO: 2.53 X10(3)/MCL (ref 0.6–4.6)
LYMPHOCYTES NFR BLD AUTO: 31.6 %
MAGNESIUM SERPL-MCNC: 1.9 MG/DL (ref 1.6–2.6)
MCH RBC QN AUTO: 29.3 PG (ref 27–31)
MCHC RBC AUTO-ENTMCNC: 32.1 G/DL (ref 33–36)
MCV RBC AUTO: 91.3 FL (ref 80–94)
MONOCYTES # BLD AUTO: 0.66 X10(3)/MCL (ref 0.1–1.3)
MONOCYTES NFR BLD AUTO: 8.3 %
NEUTROPHILS # BLD AUTO: 4.52 X10(3)/MCL (ref 2.1–9.2)
NEUTROPHILS NFR BLD AUTO: 56.4 %
NRBC BLD AUTO-RTO: 0 %
PHOSPHATE SERPL-MCNC: 4.6 MG/DL (ref 2.3–4.7)
PLATELET # BLD AUTO: 323 X10(3)/MCL (ref 130–400)
PMV BLD AUTO: 9.3 FL (ref 7.4–10.4)
POTASSIUM SERPL-SCNC: 4.7 MMOL/L (ref 3.5–5.1)
PROT SERPL-MCNC: 7.2 GM/DL (ref 5.8–7.6)
PTH-INTACT SERPL-MCNC: 45 PG/ML (ref 8.7–77)
RBC # BLD AUTO: 4.37 X10(6)/MCL (ref 4.2–5.4)
SODIUM SERPL-SCNC: 136 MMOL/L (ref 136–145)
WBC # BLD AUTO: 8 X10(3)/MCL (ref 4.5–11.5)

## 2025-06-12 PROCEDURE — 85025 COMPLETE CBC W/AUTO DIFF WBC: CPT | Performed by: STUDENT IN AN ORGANIZED HEALTH CARE EDUCATION/TRAINING PROGRAM

## 2025-06-12 PROCEDURE — 86803 HEPATITIS C AB TEST: CPT | Performed by: STUDENT IN AN ORGANIZED HEALTH CARE EDUCATION/TRAINING PROGRAM

## 2025-06-12 PROCEDURE — 83735 ASSAY OF MAGNESIUM: CPT | Performed by: STUDENT IN AN ORGANIZED HEALTH CARE EDUCATION/TRAINING PROGRAM

## 2025-06-12 PROCEDURE — 80053 COMPREHEN METABOLIC PANEL: CPT | Performed by: STUDENT IN AN ORGANIZED HEALTH CARE EDUCATION/TRAINING PROGRAM

## 2025-06-12 PROCEDURE — 84100 ASSAY OF PHOSPHORUS: CPT | Performed by: STUDENT IN AN ORGANIZED HEALTH CARE EDUCATION/TRAINING PROGRAM

## 2025-06-12 RX ORDER — VIT C/E/ZN/COPPR/LUTEIN/ZEAXAN 250MG-90MG
500 CAPSULE ORAL DAILY
COMMUNITY

## 2025-06-12 RX ORDER — METOPROLOL SUCCINATE 25 MG/1
25 TABLET, EXTENDED RELEASE ORAL DAILY
Qty: 90 TABLET | Refills: 3 | Status: SHIPPED | OUTPATIENT
Start: 2025-06-12 | End: 2026-06-12

## 2025-06-12 RX ORDER — ROSUVASTATIN CALCIUM 5 MG/1
5 TABLET, COATED ORAL DAILY
Qty: 90 TABLET | Refills: 3 | Status: SHIPPED | OUTPATIENT
Start: 2025-06-12 | End: 2026-06-12

## 2025-06-12 NOTE — TELEPHONE ENCOUNTER
Copied from CRM #[id number]. Topic: General Inquiry - Patient Advice  >> Jun 12, 2025 11:21 AM Jeromy wrote:  Who Called: Rosetta Loving    Caller is requesting assistance/information from provider's office.    Symptoms (please be specific):    How long has patient had these symptoms:    List of preferred pharmacies on file (remove unneeded): [unfilled]  If different, enter pharmacy into here including location and phone number:       Preferred Method of Contact: Phone Call  Patient's Preferred Phone Number on File: [phone]   Best Call Back Number, if different:  Additional Information: pt called to speak with nurse pt is requesting to start taking monjuaro due to she's a diabetic and stated  took her off her other meds pls advise

## 2025-06-12 NOTE — PROGRESS NOTES
Patient ID: [id number]     Chief Complaint: Diabetes        HPI:      Disclaimer:  This note is prepared using voice recognition software and as such is likely to have errors despite attempts at proofreading. Please contact me for questions.     Rosetta Loving is a 69 y.o. female here today for the following    Acute Issues-  Patient reports that she is depressed.  Following a psychiatrist.  Can not get over from the grief from her  .  Pulse is 112.  She lost to follow up with the Cardiology      Diabetes mellitus  Not on any medications as of now   A1c pending    Anxiety   Depression   Not well-controlled   Seeing a psychiatry    Hyperlipidemia   Currently not on any med  Chronic Issues-    -------------------------------------    Diabetes mellitus, type 2    Hypertension        Past Surgical History:   Procedure Laterality Date    APPENDECTOMY      Arm repair Left 10/09/2023    CHOLECYSTECTOMY      Yes    COLONOSCOPY  1954    Dr. Elie Pickett    GALLBLADDER SURGERY      HIP SURGERY      HYSTERECTOMY      KNEE SURGERY      TONSILLECTOMY         Review of patient's allergies indicates:  No Known Allergies    Current Outpatient Medications   Medication Instructions    ashwagandha-citicoline-bacopa 125-125-150 mg Cap Take by mouth.    berberine chloride 500 mg Cap 1 tablet, 2 times daily    calcium carb-mag hydrox-simeth 1,200 mg-270 mg -80 mg/10 mL Susp Take by mouth.    clonazePAM (KLONOPIN) 0.5 mg, Daily    clonazePAM (KLONOPIN) 1 mg, 2 times daily PRN    cyanocobalamin 500 mcg, Daily    DULoxetine (CYMBALTA) 30 mg, Daily    lisinopriL-hydrochlorothiazide (PRINZIDE,ZESTORETIC) 20-12.5 mg per tablet 1 tablet, Oral, Daily    MELATONIN ORAL As needed (PRN)    multivit-min/FA/lycopen/lutein (SENTRY SENIOR ORAL) Daily    ondansetron (ZOFRAN-ODT) 8 mg, As needed (PRN)    pantoprazole (PROTONIX) 40 mg, Oral, Daily       Social History[1]     Family History   Problem Relation Name Age  "of Onset    Arthritis Mother Sara Simmons         Yes    Cancer Mother Sara Simmons         Breast    Depression Mother Sara Simmons         Yes    Hypertension Mother Sara Simmons         Yes    Mental illness Mother Sara Simmons         Yes    Diabetes Father ROM Simmons         Yes        Patient Care Team:  Sonya Vela MD as PCP - General (Family Medicine)  Jani Murphy MD as Consulting Physician (Nephrology)  Miriam Quiñonez ACNP as Nurse Practitioner (Nephrology)  Soni Lofton DO as Consulting Physician (Nephrology)  All Smith FNP as Nurse Practitioner (Nephrology)     Subjective:     ROS    See HPI for details    Constitutional: Denies Change in appetite. Denies Chills. Denies Fever. Denies Night sweats.  Respiratory: Denies Cough. Denies Shortness of breath. Denies Shortness of breath with exertion. Denies Wheezing.  Cardiovascular: DeniesChest pain at rest. Denies Chest pain with exertion. Denies Irregular heartbeat. Denies Palpitations. Denies Edema.  Gastrointestinal: Denies Abdominal pain. DeniesDiarrhea. Denies Nausea. Denies Vomiting. Denies Hematemesis or Hematochezia.  Genitourinary: Denies Dysuria. Denies Urinary frequency. Denies Urinary urgency. Denies Blood in urine.  Psychiatric: Denies Depression. Denies Anxiety. Denies Suicidal/Homicidal ideations.    All Other ROS: Negative except as stated in HPI.  Objective:     Visit Vitals  /75 (BP Location: Left arm, Patient Position: Sitting)   Pulse (!) 112   Temp 98.8 °F (37.1 °C) (Oral)   Resp 18   Ht 5' 4" (1.626 m)   Wt 91.2 kg (201 lb)   SpO2 98%   BMI 34.50 kg/m²       Physical Exam    General: Alert and oriented, obese, No acute distress.  Respiratory: Clear to auscultation bilaterally; No wheezes, rales or rhonchi,Non-labored respirations, Symmetrical chest wall expansion.  Cardiovascular: Regular rate and rhythm, S1/S2 normal, No murmurs, rubs or gallops.  Gastrointestinal: Soft, Non-tender, " Non-distended, Normal bowel sounds, No palpable organomegaly.  Psychiatric: Normal interaction, Coherent speech, Euthymic mood, Appropriate affect   Assessment:       ICD-10-CM ICD-9-CM   1. Tachycardia  R00.0 785.0   2. Type 2 diabetes mellitus with hyperglycemia, without long-term current use of insulin  E11.65 250.00     790.29   3. Primary hypertension  I10 401.9   4. Breast cancer screening by mammogram  Z12.31 V76.12   5. Moderate episode of recurrent major depressive disorder  F33.1 296.32   6. Class 1 obesity due to excess calories with serious comorbidity and body mass index (BMI) of 34.0 to 34.9 in adult  E66.811 278.00    E66.09 V85.34    Z68.34    7. SWATHI (generalized anxiety disorder)  F41.1 300.02   8. Encounter for hepatitis C screening test for low risk patient  Z11.59 V73.89   9. Mixed hyperlipidemia  E78.2 272.2        1. Tachycardia  Patient has been tachycardic   EKG ordered   Referral to Cardiology made   Start metoprolol as prescribed  -     EKG 12-lead; Future  -     CBC Auto Differential; Future; Expected date: 06/12/2025  -     Comprehensive Metabolic Panel; Future; Expected date: 06/12/2025  -     Urinalysis; Future; Expected date: 06/12/2025  -     Microalbumin/Creatinine Ratio, Urine; Future; Expected date: 06/12/2025  -     Magnesium; Future; Expected date: 06/12/2025  -     Phosphorus; Future; Expected date: 06/12/2025  -     Ambulatory referral/consult to Cardiology; Future; Expected date: 06/19/2025  -     metoprolol succinate (TOPROL-XL) 25 MG 24 hr tablet; Take 1 tablet (25 mg total) by mouth once daily.  Dispense: 90 tablet; Refill: 3    2. Type 2 diabetes mellitus with hyperglycemia, without long-term current use of insulin  Continue diabetic diet with 35 minutes of  A1 pending  -     Cancel: Diabetic Eye Screening Photo  -     PTH, Intact  -     Cancel: Diabetic Eye Screening Photo  -     Diabetic Eye Screening Photo; Future  -     Hemoglobin A1C; Future; Expected date:  06/12/2025    3. Primary hypertension  Recommend to continue Rx as prescribed   Keep goal blood pressure less than 130/80  Continue 35 minutes of brisk walking, 5 days in a week  Continue low sodium Diet (DASH Diet - Less than 2 grams of sodium per day).  Recommend to quit smoking if smoking  Maintain healthy weight with goal BMI <25.    -     PTH, Intact    4. Breast cancer screening by mammogram  -     Mammo Digital Screening Bilat w/ Janusz (XPD); Future; Expected date: 06/12/2025    5. Moderate episode of recurrent major depressive disorder  6. SWATHI (generalized anxiety disorder)  Continue medications as prescribed   Start mental health exercises like yoga   Keep scheduled visit with    7. Class 1 obesity due to excess calories with serious comorbidity and body mass index (BMI) of 34.0 to 34.9 in adult  Body mass index is 34.5 kg/m².  Goal BMI <30.  Exercise 5 times a week for 30 minutes per day.  Avoid soda, simple sugars, excessive rice, potatoes or bread. Limit fast foods and fried foods.  Choose complex carbs in moderation (example: green vegetables, beans, oatmeal). Eat plenty of fresh fruits and vegetables with lean meats daily.  Do not skip meals. Eat a balanced portion size.  Avoid fad diets. Consider permanent healthy life style changes.     8. Encounter for hepatitis C screening test for low risk patient  -     Hepatitis C Antibody; Future; Expected date: 06/12/2025    9. Mixed hyperlipidemia  -start     rosuvastatin (CRESTOR) 5 MG tablet; Take 1 tablet (5 mg total) by mouth once daily.  Dispense: 90 tablet; Refill: 3  Continue medication      Medication List with Changes/Refills   Current Medications    ASHWAGANDHA-CITICOLINE-BACOPA 125-125-150 MG CAP    Take by mouth.       Start Date: --        End Date: --    BERBERINE CHLORIDE 500 MG CAP    Take 1 tablet by mouth 2 (two) times a day.       Start Date: --        End Date: --    CALCIUM CARB-MAG HYDROX-SIMETH 1,200 MG-270 MG -80 MG/10 ML SUSP    Take  by mouth.       Start Date: --        End Date: --    CLONAZEPAM (KLONOPIN) 0.5 MG TABLET    Take 0.5 mg by mouth once daily.       Start Date: 10/7/2024 End Date: --    CLONAZEPAM (KLONOPIN) 1 MG TABLET    Take 1 mg by mouth 2 (two) times daily as needed.       Start Date: 3/6/2025  End Date: --    CYANOCOBALAMIN 500 MCG TABLET    Take 500 mcg by mouth once daily.       Start Date: --        End Date: --    DULOXETINE (CYMBALTA) 30 MG CAPSULE    Take 30 mg by mouth once daily.       Start Date: 1/7/2025  End Date: --    LISINOPRIL-HYDROCHLOROTHIAZIDE (PRINZIDE,ZESTORETIC) 20-12.5 MG PER TABLET    Take 1 tablet by mouth once daily.       Start Date: 9/18/2024 End Date: 6/12/2025    MELATONIN ORAL    Take by mouth as needed.       Start Date: --        End Date: --    MULTIVIT-MIN/FA/LYCOPEN/LUTEIN (SENTRY SENIOR ORAL)    Take by mouth Daily.       Start Date: --        End Date: --    ONDANSETRON (ZOFRAN-ODT) 8 MG TBDL    Take 8 mg by mouth as needed.       Start Date: --        End Date: --    PANTOPRAZOLE (PROTONIX) 40 MG TABLET    Take 1 tablet (40 mg total) by mouth once daily.       Start Date: 7/3/2024  End Date: 6/12/2025          Follow up in about 4 weeks (around 7/10/2025) for DM and palpitatons ( only with).   In addition to their scheduled follow up, the patient has also been instructed to follow up on as needed basis.     Future Appointments   Date Time Provider Department Center   9/18/2025 10:00 AM Luis, All K., FNP [facility]   11/10/2025 10:30 AM Sonya Vela MD [facility]          [1]   Social History  Socioeconomic History    Marital status:    Tobacco Use    Smoking status: Never     Passive exposure: Never    Smokeless tobacco: Never    Tobacco comments:     Never   Substance and Sexual Activity    Alcohol use: Yes     Alcohol/week: 1.0 standard drink of alcohol     Types: 1 Glasses of wine per week     Comment: Social    Drug use: Never    Sexual  activity: Not Currently     Partners: Male     Birth control/protection: Abstinence     Comment: Abstinence     Social Drivers of Health     Financial Resource Strain: High Risk (2/17/2024)    Overall Financial Resource Strain (CARDIA)     Difficulty of Paying Living Expenses: Hard   Food Insecurity: Food Insecurity Present (2/17/2024)    Hunger Vital Sign     Worried About Running Out of Food in the Last Year: Sometimes true     Ran Out of Food in the Last Year: Sometimes true   Transportation Needs: No Transportation Needs (2/17/2024)    PRAPARE - Transportation     Lack of Transportation (Medical): No     Lack of Transportation (Non-Medical): No   Physical Activity: Insufficiently Active (2/17/2024)    Exercise Vital Sign     Days of Exercise per Week: 2 days     Minutes of Exercise per Session: 30 min   Stress: Stress Concern Present (2/17/2024)    [employer] - Occupational Stress Questionnaire     Feeling of Stress : Rather much   Housing Stability: Low Risk  (2/17/2024)    Housing Stability Vital Sign     Unable to Pay for Housing in the Last Year: No     Number of Places Lived in the Last Year: 1     Unstable Housing in the Last Year: No

## 2025-06-12 NOTE — TELEPHONE ENCOUNTER
Called pt back and voiced she needs to send the paperwork to me in the portal. Voiced she's doing it now.

## 2025-06-12 NOTE — PROGRESS NOTES
Rosetta Loving is a 69 y.o. female here for a diabetic eye screening with non-dilated fundus photos per Dr. Vela.    Patient cooperative?: Yes  Small pupils?: No  Last eye exam: n/a    For exam results, see Encounter Report.

## 2025-06-12 NOTE — TELEPHONE ENCOUNTER
Copied from CRM #[id number]. Topic: General Inquiry - Patient Advice  >> Jun 12, 2025  8:18 AM Anabel wrote:  Who Called: Rosetta Loving    Caller is requesting assistance/information from provider's office.    Symptoms (please be specific):     How long has patient had these symptoms:     List of preferred pharmacies on file (remove unneeded): [unfilled]  If different, enter pharmacy into here including location and phone number:        Preferred Method of Contact: Phone Call  Patient's Preferred Phone Number on File: [phone]   Best Call Back Number, if different:  Additional Information:  pt would like to speak with nurse about paperwork

## 2025-06-13 ENCOUNTER — RESULTS FOLLOW-UP (OUTPATIENT)
Dept: FAMILY MEDICINE | Facility: CLINIC | Age: 70
End: 2025-06-13

## 2025-06-13 ENCOUNTER — PATIENT MESSAGE (OUTPATIENT)
Dept: FAMILY MEDICINE | Facility: CLINIC | Age: 70
End: 2025-06-13
Payer: MEDICARE

## 2025-06-13 ENCOUNTER — TELEPHONE (OUTPATIENT)
Dept: FAMILY MEDICINE | Facility: CLINIC | Age: 70
End: 2025-06-13
Payer: MEDICARE

## 2025-06-13 DIAGNOSIS — R92.2 INCONCLUSIVE MAMMOGRAM: ICD-10-CM

## 2025-06-13 DIAGNOSIS — N30.00 ACUTE CYSTITIS WITHOUT HEMATURIA: Primary | ICD-10-CM

## 2025-06-13 DIAGNOSIS — R92.1 BREAST CALCIFICATIONS ON MAMMOGRAM: ICD-10-CM

## 2025-06-13 LAB — HCV AB SERPL QL IA: NONREACTIVE

## 2025-06-13 NOTE — TELEPHONE ENCOUNTER
Copied from CRM #[id number]. Topic: General Inquiry - Return Call  >> Jun 13, 2025 10:54 AM Sheryl wrote:  Who Called: Rosetta Loving    Patient is returning phone call    Who Left Message for Patient:  Does the patient know what this is regarding?:      Preferred Method of Contact: Phone Call  Patient's Preferred Phone Number on File: [phone]   Best Call Back Number, if different:  Additional Information: Pt called to speak with the nurse about upcoming lab orders

## 2025-06-17 ENCOUNTER — PATIENT MESSAGE (OUTPATIENT)
Dept: FAMILY MEDICINE | Facility: CLINIC | Age: 70
End: 2025-06-17
Payer: MEDICARE

## 2025-06-17 DIAGNOSIS — K21.9 GASTROESOPHAGEAL REFLUX DISEASE WITHOUT ESOPHAGITIS: ICD-10-CM

## 2025-06-17 RX ORDER — PANTOPRAZOLE SODIUM 40 MG/1
40 TABLET, DELAYED RELEASE ORAL DAILY
Qty: 90 TABLET | Refills: 0 | Status: SHIPPED | OUTPATIENT
Start: 2025-06-17 | End: 2025-06-18 | Stop reason: SDUPTHER

## 2025-06-18 ENCOUNTER — PATIENT MESSAGE (OUTPATIENT)
Dept: FAMILY MEDICINE | Facility: CLINIC | Age: 70
End: 2025-06-18
Payer: MEDICARE

## 2025-06-18 DIAGNOSIS — K21.9 GASTROESOPHAGEAL REFLUX DISEASE WITHOUT ESOPHAGITIS: ICD-10-CM

## 2025-06-18 RX ORDER — PANTOPRAZOLE SODIUM 40 MG/1
40 TABLET, DELAYED RELEASE ORAL DAILY
Qty: 90 TABLET | Refills: 3 | Status: SHIPPED | OUTPATIENT
Start: 2025-06-18 | End: 2026-06-13

## 2025-06-19 PROCEDURE — 87086 URINE CULTURE/COLONY COUNT: CPT | Performed by: STUDENT IN AN ORGANIZED HEALTH CARE EDUCATION/TRAINING PROGRAM

## 2025-06-20 ENCOUNTER — TELEPHONE (OUTPATIENT)
Dept: FAMILY MEDICINE | Facility: CLINIC | Age: 70
End: 2025-06-20
Payer: MEDICARE

## 2025-06-20 ENCOUNTER — PATIENT MESSAGE (OUTPATIENT)
Dept: FAMILY MEDICINE | Facility: CLINIC | Age: 70
End: 2025-06-20
Payer: MEDICARE

## 2025-06-20 RX ORDER — NITROFURANTOIN 25; 75 MG/1; MG/1
100 CAPSULE ORAL 2 TIMES DAILY
Qty: 14 CAPSULE | Refills: 0 | Status: SHIPPED | OUTPATIENT
Start: 2025-06-20 | End: 2025-06-27

## 2025-06-20 NOTE — TELEPHONE ENCOUNTER
Copied from CRM #[id number]. Topic: General Inquiry - Return Call  >> Jun 20, 2025 11:03 AM All wrote:  .Who Called: Rosetta Loving    Patient is returning phone call    Who Left Message for Patient:Diamond Cantu LPN  Does the patient know what this is regarding?:      Preferred Method of Contact: Phone Call  Patient's Preferred Phone Number on File: [phone]   Best Call Back Number, if different:  Additional Information:

## 2025-06-21 LAB
BACTERIA UR CULT: ABNORMAL
BACTERIA UR CULT: ABNORMAL

## 2025-06-23 ENCOUNTER — TELEPHONE (OUTPATIENT)
Dept: FAMILY MEDICINE | Facility: CLINIC | Age: 70
End: 2025-06-23
Payer: MEDICARE

## 2025-06-23 NOTE — TELEPHONE ENCOUNTER
Copied from CRM #[id number]. Topic: General Inquiry - Patient Advice  >> Jun 23, 2025  8:03 AM Carmen wrote:  Type:  Patient Requesting Referral    Who Called: pt    Does the patient already have the specialty appointment scheduled?: no    If yes, what is the date of that appointment?: n/a    Referral to What Specialty: CARDIOLOGIST    Reason for Referral: pulse high    Does the patient want the referral with a specific physician?: Dr Anna's office - any other associate in his office, he is not taking new pts.    Is the specialist an [facility]?: ??    Patient Requesting a Response?: YES    Would the patient rather a call back or a response via [facility]?  Phone call    Best Call Back Number: [phone]    Additional Information:  [facility]  · [facility] [address] [place] [address]  · Get Directions  · phone: [phone]  · fax: [phone]

## 2025-06-30 ENCOUNTER — TELEPHONE (OUTPATIENT)
Dept: RADIOLOGY | Facility: HOSPITAL | Age: 70
End: 2025-06-30
Payer: MEDICARE

## 2025-06-30 ENCOUNTER — HOSPITAL ENCOUNTER (OUTPATIENT)
Dept: RADIOLOGY | Facility: HOSPITAL | Age: 70
Discharge: HOME | End: 2025-06-30
Attending: STUDENT IN AN ORGANIZED HEALTH CARE EDUCATION/TRAINING PROGRAM
Payer: MEDICARE

## 2025-06-30 VITALS — WEIGHT: 198 LBS | HEIGHT: 65 IN | BODY MASS INDEX: 32.99 KG/M2

## 2025-06-30 DIAGNOSIS — R92.2 INCONCLUSIVE MAMMOGRAM: ICD-10-CM

## 2025-06-30 DIAGNOSIS — N30.00 ACUTE CYSTITIS WITHOUT HEMATURIA: ICD-10-CM

## 2025-06-30 PROCEDURE — 76642 ULTRASOUND BREAST LIMITED: CPT | Mod: TC,50

## 2025-06-30 PROCEDURE — 76642 ULTRASOUND BREAST LIMITED: CPT | Mod: 26,50,, | Performed by: RADIOLOGY

## 2025-06-30 PROCEDURE — 77062 BREAST TOMOSYNTHESIS BI: CPT | Mod: 26,,, | Performed by: RADIOLOGY

## 2025-06-30 PROCEDURE — 77066 DX MAMMO INCL CAD BI: CPT | Mod: 26,,, | Performed by: RADIOLOGY

## 2025-06-30 PROCEDURE — 77062 BREAST TOMOSYNTHESIS BI: CPT | Mod: TC

## 2025-07-01 PROBLEM — R92.1 BREAST CALCIFICATIONS ON MAMMOGRAM: Status: ACTIVE | Noted: 2025-07-01

## 2025-07-02 ENCOUNTER — TELEPHONE (OUTPATIENT)
Dept: FAMILY MEDICINE | Facility: CLINIC | Age: 70
End: 2025-07-02
Payer: MEDICARE

## 2025-07-02 NOTE — TELEPHONE ENCOUNTER
----- Message from Hiral Stein MD sent at 7/1/2025  8:39 PM CDT -----  Breast imaging shows microcalcifications in the right breast that require further evaluation with a right breast biopsy.  There is no evidence of malignancy in the left breast.  Orders for right   breast biopsy have been placed. [facility] staff will contact the patient to schedule.    Thank you for choosing [facility] for your medical needs. Have a great day!   -Hiral Stein MD, [facility]    ----- Message -----  From: Interface, Rad Results In  Sent: 7/1/2025   1:36 PM CDT  To: Sonya Vela MD

## 2025-07-15 ENCOUNTER — HOSPITAL ENCOUNTER (OUTPATIENT)
Dept: RADIOLOGY | Facility: HOSPITAL | Age: 70
Discharge: HOME | End: 2025-07-15
Attending: STUDENT IN AN ORGANIZED HEALTH CARE EDUCATION/TRAINING PROGRAM
Payer: MEDICARE

## 2025-07-15 DIAGNOSIS — R92.8 ABNORMAL FINDINGS ON DIAGNOSTIC IMAGING OF BREAST: Primary | ICD-10-CM

## 2025-07-15 PROCEDURE — 19081 BX BREAST 1ST LESION STRTCTC: CPT | Mod: RT

## 2025-07-15 PROCEDURE — 88305 TISSUE EXAM BY PATHOLOGIST: CPT | Performed by: STUDENT IN AN ORGANIZED HEALTH CARE EDUCATION/TRAINING PROGRAM

## 2025-07-16 LAB — PSYCHE PATHOLOGY RESULT: NORMAL
